# Patient Record
Sex: FEMALE | Race: WHITE | NOT HISPANIC OR LATINO | Employment: FULL TIME | URBAN - NONMETROPOLITAN AREA
[De-identification: names, ages, dates, MRNs, and addresses within clinical notes are randomized per-mention and may not be internally consistent; named-entity substitution may affect disease eponyms.]

---

## 2017-01-25 ENCOUNTER — HISTORY (OUTPATIENT)
Dept: FAMILY MEDICINE | Facility: OTHER | Age: 47
End: 2017-01-25

## 2017-01-25 ENCOUNTER — OFFICE VISIT - GICH (OUTPATIENT)
Dept: FAMILY MEDICINE | Facility: OTHER | Age: 47
End: 2017-01-25

## 2017-01-25 DIAGNOSIS — L28.0 LICHEN SIMPLEX CHRONICUS: ICD-10-CM

## 2017-01-25 DIAGNOSIS — N91.2 AMENORRHEA: ICD-10-CM

## 2017-01-25 DIAGNOSIS — E04.1 NONTOXIC SINGLE THYROID NODULE: ICD-10-CM

## 2017-01-25 DIAGNOSIS — Z13.220 ENCOUNTER FOR SCREENING FOR LIPOID DISORDERS: ICD-10-CM

## 2017-01-25 DIAGNOSIS — F33.0 MAJOR DEPRESSIVE DISORDER, RECURRENT, MILD (H): ICD-10-CM

## 2017-01-25 DIAGNOSIS — Z23 ENCOUNTER FOR IMMUNIZATION: ICD-10-CM

## 2017-01-25 DIAGNOSIS — Z13.1 ENCOUNTER FOR SCREENING FOR DIABETES MELLITUS: ICD-10-CM

## 2017-01-25 DIAGNOSIS — Z00.00 ENCOUNTER FOR GENERAL ADULT MEDICAL EXAMINATION WITHOUT ABNORMAL FINDINGS: ICD-10-CM

## 2017-01-25 LAB
ANION GAP - HISTORICAL: 6 (ref 5–18)
BUN SERPL-MCNC: 14 MG/DL (ref 7–25)
BUN/CREAT RATIO - HISTORICAL: 16
CALCIUM SERPL-MCNC: 9.7 MG/DL (ref 8.6–10.3)
CHLORIDE SERPLBLD-SCNC: 103 MMOL/L (ref 98–107)
CHOL/HDL RATIO - HISTORICAL: 2.95
CHOLESTEROL TOTAL: 215 MG/DL
CO2 SERPL-SCNC: 28 MMOL/L (ref 21–31)
CREAT SERPL-MCNC: 0.89 MG/DL (ref 0.7–1.3)
ESTRADIOL SERPL-MCNC: 26 PG/ML
GFR IF NOT AFRICAN AMERICAN - HISTORICAL: >60 ML/MIN/1.73M2
GLUCOSE SERPL-MCNC: 80 MG/DL (ref 70–105)
HDLC SERPL-MCNC: 73 MG/DL (ref 23–92)
LDLC SERPL CALC-MCNC: 133 MG/DL
NON-HDL CHOLESTEROL - HISTORICAL: 142 MG/DL
PATIENT STATUS - HISTORICAL: ABNORMAL
POTASSIUM SERPL-SCNC: 4 MMOL/L (ref 3.5–5.1)
SODIUM SERPL-SCNC: 137 MMOL/L (ref 133–143)
TRIGL SERPL-MCNC: 44 MG/DL
TSH - HISTORICAL: 1.06 UIU/ML (ref 0.34–5.6)

## 2017-01-25 ASSESSMENT — PATIENT HEALTH QUESTIONNAIRE - PHQ9: SUM OF ALL RESPONSES TO PHQ QUESTIONS 1-9: 5

## 2017-01-25 ASSESSMENT — ANXIETY QUESTIONNAIRES
3. WORRYING TOO MUCH ABOUT DIFFERENT THINGS: SEVERAL DAYS
6. BECOMING EASILY ANNOYED OR IRRITABLE: NOT AT ALL
5. BEING SO RESTLESS THAT IT IS HARD TO SIT STILL: NOT AT ALL
7. FEELING AFRAID AS IF SOMETHING AWFUL MIGHT HAPPEN: NOT AT ALL
GAD7 TOTAL SCORE: 2
1. FEELING NERVOUS, ANXIOUS, OR ON EDGE: NOT AT ALL
4. TROUBLE RELAXING: NOT AT ALL
2. NOT BEING ABLE TO STOP OR CONTROL WORRYING: SEVERAL DAYS

## 2017-01-26 ENCOUNTER — HISTORY (OUTPATIENT)
Dept: FAMILY MEDICINE | Facility: OTHER | Age: 47
End: 2017-01-26

## 2017-02-03 ENCOUNTER — COMMUNICATION - GICH (OUTPATIENT)
Dept: FAMILY MEDICINE | Facility: OTHER | Age: 47
End: 2017-02-03

## 2017-03-22 ENCOUNTER — HISTORY (OUTPATIENT)
Dept: FAMILY MEDICINE | Facility: OTHER | Age: 47
End: 2017-03-22

## 2017-03-22 ENCOUNTER — OFFICE VISIT - GICH (OUTPATIENT)
Dept: FAMILY MEDICINE | Facility: OTHER | Age: 47
End: 2017-03-22

## 2017-03-22 DIAGNOSIS — R68.89 OTHER GENERAL SYMPTOMS AND SIGNS: ICD-10-CM

## 2017-03-22 DIAGNOSIS — R69 ILLNESS: ICD-10-CM

## 2017-12-13 ENCOUNTER — HISTORY (OUTPATIENT)
Dept: RADIOLOGY | Facility: OTHER | Age: 47
End: 2017-12-13

## 2017-12-13 ENCOUNTER — HOSPITAL ENCOUNTER (OUTPATIENT)
Dept: RADIOLOGY | Facility: OTHER | Age: 47
End: 2017-12-13
Attending: FAMILY MEDICINE

## 2017-12-13 DIAGNOSIS — Z12.31 ENCOUNTER FOR SCREENING MAMMOGRAM FOR MALIGNANT NEOPLASM OF BREAST: ICD-10-CM

## 2017-12-21 ENCOUNTER — COMMUNICATION - GICH (OUTPATIENT)
Dept: FAMILY MEDICINE | Facility: OTHER | Age: 47
End: 2017-12-21

## 2018-01-03 NOTE — PROGRESS NOTES
Patient Information     Patient Name MRN Sex     Johanne Fish [id number] Female 1970      Progress Notes by Zuly Dyer DO at 2017  2:45 PM     Author:  Zuly Dyer DO Service:  (none) Author Type:  PHYS- Osteopathic     Filed:  2017  7:35 AM Encounter Date:  2017 Status:  Signed     :  Zuly Dyer DO (PHYS- Osteopathic)            ANNUAL PHYSICAL - FEMALE    HPI: Johanne Fish is a 46 y.o. female who presents for a yearly exam.  Concerns include:   1. Refill on her clobetasol.  Using for lichen sclerosis/atrophy.  Helpful, but rarely uses.  2x/year when she feels increase in itching/dryness.  Had endometrial ablation in , and had some spotting/periods afterward, nothing since 2014.  Hot flashes have resolved.  2. Refill on her prozac.  Mostly using during the winter months.  Will end up going off every summer and feeling well.  3. Thyroid cyst.  Had imaged a couple years ago.  No changes from previous.  But was recommended to see in one more year.    Patient's last menstrual period was 2014.   Contraception: postmenopausal, she assumes.  Risk for STI?: No  Last pap: 2014 - NEG with Neg HPV.  Prior abnormal with LOOP, but at least three normals since procedure.  Due2019.  Any hx of abnormal paps:  As above  FH of early CA?: sister with ovarian CA at age 50.  Cholesterol/DM concerns/screening: due today  Tobacco?: former smoker  Calcium intake: No  DEXA: NA  Last mammo: 2016  Colonoscopy: Due at age 48  Immunizations: Tdap due, had flu vaccine.  Shingles due at age 48.    Patient Active Problem List       Diagnosis  Date Noted     Thyroid cyst  2014     Anxiety, generalized  2012     Major depressive disorder, recurrent episode, mild (HC)       LICHEN SCLEROSIS ET ATROPHICUS       CERVICAL DYSPLASIA       DVT  2010     While on OCP's          PREMATURE VENTRICULAR CONTRACTIONS, FREQUENT       short CO  interval, cardiac workup negative.  Observation recommended by Dr Fletcher.            Past Medical History      Diagnosis   Date     Cervical dysplasia  2001     abnormal Paps, treated by loop excision.       DVT  1/23/2010     Lichen sclerosus et atrophicus  2005     external genital biopsy      Low back pain       Intermittent      PREMATURE VENTRICULAR CONTRACTIONS, FREQUENT         Past Surgical History       Procedure   Laterality Date     Tonsil and adenoidectomy        Breast biopsy  Right 1/1997     benign disease.        Leep procedure   9/2001     Loop       Stereotactic r breast bx   2012     Benign  Stereotactic R breast Bx [Other][       Endometrial ablation   07/20/2010       Social History     Social History        Marital status:  Single     Spouse name: N/A     Number of children:  N/A     Years of education:  N/A     Occupational History      Not on file.     Social History Main Topics          Smoking status:   Former Smoker      Packs/day:  1.00      Years:  10.00      Types:  Cigarettes      Quit date:  1/1/2006      Smokeless tobacco:   Never Used      Alcohol use   1.5 oz/week     3 Cans of beer per week        Comment: occasionally, weekends       Drug use:   No      Sexual activity:   Yes      Partners:  Male      Other Topics  Concern     Not on file      Social History Narrative     Patient worked at [employer] as ; now working in the school district.  .  Lives in her own house with her younger child. Older child grown.        Tahir  Child - 09/14/91    Sean  Child - 05/05/94       Family History       Problem   Relation Age of Onset     Heart Disease  Father      Congestive heart failure       Good Health  Mother      Other  Other      12 half-siblings, no history of chronic illnesses.       Cancer-ovarian  Sister 50     ovarian cancer       Other  Sister       hysterectomy for bleeding.       Cancer-breast  Maternal Grandmother        Current Outpatient  "Prescriptions       Medication  Sig Dispense Refill     clobetasol cream 0.05% (TEMOVATE) 0.05 % cream Apply to affected area twice daily for 2 weeks only 15 g 2     FLUoxetine (PROZAC) 20 mg capsule Take 1 capsule by mouth every morning. 90 capsule 4     No current facility-administered medications for this visit.      Medications have been reviewed by me and are current to the best of my knowledge and ability.       REVIEW OF SYSTEMS:  Refer to HPI; all other systems reviewed and negative.    PHYSICAL EXAM:  Visit Vitals       /88     Pulse 72     Ht 1.651 m (5' 5\")     Wt 70 kg (154 lb 6.4 oz)     LMP 2014     BMI 25.69 kg/m2     CONSTITUTIONAL:  Alert, cooperative, NAD.  EYES: No scleral icterus.  PERRLA.  Conjunctiva clear.  ENT/MOUTH: External ears and nose normal.  TMs normal.  Moist mucous membranes. Oropharynx clear.    ENDO: No thyromegaly or thyroid nodules.  LYMPH:  No cervical or supraclavicular LA.    BREASTS: declined  CARDIOVASCULAR: Regular, S1, S2.  No S3 or S4.  No murmur/gallop/rub.  No peripheral edema.  RESPIRATORY: CTA bilaterally, no wheezes, rhonchi or rales.  GI: Bowel sounds wnl.  Soft, nontender, nondistended.  No masses or HSM.  No rebound or guarding.  : declined today as pap smear was not due.  MSKEL: Grossly normal ROM.  No clubbing.  INTEGUMENTARY:  Warm, dry.  No rash noted on exposed skin.  NEUROLOGIC: Facies symmetric.  Grossly normal movement and tone.  No tremor.  PSYCHIATRIC: Affect normal.  Speech fluent.      PHQ Depression Screen  Over the last 2 weeks, how often have you been bothered by any of the following problems?  1. Little interest or pleasure in doing things: 1 - Several days  2. Feeling down, depressed, or hopeless: 1 - Several days  3. Trouble falling or staying asleep, or sleeping too much: 2 - More than half the days  4. Feeling tired or having little energy: 1 - Several days  5. Poor appetite or overeatin - Not at all  6. Feeling bad about " yourself - or that you are a failure or have let yourself or your family down: 0 - Not at all  7. Trouble concentrating on things, such as reading the newspaper or watching television: 0 - Not at all  8. Moving or speaking so slowly that other people could have noticed. Or the opposite - being so fidgety or restless that you have been moving around a lot more than usual: 0 - Not at all  9. Thoughts that you would be better off dead, or of hurting yourself in some way: 0 - Not at all  PHQ-9 TOTAL SCORE: 5  Depression Severity Level: mild       Results for orders placed or performed in visit on 01/25/17      LIPID PANEL      Result  Value Ref Range    CHOLESTEROL,TOTAL 215 (H) <200 mg/dL    TRIGLYCERIDES 44 <150 mg/dL    HDL CHOLESTEROL 73 23 - 92 mg/dL    NON-HDL CHOLESTEROL 142 <145 mg/dl    CHOL/HDL RATIO            2.95 <4.50                    LDL CHOLESTEROL 133 (H) <100 mg/dL    PATIENT STATUS            FASTING                   TSH      Result  Value Ref Range    TSH 1.06 0.34 - 5.60 uIU/mL   BASIC METABOLIC PANEL      Result  Value Ref Range    SODIUM 137 133 - 143 mmol/L    POTASSIUM 4.0 3.5 - 5.1 mmol/L    CHLORIDE 103 98 - 107 mmol/L    CO2,TOTAL 28 21 - 31 mmol/L    ANION GAP 6 5 - 18                    GLUCOSE 80 70 - 105 mg/dL    CALCIUM 9.7 8.6 - 10.3 mg/dL    BUN 14 7 - 25 mg/dL    CREATININE 0.89 0.70 - 1.30 mg/dL    BUN/CREAT RATIO           16                    GFR if African American >60 >60 ml/min/1.73m2    GFR if not African American >60 >60 ml/min/1.73m2   ESTRADIOL      Result  Value Ref Range    ESTRADIOL GIH 26 pg/mL     ASSESSMENT/PLAN:    ICD-10-CM    1. Annual physical exam Z00.00    2. Major depressive disorder, recurrent episode, mild (HC) F33.0 FLUoxetine (PROZAC) 20 mg capsule   3. LICHEN SCLEROSIS ET ATROPHICUS L28.0 clobetasol cream 0.05% (TEMOVATE) 0.05 % cream   4. Need for Tdap vaccination Z23 TDAP VACCINE IM      CT ADMIN VACC INITIAL   5. Lipid screening Z13.220 LIPID PANEL       LIPID PANEL   6. Thyroid cyst E04.1 TSH      TSH      US THYROID/PARATHYROID   7. Diabetes mellitus screening Z13.1 BASIC METABOLIC PANEL      BASIC METABOLIC PANEL   8. Amenorrhea N91.2 FSH      LUTEINIZING HORMONE      ESTRADIOL      ESTRADIOL     Relevant cancer screening discussed.    Counseled on healthy diet, Calcium and vitamin D intake, and exercise.  Tdap given today.  Screening labs to include: BMP, lipid panel.    MDD, recurrent: may be more SAD.  Refilled Prozac at prior 20mg daily.  Encouraged at least 6+ weeks of use per season to have benefit.  Lichen sclerosis, chronic, mild: refilled clobetasol cream today due to  medication.    Amenorrhea with likely postmenopausal state: will obtain FSH, LH and estradiol levels.    Thyroid cyst, likely benign: follow up US to be scheduled.      JUAN ROBERSON, DO

## 2018-01-04 NOTE — PROGRESS NOTES
Patient Information     Patient Name MRN Sex Johanne Gonzales [id number] Female 1970      Progress Notes by Sara Buitrago NP at 3/22/2017  4:45 PM     Author:  Sara Buitrago NP Service:  (none) Author Type:  PHYS- Nurse Practitioner     Filed:  3/22/2017  5:36 PM Encounter Date:  3/22/2017 Status:  Signed     :  Sara Buitrago NP (PHYS- Nurse Practitioner)            HPI:    Johanne Fish is a 46 y.o. female who presents to clinic today for fever, flu.  Symptoms started less than 48 hours ago.  Fevers around 100-101, chills, headaches, body aches, sore throat, cough, runny nose, fatigue.  Dry cough.  No chest tightness or shortness of breath.  Normal appetite.  Pushing fluids.  Taking Ibuprofen and sinus medication.  Had flu shot.            Past Medical History      Diagnosis   Date     Cervical dysplasia       abnormal Paps, treated by loop excision.       DVT  2010     Lichen sclerosus et atrophicus       external genital biopsy      Low back pain       Intermittent      PREMATURE VENTRICULAR CONTRACTIONS, FREQUENT       Past Surgical History       Procedure   Laterality Date     Tonsil and adenoidectomy        Breast biopsy  Right 1997     benign disease.        Leep procedure   2001     Loop       Stereotactic r breast bx        Benign  Stereotactic R breast Bx [Other][       Endometrial ablation   2010     Social History        Substance Use Topics          Smoking status:   Former Smoker      Packs/day:  1.00      Years:  10.00      Types:  Cigarettes      Quit date:  2006      Smokeless tobacco:   Never Used      Alcohol use   1.5 oz/week     3 Cans of beer per week        Comment: occasionally, weekends       Current Outpatient Prescriptions       Medication  Sig Dispense Refill     clobetasol cream 0.05% (TEMOVATE) 0.05 % cream Apply to affected area twice daily for 2 weeks only 15 g 2     FLUoxetine (PROZAC) 20 mg capsule Take 1  "capsule by mouth every morning. 90 capsule 4     No current facility-administered medications for this visit.      Medications have been reviewed by me and are current to the best of my knowledge and ability.    No Known Allergies    ROS:  Refer to HPI    Visit Vitals       /70     Pulse 88     Temp 100  F (37.8  C) (Tympanic)     Ht 1.638 m (5' 4.5\")     Wt 71.8 kg (158 lb 6 oz)     LMP 11/20/2014     BMI 26.77 kg/m2       EXAM:  General Appearance: Well appearing adult female, appropriate appearance for age. No acute distress  Head: normocephalic, atraumatic  Ears: Left TM with bony landmarks appreciated, no erythema, no effusion, no bulging, no purulence.  Right TM with bony landmarks appreciated, no erythema, no effusion, no bulging, no purulence.   Left auditory canal clear.  Right auditory canal clear.  Normal external ears, non tender.  Eyes: conjunctivae normal, no drainage  Orophayrnx: moist mucous membranes, posterior pharynx without erythema, tonsils without hypertrophy, no erythema, no exudates or petechiae, no post nasal drip seen.    Neck: supple without adenopathy  Respiratory: normal chest wall and respirations.  Normal effort.  Clear to auscultation bilaterally, no wheezes or rhonchi or congestion, no cough appreciated  Cardiac: RRR with no murmurs  Psychological: normal affect, alert and pleasant        ASSESSMENT/PLAN:    ICD-10-CM    1. Influenza-like symptoms R68.89 oseltamivir (TAMIFLU) 75 mg capsule   2. Influenza-like illness R69 oseltamivir (TAMIFLU) 75 mg capsule         Symptoms consistent with influenza  Tamiflu 75 mg BID x 5 days   Encouraged fluids  Symptomatic treatment - salt water gargles, honey, elevation, humidifier, sinus rinse/netti pot, lozenges, etc   Tylenol or ibuprofen PRN  Follow up if symptoms persist or worsen or concerns        Patient Instructions   Tamiflu twice daily x 5 days    Symptoms likely due to virus. No antibiotic is needed at this time.       Most " coughs are caused by a viral infection.   Usually coughs can last 2 to 3 weeks. Sometimes the cough becomes loose (wet) for a few days, and your child coughs up a lot of phlegm (mucus). This is usually a sign that the end of the illness is near.    Most sore throats are caused by viruses and are part of a cold. About 10% of sore throats are caused by strep bacteria.    Encouraged fluids and rest.    May use symptomatic care with tylenol or ibuprofen.     Using a humidifier works well to break up the congestion.     Elevate the mattress to 15 degrees in order to help with the congestion.    Frequent swallows of cool liquid.      Oatmeal or honey coats the throat and some patients find it soothes the pain.     Salt water gargles as needed    Return to clinic with change/worsening of symptoms or concerns.       Index French All languages Related topics   Flu (Influenza)   ________________________________________________________________________  KEY POINTS    Flu is caused by a virus, and can be spread by coughing or sneezing, or by touching something with the virus on it.    The flu vaccine is the best way to help prevent the flu. Washing your hands often is also important. Flu can be treated rest, drinking plenty of liquids, and sometimes with medicine.    If you have another medical problem and get the flu, it s best to see your healthcare provider.  ________________________________________________________________________  What is flu?   Influenza, also called the flu, is an infection caused by a virus. The flu affects your whole body, especially your air passages, and causes symptoms that are similar to cold symptoms. Flu symptoms tend to be worse than cold symptoms, but it can sometimes be hard to tell the difference between the flu and a cold unless you get a test.  Infection with the flu virus sometimes leads to other infections, such as ear, sinus, and chest infections. Pneumonia can also occur as a result of  the flu. It can be caused by the flu virus itself or by bacteria infecting lung tissues that have been damaged by the virus. Older adults; people whose immune systems are weak; and people with chronic medical problems, such as heart or lung disease or diabetes, are at risk for more severe symptoms or problems. This is why it s important to try to prevent flu by getting flu shots every year.  What is the cause?  Flu is caused by a virus. When you have the flu, the virus is in your mucus and saliva and can spread to others when you cough or sneeze. People can also get the flu if they touch something with the flu virus on it (like cups, doorknobs, and hands) and then touch their mouth, nose, or eyes.  Outbreaks of flu occur every year, usually in late fall and winter.  What are the symptoms?  Flu tends to start suddenly. You may feel fine one hour and feel sick the next. Flu symptoms may be different from person to person. Some of the common symptoms include:    Chills, sweating, and fever    Cough    Runny or stuffy nose    Headache    Muscle or body aches    Sore throat    Tiredness  Flu symptoms usually last 3 to 7 days. You may start feeling better after the first 2 days or so.  How is it diagnosed?  Your healthcare provider will ask about your symptoms and may examine you. The diagnosis is usually based on your symptoms. There are lab tests for flu, but in most cases there is no need to do a test, especially when many others in your community are sick with the flu.  How is it treated?  Usually you can treat your symptoms at home.    Get plenty of rest.    Drink a lot of clear liquids. Water, broth, juice, electrolyte solutions, and noncaffeinated drinks are best. When you have a high fever, your body needs more liquid because you lose more water in your breath and from your skin. Having enough fluids also helps the mucus in your sinuses and lungs stay thin and easy to clear from the body. When the mucus is thin,  it is less likely to cause a sinus or chest infection.    Consider taking acetaminophen or ibuprofen to relieve headaches and muscle aches and to lower a fever. Read the label and take as directed. Unless recommended by your healthcare provider, you should not take these medicines for more than 10 days.    Nonsteroidal anti-inflammatory medicines (NSAIDs), such as ibuprofen, naproxen, and aspirin, may cause stomach bleeding and other problems. These risks increase with age.    Acetaminophen may cause liver damage or other problems. Unless recommended by your provider, don't take more than 3000 milligrams (mg) in 24 hours. To make sure you don t take too much, check other medicines you take to see if they also contain acetaminophen. Ask your provider if you need to avoid drinking alcohol while taking this medicine.    If your nose or sinuses get congested, a decongestant medicine may help you feel better. Taking a decongestant may help prevent ear or sinus infections.    Cough medicine or cough drops may temporarily help control a cough.  Antiviral medicine is medicine your healthcare provider can prescribe that may make flu symptoms less severe. It may also help you feel better a little sooner. The medicine can be taken as a tablet or nasal spray. It helps only if you start taking it within the first 2 days of illness. Usually it is taken for only a few days. Even if you are taking antiviral medicine, you can pass the flu virus to other people. It is still important to wash your hands often and cover your mouth and nose when you cough or sneeze.  Your healthcare provider may prescribe antiviral medicine if you aren t sick yet but have been exposed to the flu and have not had the flu vaccine.  Talk to your healthcare provider if you have symptoms of the flu and:    You have heart disease, asthma, chronic bronchitis, kidney disease, diabetes, or another chronic medical problem.    Your immune system does not work  normally (for example, because you are taking steroid medicine for a medical problem).    Your symptoms get more severe, you have a painful cough, you are coughing up mucus, or you are having trouble breathing. These symptoms can be signs of pneumonia.  Ask your healthcare provider:    How and when you will get your test results    How long it will take to recover from this illness    If there are activities you should avoid, and when you can return to your normal activities    How to take care of yourself at home    What symptoms or problems you should watch for and what to do if you have them  Make sure you know when you should come back for a checkup. Keep all appointments for provider visits or tests.  How can I help prevent flu?  The flu vaccine is the best way to help prevent the flu. If you do get the flu, the vaccine may help keep you from getting really sick. The flu vaccine is recommended for adults and children 6 months and older. It s especially important for those with a chronic illness.  The flu vaccine can be given as a nasal spray or as a shot in the arm. The nasal spray is not recommended for the 8917-7686 flu season because it has not prevented the disease for the last 3 years.    The shot contains killed virus and is safe for everyone age 6 months and older.  You should get a new flu shot every year because the vaccine wears off over time and because it is changed each year to protect against the current year s most likely flu strains. It s best to get the new vaccine as soon as it s available each year, before the start of flu season. However, if the vaccine is still available, it can be helpful to get it anytime during the flu season. Flu season usually starts in October and can last through May.  Flu seasons can vary from region to region. If you are at high risk for infection and plan to travel to an area where you might be exposed to the flu, make sure you have an up-to-date flu shot before  you go on your trip.  Other things you can do to help avoid getting the flu are:    Wash your hands often with soap and water. Wash for 20 seconds (long enough to sing the whole  Happy Birthday  song) or use an alcohol-based hand .    Avoid touching your eyes, nose, or mouth when you are out in public.    Stay at least 6 feet away from people who are sick, if you can.    Try to take good care of yourself: Get plenty of sleep, be physically active, manage your stress, drink plenty of fluids, and eat healthy food. Stop smoking.    Keep surfaces clean--especially bedside tables, surfaces in the bathroom, and toys for children. Some viruses and bacteria can live 2 hours or more on surfaces like cafeteria tables, doorknobs, and desks. Wipe them down with a household disinfectant according to directions on the label.  If you are sick, you can help protect others if you:    Don t go to work or school. Avoid contact with other people except to get medical care.    Cover your nose and mouth with a tissue when you cough or sneeze. Throw the tissue in the trash after you use it, and then wash your hands. If you don t have a tissue, cough or sneeze into your upper sleeve instead of your hands.    Clean your hands often with soap and water or an alcohol-based hand , especially after using tissues or coughing or sneezing into your hands.  Developed by [employer].  AdultAdvisor 2016.3 published by [employer].  Last modified: 2016-06-29  Last reviewed: 2014-10-30  This content is reviewed periodically and is subject to change as new health information becomes available. The information is intended to inform and educate and is not a replacement for medical evaluation, advice, diagnosis or treatment by a healthcare professional.  References   Adult Advisor 2016.3 Index    Copyright   2016 [employer], a division of [employer] All rights reserved.

## 2018-01-04 NOTE — PATIENT INSTRUCTIONS
Patient Information     Patient Name MRN Sex Johanne Gonzales [id number] Female 1970      Patient Instructions by Sara Buitrago NP at 3/22/2017  4:56 PM     Author:  Sara Buitrago NP  Service:  (none) Author Type:  PHYS- Nurse Practitioner     Filed:  3/22/2017  4:56 PM  Encounter Date:  3/22/2017 Status:  Addendum     :  Sara Buitrago NP (PHYS- Nurse Practitioner)        Related Notes: Original Note by Sara Buitrago NP (PHYS- Nurse Practitioner) filed at 3/22/2017  4:56 PM            Tamiflu twice daily x 5 days    Symptoms likely due to virus. No antibiotic is needed at this time.       Most coughs are caused by a viral infection.   Usually coughs can last 2 to 3 weeks. Sometimes the cough becomes loose (wet) for a few days, and your child coughs up a lot of phlegm (mucus). This is usually a sign that the end of the illness is near.    Most sore throats are caused by viruses and are part of a cold. About 10% of sore throats are caused by strep bacteria.    Encouraged fluids and rest.    May use symptomatic care with tylenol or ibuprofen.     Using a humidifier works well to break up the congestion.     Elevate the mattress to 15 degrees in order to help with the congestion.    Frequent swallows of cool liquid.      Oatmeal or honey coats the throat and some patients find it soothes the pain.     Salt water gargles as needed    Return to clinic with change/worsening of symptoms or concerns.       Index Guamanian All languages Related topics   Flu (Influenza)   ________________________________________________________________________  KEY POINTS    Flu is caused by a virus, and can be spread by coughing or sneezing, or by touching something with the virus on it.    The flu vaccine is the best way to help prevent the flu. Washing your hands often is also important. Flu can be treated rest, drinking plenty of liquids, and sometimes with medicine.    If you have another medical  problem and get the flu, it s best to see your healthcare provider.  ________________________________________________________________________  What is flu?   Influenza, also called the flu, is an infection caused by a virus. The flu affects your whole body, especially your air passages, and causes symptoms that are similar to cold symptoms. Flu symptoms tend to be worse than cold symptoms, but it can sometimes be hard to tell the difference between the flu and a cold unless you get a test.  Infection with the flu virus sometimes leads to other infections, such as ear, sinus, and chest infections. Pneumonia can also occur as a result of the flu. It can be caused by the flu virus itself or by bacteria infecting lung tissues that have been damaged by the virus. Older adults; people whose immune systems are weak; and people with chronic medical problems, such as heart or lung disease or diabetes, are at risk for more severe symptoms or problems. This is why it s important to try to prevent flu by getting flu shots every year.  What is the cause?  Flu is caused by a virus. When you have the flu, the virus is in your mucus and saliva and can spread to others when you cough or sneeze. People can also get the flu if they touch something with the flu virus on it (like cups, doorknobs, and hands) and then touch their mouth, nose, or eyes.  Outbreaks of flu occur every year, usually in late fall and winter.  What are the symptoms?  Flu tends to start suddenly. You may feel fine one hour and feel sick the next. Flu symptoms may be different from person to person. Some of the common symptoms include:    Chills, sweating, and fever    Cough    Runny or stuffy nose    Headache    Muscle or body aches    Sore throat    Tiredness  Flu symptoms usually last 3 to 7 days. You may start feeling better after the first 2 days or so.  How is it diagnosed?  Your healthcare provider will ask about your symptoms and may examine you. The  diagnosis is usually based on your symptoms. There are lab tests for flu, but in most cases there is no need to do a test, especially when many others in your community are sick with the flu.  How is it treated?  Usually you can treat your symptoms at home.    Get plenty of rest.    Drink a lot of clear liquids. Water, broth, juice, electrolyte solutions, and noncaffeinated drinks are best. When you have a high fever, your body needs more liquid because you lose more water in your breath and from your skin. Having enough fluids also helps the mucus in your sinuses and lungs stay thin and easy to clear from the body. When the mucus is thin, it is less likely to cause a sinus or chest infection.    Consider taking acetaminophen or ibuprofen to relieve headaches and muscle aches and to lower a fever. Read the label and take as directed. Unless recommended by your healthcare provider, you should not take these medicines for more than 10 days.    Nonsteroidal anti-inflammatory medicines (NSAIDs), such as ibuprofen, naproxen, and aspirin, may cause stomach bleeding and other problems. These risks increase with age.    Acetaminophen may cause liver damage or other problems. Unless recommended by your provider, don't take more than 3000 milligrams (mg) in 24 hours. To make sure you don t take too much, check other medicines you take to see if they also contain acetaminophen. Ask your provider if you need to avoid drinking alcohol while taking this medicine.    If your nose or sinuses get congested, a decongestant medicine may help you feel better. Taking a decongestant may help prevent ear or sinus infections.    Cough medicine or cough drops may temporarily help control a cough.  Antiviral medicine is medicine your healthcare provider can prescribe that may make flu symptoms less severe. It may also help you feel better a little sooner. The medicine can be taken as a tablet or nasal spray. It helps only if you start taking  it within the first 2 days of illness. Usually it is taken for only a few days. Even if you are taking antiviral medicine, you can pass the flu virus to other people. It is still important to wash your hands often and cover your mouth and nose when you cough or sneeze.  Your healthcare provider may prescribe antiviral medicine if you aren t sick yet but have been exposed to the flu and have not had the flu vaccine.  Talk to your healthcare provider if you have symptoms of the flu and:    You have heart disease, asthma, chronic bronchitis, kidney disease, diabetes, or another chronic medical problem.    Your immune system does not work normally (for example, because you are taking steroid medicine for a medical problem).    Your symptoms get more severe, you have a painful cough, you are coughing up mucus, or you are having trouble breathing. These symptoms can be signs of pneumonia.  Ask your healthcare provider:    How and when you will get your test results    How long it will take to recover from this illness    If there are activities you should avoid, and when you can return to your normal activities    How to take care of yourself at home    What symptoms or problems you should watch for and what to do if you have them  Make sure you know when you should come back for a checkup. Keep all appointments for provider visits or tests.  How can I help prevent flu?  The flu vaccine is the best way to help prevent the flu. If you do get the flu, the vaccine may help keep you from getting really sick. The flu vaccine is recommended for adults and children 6 months and older. It s especially important for those with a chronic illness.  The flu vaccine can be given as a nasal spray or as a shot in the arm. The nasal spray is not recommended for the 9922-4044 flu season because it has not prevented the disease for the last 3 years.    The shot contains killed virus and is safe for everyone age 6 months and older.  You  should get a new flu shot every year because the vaccine wears off over time and because it is changed each year to protect against the current year s most likely flu strains. It s best to get the new vaccine as soon as it s available each year, before the start of flu season. However, if the vaccine is still available, it can be helpful to get it anytime during the flu season. Flu season usually starts in October and can last through May.  Flu seasons can vary from region to region. If you are at high risk for infection and plan to travel to an area where you might be exposed to the flu, make sure you have an up-to-date flu shot before you go on your trip.  Other things you can do to help avoid getting the flu are:    Wash your hands often with soap and water. Wash for 20 seconds (long enough to sing the whole  Happy Birthday  song) or use an alcohol-based hand .    Avoid touching your eyes, nose, or mouth when you are out in public.    Stay at least 6 feet away from people who are sick, if you can.    Try to take good care of yourself: Get plenty of sleep, be physically active, manage your stress, drink plenty of fluids, and eat healthy food. Stop smoking.    Keep surfaces clean--especially bedside tables, surfaces in the bathroom, and toys for children. Some viruses and bacteria can live 2 hours or more on surfaces like cafeteria tables, doorknobs, and desks. Wipe them down with a household disinfectant according to directions on the label.  If you are sick, you can help protect others if you:    Don t go to work or school. Avoid contact with other people except to get medical care.    Cover your nose and mouth with a tissue when you cough or sneeze. Throw the tissue in the trash after you use it, and then wash your hands. If you don t have a tissue, cough or sneeze into your upper sleeve instead of your hands.    Clean your hands often with soap and water or an alcohol-based hand , especially after  using tissues or coughing or sneezing into your hands.  Developed by [employer].  Adult Advisor 2016.3 published by [employer].  Last modified: 2016-06-29  Last reviewed: 2014-10-30  This content is reviewed periodically and is subject to change as new health information becomes available. The information is intended to inform and educate and is not a replacement for medical evaluation, advice, diagnosis or treatment by a healthcare professional.  References   Adult Advisor 2016.3 Index    Copyright   2016 [employer], a division of [employer] All rights reserved.

## 2018-01-04 NOTE — NURSING NOTE
Patient Information     Patient Name MRN Johanne Stanton [id number] Female 1970      Nursing Note by Gosselin, Norma J at 3/22/2017  4:45 PM     Author:  Gosselin, Norma J Service:  (none) Author Type:  (none)     Filed:  3/22/2017  4:52 PM Encounter Date:  3/22/2017 Status:  Signed     :  Gosselin, Norma J            Patient presents to clinic with fever, headache, bodyaches.  Norma J Gosselin LPN....................  3/22/2017   4:35 PM

## 2018-01-08 ENCOUNTER — OFFICE VISIT - GICH (OUTPATIENT)
Dept: FAMILY MEDICINE | Facility: OTHER | Age: 48
End: 2018-01-08

## 2018-01-08 ENCOUNTER — HISTORY (OUTPATIENT)
Dept: FAMILY MEDICINE | Facility: OTHER | Age: 48
End: 2018-01-08

## 2018-01-08 DIAGNOSIS — R42 DIZZINESS AND GIDDINESS: ICD-10-CM

## 2018-01-08 DIAGNOSIS — R11.0 NAUSEA: ICD-10-CM

## 2018-01-08 DIAGNOSIS — E04.1 NONTOXIC SINGLE THYROID NODULE: ICD-10-CM

## 2018-01-08 LAB
ABSOLUTE BASOPHILS - HISTORICAL: 0.1 THOU/CU MM
ABSOLUTE EOSINOPHILS - HISTORICAL: 0.2 THOU/CU MM
ABSOLUTE IMMATURE GRANULOCYTES(METAS,MYELOS,PROS) - HISTORICAL: 0 THOU/CU MM
ABSOLUTE LYMPHOCYTES - HISTORICAL: 2.8 THOU/CU MM (ref 0.9–2.9)
ABSOLUTE MONOCYTES - HISTORICAL: 0.6 THOU/CU MM
ABSOLUTE NEUTROPHILS - HISTORICAL: 4.5 THOU/CU MM (ref 1.7–7)
ANION GAP - HISTORICAL: 9 (ref 5–18)
BASOPHILS # BLD AUTO: 1.3 %
BILIRUB UR QL: NEGATIVE
BUN SERPL-MCNC: 18 MG/DL (ref 7–25)
BUN/CREAT RATIO - HISTORICAL: 20
C-REACTIVE PROTEIN - HISTORICAL: <1 MG/DL
CALCIUM SERPL-MCNC: 8.5 MG/DL (ref 8.6–10.3)
CHLORIDE SERPLBLD-SCNC: 102 MMOL/L (ref 98–107)
CLARITY, URINE: CLEAR CLARITY
CO2 SERPL-SCNC: 27 MMOL/L (ref 21–31)
COLOR UR: YELLOW COLOR
CREAT SERPL-MCNC: 0.88 MG/DL (ref 0.7–1.3)
EOSINOPHIL NFR BLD AUTO: 2.5 %
ERYTHROCYTE [DISTWIDTH] IN BLOOD BY AUTOMATED COUNT: 12.6 % (ref 11.5–15.5)
ERYTHROCYTE [SEDIMENTATION RATE] IN BLOOD: 3 MM/HR
GFR IF NOT AFRICAN AMERICAN - HISTORICAL: >60 ML/MIN/1.73M2
GLUCOSE SERPL-MCNC: 96 MG/DL (ref 70–105)
GLUCOSE URINE: NEGATIVE MG/DL
HCT VFR BLD AUTO: 42.2 % (ref 33–51)
HEMOGLOBIN: 14.4 G/DL (ref 12–16)
IMMATURE GRANULOCYTES(METAS,MYELOS,PROS) - HISTORICAL: 0.2 %
KETONES UR QL: NEGATIVE MG/DL
LEUKOCYTE ESTERASE URINE: NEGATIVE
LYMPHOCYTES NFR BLD AUTO: 34.3 % (ref 20–44)
MCH RBC QN AUTO: 30.8 PG (ref 26–34)
MCHC RBC AUTO-ENTMCNC: 34.1 G/DL (ref 32–36)
MCV RBC AUTO: 90 FL (ref 80–100)
MONOCYTES NFR BLD AUTO: 7.4 %
NEUTROPHILS NFR BLD AUTO: 54.3 % (ref 42–72)
NITRITE UR QL STRIP: NEGATIVE
OCCULT BLOOD,URINE - HISTORICAL: NEGATIVE
PH UR: 6 [PH]
PLATELET # BLD AUTO: 272 THOU/CU MM (ref 140–440)
PMV BLD: 10.1 FL (ref 6.5–11)
POTASSIUM SERPL-SCNC: 4.2 MMOL/L (ref 3.5–5.1)
PROTEIN QUALITATIVE,URINE - HISTORICAL: NEGATIVE MG/DL
RED BLOOD COUNT - HISTORICAL: 4.68 MIL/CU MM (ref 4–5.2)
SODIUM SERPL-SCNC: 138 MMOL/L (ref 133–143)
SP GR UR STRIP: <=1.005
TSH - HISTORICAL: 1.7 UIU/ML (ref 0.34–5.6)
UROBILINOGEN,QUALITATIVE - HISTORICAL: NORMAL EU/DL
WHITE BLOOD COUNT - HISTORICAL: 8.3 THOU/CU MM (ref 4.5–11)

## 2018-01-08 ASSESSMENT — PATIENT HEALTH QUESTIONNAIRE - PHQ9: SUM OF ALL RESPONSES TO PHQ QUESTIONS 1-9: 4

## 2018-01-18 PROBLEM — F33.0 MAJOR DEPRESSIVE DISORDER, RECURRENT EPISODE, MILD (H): Status: ACTIVE | Noted: 2018-01-18

## 2018-01-18 PROBLEM — L28.0 OTHER LICHEN, NOT ELSEWHERE CLASSIFIED: Status: ACTIVE | Noted: 2018-01-18

## 2018-01-18 PROBLEM — I49.49 OTHER PREMATURE BEATS: Status: ACTIVE | Noted: 2018-01-18

## 2018-01-18 PROBLEM — Z87.410 HISTORY OF CERVICAL DYSPLASIA: Status: ACTIVE | Noted: 2018-01-18

## 2018-01-18 RX ORDER — CLOBETASOL PROPIONATE 0.5 MG/G
CREAM TOPICAL
COMMUNITY
Start: 2017-01-25 | End: 2021-02-22

## 2018-01-27 VITALS
HEIGHT: 65 IN | HEART RATE: 88 BPM | DIASTOLIC BLOOD PRESSURE: 88 MMHG | HEIGHT: 65 IN | WEIGHT: 154.4 LBS | BODY MASS INDEX: 26.39 KG/M2 | SYSTOLIC BLOOD PRESSURE: 104 MMHG | HEART RATE: 72 BPM | DIASTOLIC BLOOD PRESSURE: 70 MMHG | SYSTOLIC BLOOD PRESSURE: 108 MMHG | TEMPERATURE: 100 F | WEIGHT: 158.38 LBS | BODY MASS INDEX: 25.72 KG/M2

## 2018-02-01 ENCOUNTER — OFFICE VISIT - GICH (OUTPATIENT)
Dept: FAMILY MEDICINE | Facility: OTHER | Age: 48
End: 2018-02-01

## 2018-02-01 ENCOUNTER — HISTORY (OUTPATIENT)
Dept: FAMILY MEDICINE | Facility: OTHER | Age: 48
End: 2018-02-01

## 2018-02-01 DIAGNOSIS — J02.9 ACUTE PHARYNGITIS: ICD-10-CM

## 2018-02-01 LAB — STREP A ANTIGEN - HISTORICAL: NEGATIVE

## 2018-02-01 ASSESSMENT — ANXIETY QUESTIONNAIRES
GAD7 TOTAL SCORE: 0
5. BEING SO RESTLESS THAT IT IS HARD TO SIT STILL: NOT AT ALL
3. WORRYING TOO MUCH ABOUT DIFFERENT THINGS: NOT AT ALL
1. FEELING NERVOUS, ANXIOUS, OR ON EDGE: NOT AT ALL
6. BECOMING EASILY ANNOYED OR IRRITABLE: NOT AT ALL
7. FEELING AFRAID AS IF SOMETHING AWFUL MIGHT HAPPEN: NOT AT ALL
2. NOT BEING ABLE TO STOP OR CONTROL WORRYING: NOT AT ALL
4. TROUBLE RELAXING: NOT AT ALL

## 2018-02-04 ASSESSMENT — ANXIETY QUESTIONNAIRES: GAD7 TOTAL SCORE: 2

## 2018-02-04 ASSESSMENT — PATIENT HEALTH QUESTIONNAIRE - PHQ9: SUM OF ALL RESPONSES TO PHQ QUESTIONS 1-9: 5

## 2018-02-09 VITALS
HEART RATE: 76 BPM | WEIGHT: 164 LBS | BODY MASS INDEX: 27.72 KG/M2 | TEMPERATURE: 98.5 F | DIASTOLIC BLOOD PRESSURE: 68 MMHG | SYSTOLIC BLOOD PRESSURE: 122 MMHG

## 2018-02-09 VITALS
DIASTOLIC BLOOD PRESSURE: 80 MMHG | HEART RATE: 64 BPM | WEIGHT: 162 LBS | BODY MASS INDEX: 26.99 KG/M2 | SYSTOLIC BLOOD PRESSURE: 112 MMHG | HEIGHT: 65 IN

## 2018-02-10 ASSESSMENT — ANXIETY QUESTIONNAIRES: GAD7 TOTAL SCORE: 0

## 2018-02-10 ASSESSMENT — PATIENT HEALTH QUESTIONNAIRE - PHQ9: SUM OF ALL RESPONSES TO PHQ QUESTIONS 1-9: 4

## 2018-02-12 NOTE — TELEPHONE ENCOUNTER
Patient Information     Patient Name MRN Johanne Stanton [id number] Female 1970      Telephone Encounter by Angie Bliss at 2017  9:26 AM     Author:  Angie Bliss Service:  (none) Author Type:  (none)     Filed:  2017  9:28 AM Encounter Date:  2017 Status:  Signed     :  Angie Bliss            Patient notified of normal Mammogram.   Angie Bliss LPN ..........2017 9:28 AM

## 2018-02-12 NOTE — PROGRESS NOTES
"Patient Information     Patient Name MRN Sex Johanne Gonzales [id number] Female 1970      Progress Notes by Johanne Welch PA-C at 2018  8:00 AM     Author:  Johanne Welch PA-C Service:  (none) Author Type:  PHYS- Physician Assistant     Filed:  1/10/2018  6:09 PM Encounter Date:  2018 Status:  Signed     :  Johanne Welch PA-C (PHYS- Physician Assistant)            Nursing Notes:   Mary Golden  2018  8:20 AM  Signed  Pt presents with headaches, and dizziness  x 2 weeks. Last episode was Saturday night.  Mary Golden      SUBJECTIVE:   Johanne Fish is a 47 y.o. female  who presents for evaluation of headaches, and dizziness  x 2 weeks. Last episode was Saturday night. Was standing at work for the first episode. Got dizzy. Went back to her desk. Then got nausea, cold sweats. Ate an orange. Ended up going home. Then vomited at home.  Got a headache. HA up and down. A few days ago thought she was going to pass out.  No LOC. Did not hit head. Did not go fully away in the last 2 weeks.  Taking it easier. HA did not go fully away.  Now just there.  Right temple.  No head trauma. No new meds, foods.  No fevers, . No stomach aches, problems breathing, SOB. No room spinning.  No diarrhea.  Constipation - been a little worse lately. No dysuria, freq. Urgency normal for her.  No neck pain, back pain, chest pain.  No ear pain. NO hx migraines. Shaky. Tx ibuprofen - helps.     Headache: yes - slightly  Nausea: yes, vomited once  Balance problems or dizziness: not currently  Double or fuzzy vision: no  Sensitivity to light or noise: no   Feeling sluggish: yes   Feeling \"foggy\" : no  Change in sleep pattern: no   Concentration or memory problems: no     Past Medical History:     Diagnosis  Date     Cervical dysplasia     abnormal Paps, treated by loop excision.       DVT 2010     Lichen sclerosus et atrophicus 2005    external genital biopsy      Low back pain     " "Intermittent      PREMATURE VENTRICULAR CONTRACTIONS, FREQUENT        Past Surgical History:      Procedure  Laterality Date     BREAST BIOPSY Right 1/1997    benign disease.        ENDOMETRIAL ABLATION  07/20/2010     LEEP PROCEDURE  9/2001    Loop       Stereotactic R breast Bx  2012    Benign  Stereotactic R breast Bx [Other][       TONSIL AND ADENOIDECTOMY         Social History        Substance Use Topics          Smoking status:   Former Smoker      Packs/day:  1.00      Years:  10.00      Types:  Cigarettes      Quit date:  1/1/2006      Smokeless tobacco:   Never Used      Alcohol use   1.5 oz/week     3 Cans of beer per week        Comment: occasionally, weekends         Current Outpatient Prescriptions       Medication  Sig Dispense Refill     clobetasol cream 0.05% (TEMOVATE) 0.05 % cream Apply to affected area twice daily for 2 weeks only 15 g 2     FLUoxetine (PROZAC) 20 mg capsule Take 1 capsule by mouth every morning. 90 capsule 4     No current facility-administered medications for this visit.      Medications have been reviewed by me and are current to the best of my knowledge and ability.      No Known Allergies    REVIEW OF SYSTEMS:  Refer to HPI.    EXAM:   Vitals:    /80 (Cuff Site: Right Arm, Position: Sitting, Cuff Size: Adult Regular)  Pulse 64  Ht 1.638 m (5' 4.5\")  Wt 73.5 kg (162 lb)  LMP 11/20/2014  BMI 27.38 kg/m2     Johanne is a very pleasant patient in no acute distress.   SKIN: Normal, no rashes noted.  Head Exam: Normal. Normocephalic, atraumatic.  Eye Exam:  No scleral icterus. No conjuntival erythema.  Normal external eye, conjunctiva, lids, cornea. JEANNE. No foreign body noted in eye or beneath eyelids. No periorbital cellulitis or swelling. The corneas are clear. No drainage or pustule.  EOMI with no nystagmus noted.  Ear Exam: Normal TM's bilaterally, grey, translucent, bony landmarks appreciated.   Left/Right TM: Effusion is not present. TM is not bulging. There is " no pus appreciated.  There is no hemotympanum.   Normal auditory canals and external ears. Non-tender.   Nose Exam: Normal external nose, mucus membranes, and septum.  OroPharynx Exam:  Non-erythematous posterior pharynx with no exudates.    Neck: No throat masses or thyroid enlargement.   NECK:  There is no spinous process tenderness.  There is no paraspinous tenderness.    LUNGS: Clear  CV: There is a regular rate and rhythm with normal S1 and S2, without murmur.   Abdomen: soft, bowel sounds regular, no masses or organomegaly.  MUSCULOSKELETAL: Full ROM of UEs and LEs.  Motor function is also normal at those levels. Strong peripheral pulses and normal capillary refill of the nailbeds noted. Skin is normal in appearance as well.   NEURO: The cranial nerves II-XII are intact and symmetric. DTR's are normal and symmetric in the upper and lower extremities.  Rhomberg is negative.  There are no abnormal cerebellar signs. Negative nose to finger exam.       PHQ Depression Screen  Date of PHQ exam: 18  Over the last 2 weeks, how often have you been bothered by any of the following problems?  1. Little interest or pleasure in doing things: 0 - Not at all  2. Feeling down, depressed, or hopeless: 1 - Several days  3. Trouble falling or staying asleep, or sleeping too much: 0 - Not at all  4. Feeling tired or having little energy: 3 - Nearly every day  5. Poor appetite or overeatin - Not at all  6. Feeling bad about yourself - or that you are a failure or have let yourself or your family down: 0 - Not at all  7. Trouble concentrating on things, such as reading the newspaper or watching television: 0 - Not at all  8. Moving or speaking so slowly that other people could have noticed. Or the opposite - being so fidgety or restless that you have been moving around a lot more than usual: 0 - Not at all  9. Thoughts that you would be better off dead, or of hurting yourself in some way: 0 - Not at all    PHQ-9 TOTAL  SCORE: 4  Depression Severity Level: none  If any answers were positive, how difficult have these problems made it for you to do your work, take care of things at home, or get along with other people: somewhat difficult    Results for orders placed or performed in visit on 01/08/18      BASIC METABOLIC PANEL      Result  Value Ref Range    SODIUM 138 133 - 143 mmol/L    POTASSIUM 4.2 3.5 - 5.1 mmol/L    CHLORIDE 102 98 - 107 mmol/L    CO2,TOTAL 27 21 - 31 mmol/L    ANION GAP 9 5 - 18                    GLUCOSE 96 70 - 105 mg/dL    CALCIUM 8.5 (L) 8.6 - 10.3 mg/dL    BUN 18 7 - 25 mg/dL    CREATININE 0.88 0.70 - 1.30 mg/dL    BUN/CREAT RATIO           20                    GFR if African American >60 >60 ml/min/1.73m2    GFR if not African American >60 >60 ml/min/1.73m2   TSH      Result  Value Ref Range    TSH 1.70 0.34 - 5.60 uIU/mL   SEDIMENTATION RATE      Result  Value Ref Range    SEDIMENTATION RATE        3 <21 mm/hr   CRP, inflammatory      Result  Value Ref Range    C-REACTIVE PROTEIN <1.000 <1.000 mg/dL   URINALYSIS W REFLEX MICROSCOPIC IF POSITIVE      Result  Value Ref Range    COLOR                     Yellow Yellow Color    CLARITY                   Clear Clear Clarity    SPECIFIC GRAVITY,URINE    <=1.005 (A) 1.010, 1.015, 1.020, 1.025                    PH,URINE                  6.0 6.0, 7.0, 8.0, 5.5, 6.5, 7.5, 8.5                    UROBILINOGEN,QUALITATIVE  Normal Normal EU/dl    PROTEIN, URINE Negative Negative mg/dL    GLUCOSE, URINE Negative Negative mg/dL    KETONES,URINE             Negative Negative mg/dL    BILIRUBIN,URINE           Negative Negative                    OCCULT BLOOD,URINE        Negative Negative                    NITRITE                   Negative Negative                    LEUKOCYTE ESTERASE        Negative Negative                   CBC WITH AUTO DIFFERENTIAL      Result  Value Ref Range    WHITE BLOOD COUNT         8.3 4.5 - 11.0 thou/cu mm    RED BLOOD COUNT            4.68 4.00 - 5.20 mil/cu mm    HEMOGLOBIN                14.4 12.0 - 16.0 g/dL    HEMATOCRIT                42.2 33.0 - 51.0 %    MCV                       90 80 - 100 fL    MCH                       30.8 26.0 - 34.0 pg    MCHC                      34.1 32.0 - 36.0 g/dL    RDW                       12.6 11.5 - 15.5 %    PLATELET COUNT            272 140 - 440 thou/cu mm    MPV                       10.1 6.5 - 11.0 fL    NEUTROPHILS               54.3 42.0 - 72.0 %    LYMPHOCYTES               34.3 20.0 - 44.0 %    MONOCYTES                 7.4 <12.0 %    EOSINOPHILS               2.5 <8.0 %    BASOPHILS                 1.3 <3.0 %    IMMATURE GRANULOCYTES(METAS,MYELOS,PROS) 0.2 %    ABSOLUTE NEUTROPHILS      4.5 1.7 - 7.0 thou/cu mm    ABSOLUTE LYMPHOCYTES      2.8 0.9 - 2.9 thou/cu mm    ABSOLUTE MONOCYTES        0.6 <0.9 thou/cu mm    ABSOLUTE EOSINOPHILS      0.2 <0.5 thou/cu mm    ABSOLUTE BASOPHILS        0.1 <0.3 thou/cu mm    ABSOLUTE IMMATURE GRANULOCYTES(METAS,MYELOS,PROS) 0.0 <=0.3 thou/cu mm       ASSESSMENT AND PLAN:      ICD-10-CM    1. Dizziness R42 CBC WITH DIFFERENTIAL      BASIC METABOLIC PANEL      TSH      SEDIMENTATION RATE      CRP, inflammatory      URINALYSIS W REFLEX MICROSCOPIC IF POSITIVE      CBC WITH DIFFERENTIAL      BASIC METABOLIC PANEL      TSH      SEDIMENTATION RATE      CRP, inflammatory      URINALYSIS W REFLEX MICROSCOPIC IF POSITIVE      CBC WITH AUTO DIFFERENTIAL   2. Thyroid cyst E04.1 US THYROID/PARATHYROID   3. Nausea R11.0 CBC WITH DIFFERENTIAL      BASIC METABOLIC PANEL      TSH      SEDIMENTATION RATE      CRP, inflammatory      URINALYSIS W REFLEX MICROSCOPIC IF POSITIVE      CBC WITH DIFFERENTIAL      BASIC METABOLIC PANEL      TSH      SEDIMENTATION RATE      CRP, inflammatory      URINALYSIS W REFLEX MICROSCOPIC IF POSITIVE      CBC WITH AUTO DIFFERENTIAL       Unremarkable neurologic exam today.  A head CT is not warranted at this time.  Patient had an unremarkable  CBC, CRP, ESR, TSH, BMP, urinalysis.  Symptoms are likely viral at this time. Encouraged increase of fluids and rest. Gave warning signs and symptoms.  Encouraged to monitor for chest pain, palpitations, problems breathing, fevers, increased dizziness, headache, not keeping food or fluids down, seizures.  Return for recheck in the next 1-2 weeks if symptoms are not resolving or worsening as needed.    Patient has history of a thyroid cyst in the past. Ordered repeat ultrasound for monitoring.    Greater than 25 minutes were spent in counseling and coordination of care.     Johanne Welch PA-C..................1/8/2018 8:20 AM

## 2018-02-12 NOTE — PROGRESS NOTES
Patient Information     Patient Name MRN Sex Johanne Gonzales [id number] Female 1970      Progress Notes [facility] at 2017  2:14 PM     Author:  [facility] Service:  (none) Author Type:  [employer] - Registered Radiologic Technologist     Filed:  2017  2:14 PM Date of Service:  2017  2:14 PM Status:  Signed     :  Ame Styles R.T. ([facility]) ([employer] - Registered Radiologic Technologist)            Falls Risk Criteria:    Age 65 and older or under age 4        Sensory deficits    Poor vision    Use of ambulatory aides    Impaired judgment    Unable to walk independently    Meets High Risk criteria for falls:  no

## 2018-02-12 NOTE — NURSING NOTE
Patient Information     Patient Name MRN Johanne Stanton [id number] Female 1970      Nursing Note by Mary Golden at 2018  8:00 AM     Author:  Mary Golden Service:  (none) Author Type:  (none)     Filed:  2018  8:20 AM Encounter Date:  2018 Status:  Signed     :  Mary Golden            Pt presents with headaches, and dizziness  x 2 weeks. Last episode was Saturday night.  Mary Golden

## 2018-02-13 NOTE — PROGRESS NOTES
Patient Information     Patient Name MRN Sex Johanne Gonzales [id number] Female 1970      Progress Notes by Johanne Welch PA-C at 2018 10:45 AM     Author:  Johanne Welch PA-C Service:  (none) Author Type:  PHYS- Physician Assistant     Filed:  2018 12:12 PM Encounter Date:  2018 Status:  Signed     :  Johanne Welch PA-C (PHYS- Physician Assistant)            Answers for HPI/ROS submitted by the patient on 2018   Sore throat  Chronicity: new  Onset: in the past 7 days  Progression since onset: gradually worsening  Pain worse on: neither  Fever: no fever  Pain - numeric: 5/10  abdominal pain: No  congestion: No  cough: Yes  diarrhea: No  drooling: No  ear discharge: No  ear pain: No  headaches: Yes  hoarse voice: Yes  neck pain: No  plugged ear sensation: No  shortness of breath: No  stridor: No  swollen glands: Yes  trouble swallowing: No  vomiting: No  strep: No  mono: No    Nursing Notes:   Bianca Burnham  2018 11:05 AM  Signed  Patient presents to the clinic for sore throat that she has had since Saturday.  Bianca Burnham LPN........................2018  10:47 AM      HPI:    Johanne Fish is a 47 y.o. female who presents for ST since Saturday. No fevers.  No ear pain. Ears are itchy.  No ear drainage. No sinus pain/pressure.  No GI symptoms.  Little cough. No wheezing, rattling. Sneezing. Little runny nose.     Past Medical History:     Diagnosis  Date     Cervical dysplasia     abnormal Paps, treated by loop excision.       DVT 2010     Lichen sclerosus et atrophicus 2005    external genital biopsy      Low back pain     Intermittent      PREMATURE VENTRICULAR CONTRACTIONS, FREQUENT        Past Surgical History:      Procedure  Laterality Date     BREAST BIOPSY Right 1997    benign disease.        ENDOMETRIAL ABLATION  2010     LEEP PROCEDURE  2001    Loop       Stereotactic R breast Bx      Benign  Stereotactic R breast Bx  [Other][       TONSIL AND ADENOIDECTOMY         Social History        Substance Use Topics          Smoking status:   Former Smoker      Packs/day:  1.00      Years:  10.00      Types:  Cigarettes      Quit date:  1/1/2006      Smokeless tobacco:   Never Used      Alcohol use   1.5 oz/week     3 Cans of beer per week        Comment: occasionally, weekends         Current Outpatient Prescriptions       Medication  Sig Dispense Refill     clobetasol cream 0.05% (TEMOVATE) 0.05 % cream Apply to affected area twice daily for 2 weeks only 15 g 2     FLUoxetine (PROZAC) 20 mg capsule Take 1 capsule by mouth every morning. 90 capsule 4     No current facility-administered medications for this visit.      Medications have been reviewed by me and are current to the best of my knowledge and ability.      No Known Allergies    REVIEW OF SYSTEMS:  Refer to HPI.    EXAM:   Vitals:    /68 (Cuff Site: Right Arm, Position: Sitting, Cuff Size: Adult Regular)  Pulse 76  Temp 98.5  F (36.9  C) (Tympanic)  Wt 74.4 kg (164 lb)  LMP 11/20/2014  Breastfeeding? No  BMI 27.72 kg/m2    General Appearance: Pleasant, alert, appropriate appearance for age. No acute distress  Ear Exam: Normal TM's bilaterally, grey, translucent, bony landmarks appreciated.   Left/Right TM: Effusion is not present. TM is not bulging. There is no pus appreciated.    Normal auditory canals and external ears. Non-tender.   OroPharynx Exam:  Erythematous posterior pharynx with no exudates. No sinus pain upon palpation of frontal, ethmoid, and maxillary sinuses.  Chest/Respiratory Exam: Normal chest wall and respirations. Clear to auscultation. No retractions appreciated.  Cardiovascular Exam: Regular rate and rhythm. S1, S2, no murmur, click, gallop, or rubs.  Lymphatic Exam: ACLN.  Skin: no rash or abnormalities  Psychiatric Exam: Alert and oriented - appropriate affect.    PHQ Depression Screen  Date of PHQ exam: 02/01/18  Over the last 2 weeks, how  often have you been bothered by any of the following problems?  1. Little interest or pleasure in doing things: 0 - Not at all  2. Feeling down, depressed, or hopeless: 0 - Not at all           LABS:    Results for orders placed or performed in visit on 02/01/18       RAPID STREP WITH REFLEX CULTURE       Result  Value Ref Range Status    STREP A ANTIGEN           Negative Negative Final       ASSESSMENT AND PLAN:      ICD-10-CM    1. Viral pharyngitis J02.9    2. Sore throat J02.9 RAPID STREP WITH REFLEX CULTURE      RAPID STREP WITH REFLEX CULTURE       Negative strep. No antibiotics warranted at this time.     Symptoms due to virus.      Patient Instructions   Negative strep. No antibiotics warranted at this time.     Symptoms due to virus. No antibiotic is needed at this time. Symptoms typically worse on days 3-4 and then begin improving each day. If symptoms begin worsening or fail to improve after 10 days, return to clinic for reevaluation.     May use symptomatic care with tylenol or ibuprofen. Sudafed or mucinex work well for congestion. May use cough syrup or cough drops.  Using a humidifier works well to break up the congestion. You can also sleep propped up on a couple pillows to decrease symptoms at night.    Please take tylenol as needed up to 4 times daily.  Treat symptomatically with warm salt water gargles.  Lozenges, Tylenol, Advil or Aleve as needed. Frequent swallows of cool liquid.  Oatmeal coats the throat and some patients find it soothes the pain. Encouraged warm teas or fluids with honey.     If you have sinus congestion -  Use a Neti Pot/sinus flush (Jet Med Sinus Rinse) 3 times daily to irrigate sinuses/mucosal tissue.     Monitor for any fevers or chills. Return in 7-10 days if not feeling better. Please call clinic with any questions or concerns. Return to clinic with change/worsening of symptoms.   Encouraged fluids and rest.    Call 9-1-1 or go to the emergency room if you:  Have  trouble breathing   Are drooling because you cannot swallow your saliva   Have swelling of the neck or tongue   Cannot move your neck or have trouble opening your mouth          Johanne Welch PA-C..................2/1/2018 11:05 AM

## 2018-02-13 NOTE — PATIENT INSTRUCTIONS
Patient Information     Patient Name MRN Johanne Stanton [id number] Female 1970      Patient Instructions by Johanne Welch PA-C at 2018 10:45 AM     Author:  Johanne Welch PA-C Service:  (none) Author Type:  PHYS- Physician Assistant     Filed:  2018 11:09 AM Encounter Date:  2018 Status:  Signed     :  Johanne Welch PA-C (PHYS- Physician Assistant)            Negative strep. No antibiotics warranted at this time.     Symptoms due to virus. No antibiotic is needed at this time. Symptoms typically worse on days 3-4 and then begin improving each day. If symptoms begin worsening or fail to improve after 10 days, return to clinic for reevaluation.     May use symptomatic care with tylenol or ibuprofen. Sudafed or mucinex work well for congestion. May use cough syrup or cough drops.  Using a humidifier works well to break up the congestion. You can also sleep propped up on a couple pillows to decrease symptoms at night.    Please take tylenol as needed up to 4 times daily.  Treat symptomatically with warm salt water gargles.  Lozenges, Tylenol, Advil or Aleve as needed. Frequent swallows of cool liquid.  Oatmeal coats the throat and some patients find it soothes the pain. Encouraged warm teas or fluids with honey.     If you have sinus congestion -  Use a Neti Pot/sinus flush (Jet Med Sinus Rinse) 3 times daily to irrigate sinuses/mucosal tissue.     Monitor for any fevers or chills. Return in 7-10 days if not feeling better. Please call clinic with any questions or concerns. Return to clinic with change/worsening of symptoms.   Encouraged fluids and rest.    Call  or go to the emergency room if you:  Have trouble breathing   Are drooling because you cannot swallow your saliva   Have swelling of the neck or tongue   Cannot move your neck or have trouble opening your mouth

## 2018-02-13 NOTE — NURSING NOTE
Patient Information     Patient Name MRJohanne Collazo [id number] Female 1970      Nursing Note by Bianca Burnham at 2018 10:45 AM     Author:  Bianca Burnham Service:  (none) Author Type:  (none)     Filed:  2018 11:05 AM Encounter Date:  2018 Status:  Signed     :  Bianca Burnham            Patient presents to the clinic for sore throat that she has had since Saturday.  Bianca Burnham LPN........................2018  10:47 AM

## 2018-02-25 ENCOUNTER — HEALTH MAINTENANCE LETTER (OUTPATIENT)
Age: 48
End: 2018-02-25

## 2018-03-05 ENCOUNTER — OFFICE VISIT (OUTPATIENT)
Dept: FAMILY MEDICINE | Facility: OTHER | Age: 48
End: 2018-03-05
Attending: PHYSICIAN ASSISTANT
Payer: COMMERCIAL

## 2018-03-05 VITALS
HEART RATE: 72 BPM | SYSTOLIC BLOOD PRESSURE: 104 MMHG | BODY MASS INDEX: 26.23 KG/M2 | WEIGHT: 163.2 LBS | HEIGHT: 66 IN | TEMPERATURE: 97.7 F | DIASTOLIC BLOOD PRESSURE: 60 MMHG

## 2018-03-05 DIAGNOSIS — R93.89 ABNORMAL THYROID ULTRASOUND: ICD-10-CM

## 2018-03-05 DIAGNOSIS — Z00.00 ROUTINE HISTORY AND PHYSICAL EXAMINATION OF ADULT: Primary | ICD-10-CM

## 2018-03-05 DIAGNOSIS — F41.1 ANXIETY, GENERALIZED: ICD-10-CM

## 2018-03-05 DIAGNOSIS — N63.15 BREAST LUMP ON RIGHT SIDE AT 3 O'CLOCK POSITION: ICD-10-CM

## 2018-03-05 DIAGNOSIS — Z01.419 PAP TEST, AS PART OF ROUTINE GYNECOLOGICAL EXAMINATION: ICD-10-CM

## 2018-03-05 DIAGNOSIS — F33.0 MAJOR DEPRESSIVE DISORDER, RECURRENT EPISODE, MILD (H): ICD-10-CM

## 2018-03-05 PROCEDURE — 87624 HPV HI-RISK TYP POOLED RSLT: CPT | Performed by: PHYSICIAN ASSISTANT

## 2018-03-05 PROCEDURE — 99396 PREV VISIT EST AGE 40-64: CPT | Performed by: PHYSICIAN ASSISTANT

## 2018-03-05 PROCEDURE — G0123 SCREEN CERV/VAG THIN LAYER: HCPCS | Performed by: PHYSICIAN ASSISTANT

## 2018-03-05 PROCEDURE — 88142 CYTOPATH C/V THIN LAYER: CPT | Mod: ZL | Performed by: PHYSICIAN ASSISTANT

## 2018-03-05 ASSESSMENT — PATIENT HEALTH QUESTIONNAIRE - PHQ9: 5. POOR APPETITE OR OVEREATING: NOT AT ALL

## 2018-03-05 ASSESSMENT — ANXIETY QUESTIONNAIRES
IF YOU CHECKED OFF ANY PROBLEMS ON THIS QUESTIONNAIRE, HOW DIFFICULT HAVE THESE PROBLEMS MADE IT FOR YOU TO DO YOUR WORK, TAKE CARE OF THINGS AT HOME, OR GET ALONG WITH OTHER PEOPLE: NOT DIFFICULT AT ALL
5. BEING SO RESTLESS THAT IT IS HARD TO SIT STILL: NOT AT ALL
GAD7 TOTAL SCORE: 0
2. NOT BEING ABLE TO STOP OR CONTROL WORRYING: NOT AT ALL
3. WORRYING TOO MUCH ABOUT DIFFERENT THINGS: NOT AT ALL
1. FEELING NERVOUS, ANXIOUS, OR ON EDGE: NOT AT ALL
6. BECOMING EASILY ANNOYED OR IRRITABLE: NOT AT ALL
7. FEELING AFRAID AS IF SOMETHING AWFUL MIGHT HAPPEN: NOT AT ALL

## 2018-03-05 NOTE — LETTER
Johanne Fish  [address] [place] [id number]    3/16/2018      Dear MsNaresh Abe,      We've received the results back from the laboratory for the samples you gave in clinic.  Your pap and HPV tests are normal. I would recommend repeating this important screening test in 5 years.      Please contact us at [phone] with any questions or concerns that you have.      Take Care,         Johanne Welch PA-C

## 2018-03-05 NOTE — PATIENT INSTRUCTIONS
"Ordered thyroid ultrasound.     Healthy Strategies  1. Eat at least 3 meals a day and never skip breakfast.  2. Eat more slowly.  3. Decrease portion size.  4. Provide structure by using meal replacement bars or shakes, and/or low calorie frozenmeals.  5. For good nutrition incorporate fruit, vegetables, whole grains, lean protein, and low-fat dairy.  6. Remove trigger foods from yourenvironment to avoid impulse eating.  7. Increase physical activity: get a pedometer and aim for 10,000 steps a day or 30-35 minutes of activity 5 days per week.  8. Weigh yourself daily or at least weekly.  9. Keep a record of what you eat and your activity.  10. Establish a support system such as afriend, group or program.    11. Read Froy Castro's \"Eat to Live\". Remember it is important to have a minimum of 1200 calories a day, okay to use olive oil, 40 grams of fiber daily. No more than two servings (the size of your palm) of red meat a week.     Please consider the following general health recommendations:    Schedule a mammogram annually starting at the age of 40 years old unless recommended earlier by your primary care provider. Come for a general exam once yearly.    Eat a quality diet (generally, low in simple sugars, starches, cholesterol and saturated fat.)    Please get 1500 mg of calcium in divided doses with 1500 units vitamin D in your diet daily. Take supplements as needed to obtain full recommended amounts.     Stay physically active. Regular walking or other exercise is one of the best ways to minimize pain of arthritis; maintain independence and mobility; maintain bone strength; maintain conditioning of your heart. Find something you enjoy and a friend to do it with you.    Maintain ideal weight. Your Body mass index is Body mass index is 26.74 kg/(m^2).. Generally a BMI of 20-25 is considered ideal. Overweight is defined as 25-30, obese is 30-35 and markedly obese is greater than 35.    Apply sun block (SPF 25 or " greater) on exposed skin anytime you are out in the sun to prevent skin cancer.     Wear a seatbelt whenever you are in a car.    Consider a bone density study every 2-5 years to see if you are at increased risk for fracture. If you have osteoporosis, medicine to strengthen your bones can significantly reduce your risk of fracture, back pain, and loss of height.    Colonoscopy (an exam of the colon) is recommended every 10 years after the age of 50 to screen for colon cancer. (More often if you are at increased risk.)    Obtain a flu shot every fall.    Receive a pneumonia shot series after the age of 65 (repeat in 5 years if you have other risk factors). This does not prevent all types of pneumonia, but reduces the risk of the worst bacterial causes of pneumonia.    You should have a tetanus booster at least once every 10 years.    A vaccine to reduce your chances of getting shingles is available if you are over 50. Information about the vaccine is available through the clinic or at:  (http://www.nlm.nih.gov/medlineplus/druginfo/medmaster/n560854.html)    Check blood sugar annually. Cholesterol annually unless you have had a normal level when last checked within 5 years.     I recommend that you have a general physical exam every year. You should have a pap test every 3 years between the ages of 21 and 30 and every 3-5 years between the ages of 30 and 65 depending on your test unless you have had previous abnormal pap smears, (in these cases the exams and PAP's should be done on a schedule as recommended by your primary care provider). If you have had hysterectomy in the past, your future Pap plan may be different.

## 2018-03-05 NOTE — NURSING NOTE
Patient presents to the clinic for physical.  MARY Sellers........................3/5/2018  8:13 AM

## 2018-03-05 NOTE — PROGRESS NOTES
"Nursing Notes:   Bianca Burnham LPN  3/5/2018  8:27 AM  Signed  Patient presents to the clinic for physical.  MARY Sellers........................3/5/2018  8:13 AM      ANNUAL PHYSICAL - FEMALE    HPI: [id number] who presents for a yearly exam.  Concerns include: need thyroid US recheck. More \"lazy\" recently. Tired with working a lot. Just decreased down to 1 job.   History of having an abnormal thyroid ultrasound in the past.  Needs recheck.    Concerned about a breast lump on the right breast medial side.  Unsure if it is changing.  Lumpy breasts in general.  No breast discharge.  Normal mammogram in December.    Needs refill of her fluoxetine 20 mg capsules.  No acute concerns at this time.  Anxiety and depression have been well controlled.  No done depressed thoughts.  No panic attacks or anxiety attacks.  No suicidal or homicidal ideation.  No weight changes recently.  Normal interest level.  Tolerating the medication well.    No LMP recorded. Patient is postmenopausal.   Contraception: none, last period was 1.5 years ago  Risk for STI?: no  Last pap: 9/11/2014, needs repeated  Any hx of abnormal paps:  Yes - over 10 years ago  FH of early CA?: no  Cholesterol/DM concerns/screening: nl in the past  Tobacco?: no  Calcium intake: minimal  DEXA: na  Last mammo: 12/13/2017  Colonoscopy: no  Immunizations: UTD    Patient Active Problem List    Diagnosis Date Noted     Abnormal thyroid ultrasound 03/05/2018     Priority: Medium     History of cervical dysplasia 01/18/2018     Priority: Medium     Other lichen, not elsewhere classified 01/18/2018     Priority: Medium     Major depressive disorder, recurrent episode, mild (H) 01/18/2018     Priority: Medium     Other premature beats 01/18/2018     Priority: Medium     Overview:   short AL interval, cardiac workup negative.  Observation recommended by Dr Fletcher.       Thyroid cyst 09/17/2014     Priority: Medium     Anxiety, generalized 11/01/2012     Priority: " Medium     Phlebitis and thrombophlebitis of other deep vessels of lower extremities 01/23/2010     Priority: Medium     Overview:   While on OCP's         Past Medical History:   Diagnosis Date     Dysplasia of cervix uteri     2001,abnormal Paps, treated by loop excision.     Lichen sclerosus et atrophicus     2005,external genital biopsy     Low back pain     Intermittent     Other premature depolarization     No Comments Provided     Phlebitis and thrombophlebitis of other deep vessels of unspecified lower extremity (H)     1/23/2010       Past Surgical History:   Procedure Laterality Date     BIOPSY BREAST  01/1997    benign disease.     BIOPSY BREAST  2012    Benign  Stereotactic R breast Bx [Other][     CONIZATION LEEP      9/2001,Loop     LAPAROSCOPIC ABLATION ENDOMETRIOSIS      07/20/2010     TONSILLECTOMY, ADENOIDECTOMY, COMBINED      No Comments Provided       Family History   Problem Relation Age of Onset     HEART DISEASE Father      Heart Disease,Congestive heart failure     Family History Negative Mother      Good Health     Other - See Comments Other      12 half-siblings, no history of chronic illnesses.     Breast Cancer Maternal Grandmother      Cancer-breast     Ovarian Cancer Sister 50     Cancer-ovarian,ovarian cancer     Other - See Comments Sister       hysterectomy for bleeding.       Social History     Social History     Marital status: Single     Spouse name: N/A     Number of children: N/A     Years of education: N/A     Occupational History     Not on file.     Social History Main Topics     Smoking status: Former Smoker     Packs/day: 1.00     Years: 10.00     Types: Cigarettes     Quit date: 1/1/2006     Smokeless tobacco: Never Used     Alcohol use 1.5 oz/week      Comment: Alcoholic Drinks/day: occasionally, weekends     Drug use: No      Comment: Drug use: No     Sexual activity: Yes     Partners: Male     Other Topics Concern     Not on file     Social History Narrative    Patient  "worked at [employer] as ; now working in the school district.  .  Lives in her own house with her younger child. Older child grown.    Tahir  Child - 09/14/91  Sean  Child - 05/05/94       Current Outpatient Prescriptions   Medication Sig Dispense Refill     FLUoxetine (PROZAC) 20 MG capsule Take 1 capsule (20 mg) by mouth every morning 90 capsule 3     clobetasol (TEMOVATE) 0.05 % cream Apply to affected area twice daily for 2 weeks only         No Known Allergies    REVIEW OF SYSTEMS:  Refer to HPI.    PHYSICAL EXAM:  /60 (BP Location: Right arm, Patient Position: Sitting, Cuff Size: Adult Regular)  Pulse 72  Temp 97.7  F (36.5  C) (Tympanic)  Ht 5' 5.5\" (1.664 m)  Wt 163 lb 3.2 oz (74 kg)  Breastfeeding? No  BMI 26.74 kg/m2  CONSTITUTIONAL:  Alert,cooperative, NAD.  EYES: No scleral icterus.  PERRLA.  Conjunctiva clear.  ENT/MOUTH: External ears and nose normal.  TMs normal.  Moist mucous membranes. Oropharynx clear.    ENDO: No thyromegaly or thyroidnodules.  LYMPH:  No cervical or supraclavicular LA.    BREASTS: No skin abnormalities, no erythema.  No nipple discharge, no axillary, supra- or infraclavicular LA.   2 lumps approximately 1 x 3 cm in diameter appreciated at 3:00 on the right breast.  CARDIOVASCULAR: Regular,S1, S2.  No S3 or S4.  No murmur/gallop/rub.  No peripheral edema.  RESPIRATORY: CTA bilaterally, no wheezes, rhonchi or rales.  GI: Bowel sounds wnl.  Soft, nontender, nondistended.  No masses or HSM.  No rebound orguarding.  : Vulva: normal, no lesions or discharge  Urethral meatus: normal size and location, no lesions or discharge  Urethra: no tenderness or masses  Bladder: no fullness or tenderness  Vagina: normal appearance, no abnormal discharge, no lesions.  No evidence of cystocele or rectocele.  Cervix: normal appearance, no lesions, no abnormal discharge, no cervical motion tenderness  Uterus: normal size and position, mobile, " non-tender  Adnexa: no palpable masses bilaterally. No cervical motion tenderness.  Pap smear obtained: yes  MSKEL: Grossly normal ROM.  Noclubbing.  INTEGUMENTARY:  Warm, dry.  No rash noted on exposed skin.  NEUROLOGIC: Facies symmetric.  Grossly normal movement and tone.  No tremor.  PSYCHIATRIC: Affect normal.  Speech fluent.      PHQ Depression Screen  PHQ-9 SCORE 1/25/2017 1/8/2018 3/5/2018   Total Score 5 4 1       Over the last two weeks, how often have you been bothered by any the following symptoms?  Please use the following scale;  0 = Not at all  1 = Several days but less than half  2 = More than half of the days  3 = Nearly every day    1) Little interest or pleasure in doing things [    ]  2) Feeling down, depressed, or hopeless.[    ]  3) Trouble falling or staying asleep, or sleeping too much [    ]  4) Feeling tired or having little energy.[    ]  5) Poor appetite or overeating [    ]  6) Feeling bad about yourself - or that you are a failure or have let yourself or your family down [    ]  7) Trouble concentrating on things, such as reading the newspaper or watching television [    ]  8) Moving or speaking so slowly that other people could have noticed. Or the opposite-being fidgety or restless that you have been moving around a lot more than usual [    ]  9) Thoughts that you would be better off dead, or of hurting yourself in some way [    ]    Finally, how difficult have these problems made it for you to do your work, take care of things at home, or get along with other people?  Not difficult at all, somewhat difficult, very difficult or extremely difficult?    ANDREA-7 SCORE 1/25/2017 2/1/2018 3/5/2018   Total Score 2 0 0               ASSESSMENT AND PLAN:    1. Routine history and physical examination of adult    2. Pap test, as part of routine gynecological examination    3. Abnormal thyroid ultrasound    4. Breast lump on right side at 3 o'clock position    5. Anxiety, generalized    6. Major  "depressive disorder, recurrent episode, mild (H)        Complete a Pap and HPV testing.  Results pending.    Ordered repeat thyroid ultrasound for monitoring.    Ordered mammogram and ultrasound due to right breast lump.    Recheck in 1 year for repeat physical.    Refilled fluoxetine 20 mg for anxiety depression.  No acute concerns at this time.    Patient Instructions   Ordered thyroid ultrasound.     Healthy Strategies  1. Eat at least 3 meals a day and never skip breakfast.  2. Eat more slowly.  3. Decrease portion size.  4. Provide structure by using meal replacement bars or shakes, and/or low calorie frozenmeals.  5. For good nutrition incorporate fruit, vegetables, whole grains, lean protein, and low-fat dairy.  6. Remove trigger foods from yourenvironment to avoid impulse eating.  7. Increase physical activity: get a pedometer and aim for 10,000 steps a day or 30-35 minutes of activity 5 days per week.  8. Weigh yourself daily or at least weekly.  9. Keep a record of what you eat and your activity.  10. Establish a support system such as afriend, group or program.    11. Read Froy Castro's \"Eat to Live\". Remember it is important to have a minimum of 1200 calories a day, okay to use olive oil, 40 grams of fiber daily. No more than two servings (the size of your palm) of red meat a week.     Please consider the following general health recommendations:    Schedule a mammogram annually starting at the age of 40 years old unless recommended earlier by your primary care provider. Come for a general exam once yearly.    Eat a quality diet (generally, low in simple sugars, starches, cholesterol and saturated fat.)    Please get 1500 mg of calcium in divided doses with 1500 units vitamin D in your diet daily. Take supplements as needed to obtain full recommended amounts.     Stay physically active. Regular walking or other exercise is one of the best ways to minimize pain of arthritis; maintain independence and " mobility; maintain bone strength; maintain conditioning of your heart. Find something you enjoy and a friend to do it with you.    Maintain ideal weight. Your Body mass index is Body mass index is 26.74 kg/(m^2).. Generally a BMI of 20-25 is considered ideal. Overweight is defined as 25-30, obese is 30-35 and markedly obese is greater than 35.    Apply sun block (SPF 25 or greater) on exposed skin anytime you are out in the sun to prevent skin cancer.     Wear a seatbelt whenever you are in a car.    Consider a bone density study every 2-5 years to see if you are at increased risk for fracture. If you have osteoporosis, medicine to strengthen your bones can significantly reduce your risk of fracture, back pain, and loss of height.    Colonoscopy (an exam of the colon) is recommended every 10 years after the age of 50 to screen for colon cancer. (More often if you are at increased risk.)    Obtain a flu shot every fall.    Receive a pneumonia shot series after the age of 65 (repeat in 5 years if you have other risk factors). This does not prevent all types of pneumonia, but reduces the risk of the worst bacterial causes of pneumonia.    You should have a tetanus booster at least once every 10 years.    A vaccine to reduce your chances of getting shingles is available if you are over 50. Information about the vaccine is available through the clinic or at:  (http://www.nlm.nih.gov/medlineplus/druginfo/medmaster/m332120.html)    Check blood sugar annually. Cholesterol annually unless you have had a normal level when last checked within 5 years.     I recommend that you have a general physical exam every year. You should have a pap test every 3 years between the ages of 21 and 30 and every 3-5 years between the ages of 30 and 65 depending on your test unless you have had previous abnormal pap smears, (in these cases the exams and PAP's should be done on a schedule as recommended by your primary care provider). If you  have had hysterectomy in the past, your future Pap plan may be different.               Relevant cancer screening discussed.    Counseled on healthy diet, Calcium and vitamin D intake, and exercise.    Johanne Welch ....................  3/5/2018   8:22 AM

## 2018-03-05 NOTE — MR AVS SNAPSHOT
"              After Visit Summary   3/5/2018    Johanne Fish    MRN: [id number]           Patient Information     Date Of Birth          1970        Visit Information        Provider Department      3/5/2018 8:00 AM Johanne Welch PA-C [facility]        Today's Diagnoses     Routine history and physical examination of adult    -  1    Pap test, as part of routine gynecological examination        Abnormal thyroid ultrasound          Care Instructions    Ordered thyroid ultrasound.     Healthy Strategies  1. Eat at least 3 meals a day and never skip breakfast.  2. Eat more slowly.  3. Decrease portion size.  4. Provide structure by using meal replacement bars or shakes, and/or low calorie frozenmeals.  5. For good nutrition incorporate fruit, vegetables, whole grains, lean protein, and low-fat dairy.  6. Remove trigger foods from yourenvironment to avoid impulse eating.  7. Increase physical activity: get a pedometer and aim for 10,000 steps a day or 30-35 minutes of activity 5 days per week.  8. Weigh yourself daily or at least weekly.  9. Keep a record of what you eat and your activity.  10. Establish a support system such as afriend, group or program.    11. Read Froy Castro's \"Eat to Live\". Remember it is important to have a minimum of 1200 calories a day, okay to use olive oil, 40 grams of fiber daily. No more than two servings (the size of your palm) of red meat a week.     Please consider the following general health recommendations:    Schedule a mammogram annually starting at the age of 40 years old unless recommended earlier by your primary care provider. Come for a general exam once yearly.    Eat a quality diet (generally, low in simple sugars, starches, cholesterol and saturated fat.)    Please get 1500 mg of calcium in divided doses with 1500 units vitamin D in your diet daily. Take supplements as needed to obtain full recommended amounts.     Stay physically " active. Regular walking or other exercise is one of the best ways to minimize pain of arthritis; maintain independence and mobility; maintain bone strength; maintain conditioning of your heart. Find something you enjoy and a friend to do it with you.    Maintain ideal weight. Your Body mass index is Body mass index is 26.74 kg/(m^2).. Generally a BMI of 20-25 is considered ideal. Overweight is defined as 25-30, obese is 30-35 and markedly obese is greater than 35.    Apply sun block (SPF 25 or greater) on exposed skin anytime you are out in the sun to prevent skin cancer.     Wear a seatbelt whenever you are in a car.    Consider a bone density study every 2-5 years to see if you are at increased risk for fracture. If you have osteoporosis, medicine to strengthen your bones can significantly reduce your risk of fracture, back pain, and loss of height.    Colonoscopy (an exam of the colon) is recommended every 10 years after the age of 50 to screen for colon cancer. (More often if you are at increased risk.)    Obtain a flu shot every fall.    Receive a pneumonia shot series after the age of 65 (repeat in 5 years if you have other risk factors). This does not prevent all types of pneumonia, but reduces the risk of the worst bacterial causes of pneumonia.    You should have a tetanus booster at least once every 10 years.    A vaccine to reduce your chances of getting shingles is available if you are over 50. Information about the vaccine is available through the clinic or at:  (http://www.nlm.nih.gov/medlineplus/druginfo/medmaster/m921100.html)    Check blood sugar annually. Cholesterol annually unless you have had a normal level when last checked within 5 years.     I recommend that you have a general physical exam every year. You should have a pap test every 3 years between the ages of 21 and 30 and every 3-5 years between the ages of 30 and 65 depending on your test unless you have had previous abnormal pap smears,  "(in these cases the exams and PAP's should be done on a schedule as recommended by your primary care provider). If you have had hysterectomy in the past, your future Pap plan may be different.                   Follow-ups after your visit        Follow-up notes from your care team     Return in about 1 year (around 3/5/2019) for Physical Exam.      Future tests that were ordered for you today     Open Future Orders        Priority Expected Expires Ordered    US Thyroid Routine  3/5/2019 3/5/2018            Who to contact     If you have questions or need follow up information about today's clinic visit or your schedule please contact [facility] directly at [phone].  Normal or non-critical lab and imaging results will be communicated to you by FantasySalesTeamhart, letter or phone within 4 business days after the clinic has received the results. If you do not hear from us within 7 days, please contact the clinic through FantasySalesTeamhart or phone. If you have a critical or abnormal lab result, we will notify you by phone as soon as possible.  Submit refill requests through Needbox [employer] or call your pharmacy and they will forward the refill request to us. Please allow 3 business days for your refill to be completed.          Additional Information About Your Visit        FantasySalesTeamharFindline Information     [employer] lets you send messages to your doctor, view your test results, renew your prescriptions, schedule appointments and more. To sign up, go to [email or web address] . Click on \"Log in\" on the left side of the screen, which will take you to the Welcome page. Then click on \"Sign up Now\" on the right side of the page.     You will be asked to enter the access code listed below, as well as some personal information. Please follow the directions to create your username and password.     Your access code is: [email or web address]  Expires: 6/3/2018  8:43 AM     Your access code will  in 90 days. If you need help or a new code, " "please call your [facility] clinic or [phone].        Care EveryWhere ID     This is your Care EveryWhere ID. This could be used by other organizations to access your [facility] medical records  [id number]        Your Vitals Were     Pulse Temperature Height Breastfeeding? BMI (Body Mass Index)       72 97.7  F (36.5  C) (Tympanic) 5' 5.5\" (1.664 m) No 26.74 kg/m2        Blood Pressure from Last 3 Encounters:   03/05/18 104/60   02/01/18 122/68   01/08/18 112/80    Weight from Last 3 Encounters:   03/05/18 163 lb 3.2 oz (74 kg)   02/01/18 164 lb (74.4 kg)   01/08/18 162 lb (73.5 kg)              We Performed the Following     HPV High Risk Types DNA Cervical     Pap Screen Thin Prep with HPV - recommended age 30 - 65 years (select HPV order below)        Primary Care Provider Office Phone # Fax #    Zuly Dyer [phone] [address] [facility] [address]        Equal Access to Services     [facility]: Hadii silvia ku hadasho Somichael, waaxda luqadaha, qaybta kaalmaprateek agustin, anna flores . So [facility] [phone].    ATENCIÓN: Si habla español, tiene a jackson disposición servicios gratuitos de asistencia lingüística. Erikca al [phone].    We comply with applicable federal civil rights laws and [place] laws. We do not discriminate on the basis of race, color, national origin, age, disability, sex, sexual orientation, or gender identity.            Thank you!     Thank you for choosing [facility] AND [facility]  for your care. Our goal is always to provide you with excellent care. Hearing back from our patients is one way we can continue to improve our services. Please take a few minutes to complete the written survey that you may receive in the mail after your visit with us. Thank you!             Your Updated Medication List - Protect others around you: Learn how to safely use, store and throw away your medicines at " www.disposemymeds.org.          This list is accurate as of 3/5/18  8:43 AM.  Always use your most recent med list.                   Brand Name Dispense Instructions for use Diagnosis    clobetasol 0.05 % cream    TEMOVATE     Apply to affected area twice daily for 2 weeks only        FLUoxetine 20 MG capsule    PROzac     Take 20 mg by mouth every morning

## 2018-03-06 ASSESSMENT — ANXIETY QUESTIONNAIRES: GAD7 TOTAL SCORE: 0

## 2018-03-06 ASSESSMENT — PATIENT HEALTH QUESTIONNAIRE - PHQ9: SUM OF ALL RESPONSES TO PHQ QUESTIONS 1-9: 1

## 2018-03-14 ENCOUNTER — HOSPITAL ENCOUNTER (OUTPATIENT)
Dept: ULTRASOUND IMAGING | Facility: OTHER | Age: 48
Discharge: HOME | End: 2018-03-14
Attending: PHYSICIAN ASSISTANT | Admitting: PHYSICIAN ASSISTANT
Payer: COMMERCIAL

## 2018-03-14 ENCOUNTER — HOSPITAL ENCOUNTER (OUTPATIENT)
Dept: MAMMOGRAPHY | Facility: OTHER | Age: 48
End: 2018-03-14
Attending: PHYSICIAN ASSISTANT
Payer: COMMERCIAL

## 2018-03-14 ENCOUNTER — HOSPITAL ENCOUNTER (OUTPATIENT)
Dept: ULTRASOUND IMAGING | Facility: OTHER | Age: 48
End: 2018-03-14
Attending: PHYSICIAN ASSISTANT
Payer: COMMERCIAL

## 2018-03-14 DIAGNOSIS — R93.89 ABNORMAL THYROID ULTRASOUND: ICD-10-CM

## 2018-03-14 DIAGNOSIS — N63.15 BREAST LUMP ON RIGHT SIDE AT 3 O'CLOCK POSITION: ICD-10-CM

## 2018-03-14 PROCEDURE — 77065 DX MAMMO INCL CAD UNI: CPT | Mod: RT

## 2018-03-14 PROCEDURE — 76536 US EXAM OF HEAD AND NECK: CPT

## 2018-03-14 PROCEDURE — 76641 ULTRASOUND BREAST COMPLETE: CPT | Mod: RT

## 2018-03-15 LAB
FINAL DIAGNOSIS: NORMAL
HPV HR 12 DNA CVX QL NAA+PROBE: NEGATIVE
HPV16 DNA SPEC QL NAA+PROBE: NEGATIVE
HPV18 DNA SPEC QL NAA+PROBE: NEGATIVE
SPECIMEN DESCRIPTION: NORMAL
SPECIMEN SOURCE CVX/VAG CYTO: NORMAL

## 2018-07-24 NOTE — PROGRESS NOTES
Patient Information     Patient Name  Johanne Fish MRN  [id number] Sex  Female   1970      Letter by Zuly Roberson DO at      Author:  Zuly Roberson DO Service:  (none) Author Type:  (none)    Filed:   Encounter Date:  2017 Status:  (Other)           Johanne Fish  [address] [facility] [address]          February 15, 2017    Dear Ms. Fish:    Following are the tests completed during your last clinic visit.  The results of these tests are normal and require no further attention unless otherwise noted.    Results for orders placed or performed in visit on 17      LIPID PANEL      Result  Value Ref Range    CHOLESTEROL,TOTAL 215 (H) <200 mg/dL    TRIGLYCERIDES 44 <150 mg/dL    HDL CHOLESTEROL 73 23 - 92 mg/dL    NON-HDL CHOLESTEROL 142 <145 mg/dl    CHOL/HDL RATIO            2.95 <4.50                    LDL CHOLESTEROL 133 (H) <100 mg/dL    PATIENT STATUS            FASTING                   TSH      Result  Value Ref Range    TSH 1.06 0.34 - 5.60 uIU/mL   BASIC METABOLIC PANEL      Result  Value Ref Range    SODIUM 137 133 - 143 mmol/L    POTASSIUM 4.0 3.5 - 5.1 mmol/L    CHLORIDE 103 98 - 107 mmol/L    CO2,TOTAL 28 21 - 31 mmol/L    ANION GAP 6 5 - 18                    GLUCOSE 80 70 - 105 mg/dL    CALCIUM 9.7 8.6 - 10.3 mg/dL    BUN 14 7 - 25 mg/dL    CREATININE 0.89 0.70 - 1.30 mg/dL    BUN/CREAT RATIO           16                    GFR if African American >60 >60 ml/min/1.73m2    GFR if not African American >60 >60 ml/min/1.73m2   ESTRADIOL      Result  Value Ref Range    ESTRADIOL GIH 26 pg/mL       If you have any further questions or problems contact my office at  [phone].    Thank you,    ZULY ROBERSON DO

## 2018-07-24 NOTE — PROGRESS NOTES
Patient Information     Patient Name  Johanne Fish MRN  [id number] Sex  Female   1970      Letter by Zuly Dyer DO at      Author:  Zuly Dyer DO Service:  (none) Author Type:  (none)    Filed:   Encounter Date:  2/3/2017 Status:  (Other)       Remember to activate your account quickly -   Your activation code expires in 45 days!    February 3, 2017     Johanne Fish  [address]  [place] [address]    Dear Ms. Fish,    Welcome to [facility]!        With [facility], you can quickly and easily view your health information and appointments at your clinic at any time and anywhere you have Internet access.  To learn more about [facility], please go to [email or web address]    To get started, you will need:     the [facility] web site ([email or web address])    your [facility] activation code:  [email or web address]    Expires: 3/20/2017 10:38 AM    the last 4 digits of your Social Security number (SSN)    your date of birth.      Step-by-step instructions on how to set up your [facility] account are shown on the following page.  If you requested access to your child/dependent s [facility] account or you have been granted access to another adult s [facility] account, instructions for viewing their information are also on the following page.     In [employer]                                    Instructions For Activating Your [facility] Account    Activation Code: [email or web address]  Expires: 3/20/2017 10:38 AM  Following are step-by-step instructions for setting up your [facility] log-in ID and personal password.  In the event you have technical difficulties, please call [facility] at [phone].    Step 1:   Go to the [facility] web page by typing [email or web address] in your Internet browser.      Step 2:   Click on the Enter your Activation Code link on the right side of the page under the  New User?  heading.     Step 3:   Enter your activation code (provided  above), the last four digits of your Social Security Number (SSN) and date of birth.  This information is only required once.  The next time you log in to [employer], you will only need your Lifestander[employer] ID and the password that you will create as part of this activation process.    Step 4:   The [employer] ID you used previously will be listed as the MyCTopicmarkst ID.   Please make a note of your MyCTopicmarkst ID. If you forget it, you will need to call [facility] to go through the reactivation process again or click on the Forgot MyCTopicmarkst ID link on the [employer] web page and follow the instructions.       Step 5:   Next, choose a password that is easy for you to remember, but hard for others to guess.  Your [facility] [employer] [facility] password must be a minimum of 7 characters and must contain at least one number and one letter.  No symbols are permitted.  Please keep in mind that only you will know your password and ID; [facility] Services will not have access to either of them.      Step 6:    Choose a security question. This will allow you to reset your password if you forget it or want to change it for some reason.    Step 7:    Enter the answer to your security question.  Please keep in mind that youranswer cannot be the same as your [facility] password.    Step 8:  Enter your e-mail address.  Your e-mail address is a required field.  You will receive an e-mail notification when new information is available in your [facility] account.  You are now ready to start using [facility] [employer]!    Instructions for Child/Dependent Access  To view your child s record, click on your child's name under the My Family s Records  heading.   Instructions for Adult Proxy Access  To view your adult proxy record, click on the adult's name under the My Family s   Records heading.     In the event you have technical difficulties, please call   [facility] at [phone].

## 2018-11-14 ENCOUNTER — ALLIED HEALTH/NURSE VISIT (OUTPATIENT)
Dept: FAMILY MEDICINE | Facility: OTHER | Age: 48
End: 2018-11-14
Attending: FAMILY MEDICINE
Payer: COMMERCIAL

## 2018-11-14 DIAGNOSIS — Z23 NEED FOR PROPHYLACTIC VACCINATION AND INOCULATION AGAINST INFLUENZA: Primary | ICD-10-CM

## 2018-11-14 PROCEDURE — 90686 IIV4 VACC NO PRSV 0.5 ML IM: CPT

## 2018-11-14 PROCEDURE — 90471 IMMUNIZATION ADMIN: CPT

## 2018-11-14 NOTE — PROGRESS NOTES

## 2018-11-14 NOTE — MR AVS SNAPSHOT
After Visit Summary   11/14/2018    Johanne Fish    MRN: [id number]           Patient Information     Date Of Birth          1970        Visit Information        Provider Department      11/14/2018 1:10 PM [facility]        Today's Diagnoses     Need for prophylactic vaccination and inoculation against influenza    -  1       Follow-ups after your visit        Who to contact     If you have questions or need follow up information about today's clinic visit or your schedule please contact [facility] directly at [phone].  Normal or non-critical lab and imaging results will be communicated to you by Bridge[employer], letter or phone within 4 business days after the clinic has received the results. If you do not hear from us within 7 days, please contact the clinic through [employer] or phone. If you have a critical or abnormal lab result, we will notify you by phone as soon as possible.  Submit refill requests through [employer] or call your pharmacy and they will forward the refill request to us. Please allow 3 business days for your refill to be completed.          Additional Information About Your Visit        MyChart Information     [employer] gives you secure access to your electronic health record. If you see a primary care provider, you can also send messages to your care team and make appointments. If you have questions, please call your primary care clinic.  If you do not have a primary care provider, please call [phone] and they will assist you.        Care EveryWhere ID     This is your Care EveryWhere ID. This could be used by other organizations to access your [facility] medical records  [id number]         Blood Pressure from Last 3 Encounters:   03/05/18 104/60   02/01/18 122/68   01/08/18 112/80    Weight from Last 3 Encounters:   03/05/18 163 lb 3.2 oz (74 kg)   02/01/18 164 lb (74.4 kg)   01/08/18 162 lb (73.5 kg)              We  Performed the Following     HC FLU VAC PRESRV FREE QUAD SPLIT VIR 3+YRS IM     Vaccine Administration, Initial [[id number]]        Primary Care Provider Office Phone # Fax #    Zuly Dyer [phone] [address]   [place] [address]        Equal Access to Services     [facility] Center[facility] : Hadii aad ku hadasho Soomaali, waaxda luqadaha, qaybta kaalmada adeegyada, waxay vickie haytanisha flores . So [facility] [phone].    ATENCIÓN: Si habla español, tiene a jackson disposición servicios gratuitos de asistencia lingüística. Llame al [phone].    We comply with applicable federal civil rights laws and [place] laws. We do not discriminate on the basis of race, color, national origin, age, disability, sex, sexual orientation, or gender identity.            Thank you!     Thank you for choosing [facility] AND [facility]  for your care. Our goal is always to provide you with excellent care. Hearing back from our patients is one way we can continue to improve our services. Please take a few minutes to complete the written survey that you may receive in the mail after your visit with us. Thank you!             Your Updated Medication List - Protect others around you: Learn how to safely use, store and throw away your medicines at www.disposemymeds.org.          This list is accurate as of 11/14/18  1:17 PM.  Always use your most recent med list.                   Brand Name Dispense Instructions for use Diagnosis    clobetasol 0.05 % cream    TEMOVATE     Apply to affected area twice daily for 2 weeks only        FLUoxetine 20 MG capsule    PROzac    90 capsule    Take 1 capsule (20 mg) by mouth every morning    Anxiety, generalized, Major depressive disorder, recurrent episode, mild (H)

## 2018-12-05 ENCOUNTER — HOSPITAL ENCOUNTER (OUTPATIENT)
Dept: GENERAL RADIOLOGY | Facility: OTHER | Age: 48
Discharge: HOME | End: 2018-12-05
Attending: PHYSICIAN ASSISTANT | Admitting: PHYSICIAN ASSISTANT
Payer: COMMERCIAL

## 2018-12-05 ENCOUNTER — OFFICE VISIT (OUTPATIENT)
Dept: FAMILY MEDICINE | Facility: OTHER | Age: 48
End: 2018-12-05
Attending: PHYSICIAN ASSISTANT
Payer: COMMERCIAL

## 2018-12-05 ENCOUNTER — HOSPITAL ENCOUNTER (OUTPATIENT)
Dept: GENERAL RADIOLOGY | Facility: OTHER | Age: 48
End: 2018-12-05
Attending: PHYSICIAN ASSISTANT
Payer: COMMERCIAL

## 2018-12-05 VITALS
SYSTOLIC BLOOD PRESSURE: 92 MMHG | DIASTOLIC BLOOD PRESSURE: 60 MMHG | RESPIRATION RATE: 18 BRPM | TEMPERATURE: 96.8 F | WEIGHT: 169 LBS | BODY MASS INDEX: 27.7 KG/M2 | HEART RATE: 84 BPM

## 2018-12-05 DIAGNOSIS — F41.1 ANXIETY, GENERALIZED: Primary | ICD-10-CM

## 2018-12-05 DIAGNOSIS — R07.81 RIB PAIN ON LEFT SIDE: ICD-10-CM

## 2018-12-05 DIAGNOSIS — F33.0 MAJOR DEPRESSIVE DISORDER, RECURRENT EPISODE, MILD (H): ICD-10-CM

## 2018-12-05 DIAGNOSIS — N64.4 BREAST PAIN, LEFT: ICD-10-CM

## 2018-12-05 PROCEDURE — 71101 X-RAY EXAM UNILAT RIBS/CHEST: CPT | Mod: LT

## 2018-12-05 PROCEDURE — 99214 OFFICE O/P EST MOD 30 MIN: CPT | Performed by: PHYSICIAN ASSISTANT

## 2018-12-05 RX ORDER — FLUOXETINE 10 MG/1
10 CAPSULE ORAL DAILY
Qty: 90 CAPSULE | Refills: 3 | Status: SHIPPED | OUTPATIENT
Start: 2018-12-05 | End: 2019-10-15

## 2018-12-05 ASSESSMENT — PATIENT HEALTH QUESTIONNAIRE - PHQ9
SUM OF ALL RESPONSES TO PHQ QUESTIONS 1-9: 4
5. POOR APPETITE OR OVEREATING: NOT AT ALL

## 2018-12-05 ASSESSMENT — ANXIETY QUESTIONNAIRES
6. BECOMING EASILY ANNOYED OR IRRITABLE: NOT AT ALL
1. FEELING NERVOUS, ANXIOUS, OR ON EDGE: NOT AT ALL
GAD7 TOTAL SCORE: 1
7. FEELING AFRAID AS IF SOMETHING AWFUL MIGHT HAPPEN: NOT AT ALL
5. BEING SO RESTLESS THAT IT IS HARD TO SIT STILL: NOT AT ALL
3. WORRYING TOO MUCH ABOUT DIFFERENT THINGS: NOT AT ALL
2. NOT BEING ABLE TO STOP OR CONTROL WORRYING: SEVERAL DAYS

## 2018-12-05 NOTE — PROGRESS NOTES
Nursing Notes:   Ann Marie Martin LPN  12/5/2018  3:54 PM  Signed  Patient presents to clinic with c/o tenderness under left arm, left shoulder.  Dimple Veronica HERNANDEZ ...... 12/5/2018 3:48 PM      Chief Complaint   Patient presents with     Pain         Medication Reconciliation: complete    Ann Marie Martin LPN      HPI:    Johanne Fish is a 48 year old female who presents for tenderness under left arm, left shoulder. Present for months. Shoulder blade pain feeling better. Pain with reaching left arm forward.  Right handed.  No known trauma.  No bruising or swelling.  No chest pressure, palpitations, racing heart, problems breathing, wheezing, rattling, lightheadedness, dizziness.  No history of heart concerns in the past.  No breast lumps or nipple discharge appreciated.    Depression: No interest. No energy.  More down/depressed.  She has been feeling more depressed.  Currently taking fluoxetine 20 mg.  Wondering about increasing her dose to better control her symptoms.  No side effects noted with the fluoxetine.  No suicidal or homicidal ideation.  No panic attacks or anxiety attacks.    Past Medical History:   Diagnosis Date     Dysplasia of cervix uteri     2001,abnormal Paps, treated by loop excision.     Lichen sclerosus et atrophicus     2005,external genital biopsy     Low back pain     Intermittent     Other premature depolarization     No Comments Provided     Phlebitis and thrombophlebitis of other deep vessels of unspecified lower extremity (H)     1/23/2010       Past Surgical History:   Procedure Laterality Date     BIOPSY BREAST  01/1997    benign disease.     BIOPSY BREAST  2012    Benign  Stereotactic R breast Bx [Other][     CONIZATION LEEP      9/2001,Loop     LAPAROSCOPIC ABLATION ENDOMETRIOSIS      07/20/2010     TONSILLECTOMY, ADENOIDECTOMY, COMBINED      No Comments Provided       Family History   Problem Relation Age of Onset     Heart Disease Father      Heart  Disease,Congestive heart failure     Family History Negative Mother      Good Health     Other - See Comments Other      12 half-siblings, no history of chronic illnesses.     Breast Cancer Maternal Grandmother      Cancer-breast     Ovarian Cancer Sister 50     Cancer-ovarian,ovarian cancer     Other - See Comments Sister       hysterectomy for bleeding.       Social History     Social History     Marital status: Single     Spouse name: N/A     Number of children: N/A     Years of education: N/A     Occupational History     Not on file.     Social History Main Topics     Smoking status: Former Smoker     Packs/day: 1.00     Years: 10.00     Types: Cigarettes     Quit date: 1/1/2006     Smokeless tobacco: Never Used     Alcohol use 1.5 oz/week      Comment: Alcoholic Drinks/day: occasionally, weekends     Drug use: No      Comment: Drug use: No     Sexual activity: Yes     Partners: Male     Other Topics Concern     Not on file     Social History Narrative    Patient worked at [employer] as ; now working in the school district.  .  Lives in her own house with her younger child. Older child grown.    Tahir  Child - 09/14/91  Sean  Child - 05/05/94       Current Outpatient Prescriptions   Medication Sig Dispense Refill     clobetasol (TEMOVATE) 0.05 % cream Apply to affected area twice daily for 2 weeks only       FLUoxetine (PROZAC) 10 MG capsule Take 1 capsule (10 mg) by mouth daily . Take in combination with the 20 mg for a total of 30 mg. 90 capsule 3     FLUoxetine (PROZAC) 20 MG capsule Take 1 capsule (20 mg) by mouth every morning . Take in combination with the 10 mg for a total of 30 mg. 90 capsule 3       No Known Allergies    REVIEW OF SYSTEMS:  Refer to HPI.    EXAM:   Vitals:    BP 92/60 (BP Location: Right arm, Patient Position: Sitting, Cuff Size: Adult Regular)  Pulse 84  Temp 96.8  F (36  C)  Resp 18  Wt 169 lb (76.7 kg)  BMI 27.7 kg/m2    General Appearance:  Pleasant, alert, appropriate appearance for age. No acute distress  Chest/Respiratory Exam: Normal chest wall and respirations. Clear to auscultation.  Breast Exam: No dimpling, nipple retraction or discharge. No masses or nodes. left breast pain at 2:00 approx 5 cm away from nipple for a few months, no lumps or nipple discharge.   Cardiovascular Exam: Regular rate and rhythm. S1, S2, no murmur, click, gallop, or rubs.  Musculoskeletal Exam: Back is straight and non-tender, full ROM of upper and lower extremities without discomfort.  Left lateral mid rib pain with palpation.  No bruising or swelling appreciated.  Skin: no rash or abnormalities  Neurologic Exam: Nonfocal, normal gross motor, tone coordination and no tremor.  Psychiatric Exam: Alert and oriented - appropriate affect.    PHQ Depression Screen  PHQ-9 SCORE 1/8/2018 3/5/2018 12/5/2018   PHQ-9 Total Score 4 1 4       ASSESSMENT AND PLAN:    1. Anxiety, generalized    2. Major depressive disorder, recurrent episode, mild (H)    3. Rib pain on left side    4. Breast pain, left          Completed chest/rib xray.  I personally reviewed the xray. I found no concerns appreciated upon initial read of xray.  Final read pending by radiology.    Left breast/rib pain:  Ordered mammogram and ultrasound to rule out concerns.  Completed left rib and chest x-ray to rule out concerns.  Encouraged to take ibuprofen for relief up to 4 times per day.  Encouraged rest and elevation.  Encouraged to use ice or heat 15 minutes at a time several times per day to decrease pain. Return to clinic in 1-2 weeks as necessary for persistent pain. Return to clinic with any change or worsening of symptoms.     Anxiety and depression:  Increase fluoxetine to 30 mg daily.  Encouraged good diet and exercise.   Encouraged to see a counselor.   Return in 4-6 weeks for recheck.   Return to clinic with change/worsening of symptoms.     Greater than 25 minutes were spent in counseling and  coordination of care.     Patient Instructions     Left breast/rib pain:  Ordered mammogram and ultrasound to rule out concerns.  Completed left rib and chest x-ray to rule out concerns.  Encouraged to take ibuprofen for relief up to 4 times per day.  Encouraged rest and elevation.  Encouraged to use ice or heat 15 minutes at a time several times per day to decrease pain. Return to clinic in 1-2 weeks as necessary for persistent pain. Return to clinic with any change or worsening of symptoms.     Anxiety and depression:  Increase fluoxetine to 30 mg daily.  Encouraged good diet and exercise.   Encouraged to see a counselor.   Return in 4-6 weeks for recheck.   Return to clinic with change/worsening of symptoms.       Suicide Emergency First call for help:  [phone]    Depression: Tips to Help Yourself  As your healthcare providers help treat your depression, you can also help yourself. Keep in mind that your illness affects you emotionally, physically, mentally, and socially. So full recovery will take time. Take care of your body and your soul, and be patient with yourself as you get better.    Self-care    Educate yourself. Read about treatment and medicine options. If you have the energy, attend local conferences or support groups. Keep a list of useful websites and helpful books and use them as needed. This illness is not your fault. Don t blame yourself for your depression.    Manage early symptoms. If you notice symptoms returning, experience triggers, or identify other factors that may lead to a depressive episode, get help as soon as possible. Ask trusted friends and family to monitor your behavior and let you know if they see anything of concern.    Work with your provider. Find a provider you can trust. Communicate honestly with that person and share information on your treatment for depression and your reaction to medicines.    Be prepared for a crisis. Know what to do if you  experience a crisis. Keep the phone number of a crisis hotline and know the location of your community's urgent care centers and the closest emergency department.    Hold off on big decisions. Depression can cloud your judgment. So wait until you feel better before making major life decisions, such as changing jobs, moving, or getting  or .    Be patient. Recovering from depression is a process. Don t be discouraged if it takes some time to feel better.    Keep it simple. Depression saps your energy and concentration. So you won t be able to do all the things you used to do. Set small goals and do what you can.    Be with others. Don t isolate yourself--you ll only feel worse. Try to be with other people. And take part in fun activities when you can. Go to a movie, ballgame, Mu-ism service, or social event. Talk openly with people you can trust. And accept help when it s offered.  Take care of your body  People with depression often lose the desire to take care of themselves. That only makes their problems worse. During treatment and afterward, make a point to:    Exercise. It s a great way to take care of your body. And studies have shown that exercise helps fight depression.    Avoid drugs and alcohol. These may ease the pain in the short term. But they ll only make your problems worse in the long run.    Get relief from stress. Ask your healthcare provider for relaxation exercises and techniques to help relieve stress.    Eat right. A balanced and healthy diet helps keep your body healthy.  Date Last Reviewed: 1/1/2017 2000-2017 [employer]. [address] [place] [address], [place] [address]. All rights reserved. This information is not intended as a substitute for professional medical care. Always follow your healthcare professional's instructions.         Treating Anxiety Disorders with Therapy    If you have an anxiety disorder, you don t have to suffer anymore. Treatment is  available. Therapy (also called counseling) is often a helpful treatment for anxiety disorders. With therapy, a specially trained professional (therapist) helps you face and learn to manage your anxiety. Therapy can be short-term or long-term depending on your needs. In some cases, medicine may also be prescribed with therapy. It may take time before you notice how much therapy is helping, but stick with it. With therapy, you can feel better.  Cognitive behavioral therapy (CBT)  Cognitive behavioral therapy (CBT) teaches you to manage anxiety. It does this by helping you understand how you think and act when you re anxious. Research has shown CBT to be a very effective treatment for anxiety disorders. How CBT is run is almost like a class. It involves homework and activities to build skills that teach you to cope with anxiety step by step. It can be done in a group or one-on-one, and often takes place for a set number of sessions. CBT has two main parts:    Cognitive therapy helps you identify the negative, irrational thoughts that occur with your anxiety. You ll learn to replace these with more positive, realistic thoughts.    Behavioral therapy helps you change how you react to anxiety. You ll learn coping skills and methods for relaxing to help you better deal with anxiety.  Other forms of therapy  Other therapy methods may work better for you than CBT. Or, you may move from CBT to another form of therapy as your treatment needs change. This may mean meeting with a therapist by yourself or in a group. Therapy can also help you work through problems in your life, such as drug or alcohol dependence, that may be making your anxiety worse.  Getting better takes time  Therapy will help you feel better and teach you skills to help manage anxiety long term. But change doesn t happen right away. It takes a commitment from you. And treatment only works if you learn to face the causes of your anxiety. So, you might feel  worse before you feel better. This can sometimes make it hard to stick with it. But remember: Therapy is a very effective treatment. The results will be well worth it.  Helping yourself  If anxiety is wearing you down, here are some things you can do to cope:    Check with your doctor and rule out any physical problems that may be causing the anxiety symptoms.    If an anxiety disorder is diagnosed, seek mental healthcare. This is an illness and it can respond to treatment. Most types of anxiety disorders will respond to talk therapy and medicine.    Educate yourself about anxiety disorders. Keep track of helpful online resources and books you can use during stressful periods.    Try stress management techniques such as meditation.    Consider online or in-person support groups.    Don t fight your feelings. Anxiety feeds itself. The more you worry about it, the worse it gets. Instead, try to identify what might have triggered your anxiety. Then try to put this threat in perspective.    Keep in mind that you can t control everything about a situation. Change what you can and let the rest take its course.    Exercise -- it s a great way to relieve tension and help your body feel relaxed.    Examine your life for stress, and try to find ways to reduce it.    Avoid caffeine and nicotine, which can make anxiety symptoms worse.    Fight the temptation to turn to alcohol or unprescribed drugs for relief. They only make things worse in the long run.   Date Last Reviewed: 1/1/2017 2000-2017 [employer]. [address] [place] [address]. All rights reserved. This information is not intended as a substitute for professional medical care. Always follow your healthcare professional's instructions.               Johanne Welch PA-C..................12/5/2018 3:52 PM

## 2018-12-05 NOTE — PATIENT INSTRUCTIONS
Left breast/rib pain:  Ordered mammogram and ultrasound to rule out concerns.  Completed left rib and chest x-ray to rule out concerns.  Encouraged to take ibuprofen for relief up to 4 times per day.  Encouraged rest and elevation.  Encouraged to use ice or heat 15 minutes at a time several times per day to decrease pain. Return to clinic in 1-2 weeks as necessary for persistent pain. Return to clinic with any change or worsening of symptoms.     Anxiety and depression:  Increase fluoxetine to 30 mg daily.  Encouraged good diet and exercise.   Encouraged to see a counselor.   Return in 4-6 weeks for recheck.   Return to clinic with change/worsening of symptoms.       Suicide Emergency First call for help:  [phone]    Depression: Tips to Help Yourself  As your healthcare providers help treat your depression, you can also help yourself. Keep in mind that your illness affects you emotionally, physically, mentally, and socially. So full recovery will take time. Take care of your body and your soul, and be patient with yourself as you get better.    Self-care    Educate yourself. Read about treatment and medicine options. If you have the energy, attend local conferences or support groups. Keep a list of useful websites and helpful books and use them as needed. This illness is not your fault. Don t blame yourself for your depression.    Manage early symptoms. If you notice symptoms returning, experience triggers, or identify other factors that may lead to a depressive episode, get help as soon as possible. Ask trusted friends and family to monitor your behavior and let you know if they see anything of concern.    Work with your provider. Find a provider you can trust. Communicate honestly with that person and share information on your treatment for depression and your reaction to medicines.    Be prepared for a crisis. Know what to do if you experience a crisis. Keep the phone number of a crisis  hotline and know the location of your community's urgent care centers and the closest emergency department.    Hold off on big decisions. Depression can cloud your judgment. So wait until you feel better before making major life decisions, such as changing jobs, moving, or getting  or .    Be patient. Recovering from depression is a process. Don t be discouraged if it takes some time to feel better.    Keep it simple. Depression saps your energy and concentration. So you won t be able to do all the things you used to do. Set small goals and do what you can.    Be with others. Don t isolate yourself--you ll only feel worse. Try to be with other people. And take part in fun activities when you can. Go to a movie, ballgame, Buddhism service, or social event. Talk openly with people you can trust. And accept help when it s offered.  Take care of your body  People with depression often lose the desire to take care of themselves. That only makes their problems worse. During treatment and afterward, make a point to:    Exercise. It s a great way to take care of your body. And studies have shown that exercise helps fight depression.    Avoid drugs and alcohol. These may ease the pain in the short term. But they ll only make your problems worse in the long run.    Get relief from stress. Ask your healthcare provider for relaxation exercises and techniques to help relieve stress.    Eat right. A balanced and healthy diet helps keep your body healthy.  Date Last Reviewed: 1/1/2017 2000-2017 [employer]. [address] [place] [address]. All rights reserved. This information is not intended as a substitute for professional medical care. Always follow your healthcare professional's instructions.         Treating Anxiety Disorders with Therapy    If you have an anxiety disorder, you don t have to suffer anymore. Treatment is available. Therapy (also called counseling) is often a helpful  treatment for anxiety disorders. With therapy, a specially trained professional (therapist) helps you face and learn to manage your anxiety. Therapy can be short-term or long-term depending on your needs. In some cases, medicine may also be prescribed with therapy. It may take time before you notice how much therapy is helping, but stick with it. With therapy, you can feel better.  Cognitive behavioral therapy (CBT)  Cognitive behavioral therapy (CBT) teaches you to manage anxiety. It does this by helping you understand how you think and act when you re anxious. Research has shown CBT to be a very effective treatment for anxiety disorders. How CBT is run is almost like a class. It involves homework and activities to build skills that teach you to cope with anxiety step by step. It can be done in a group or one-on-one, and often takes place for a set number of sessions. CBT has two main parts:    Cognitive therapy helps you identify the negative, irrational thoughts that occur with your anxiety. You ll learn to replace these with more positive, realistic thoughts.    Behavioral therapy helps you change how you react to anxiety. You ll learn coping skills and methods for relaxing to help you better deal with anxiety.  Other forms of therapy  Other therapy methods may work better for you than CBT. Or, you may move from CBT to another form of therapy as your treatment needs change. This may mean meeting with a therapist by yourself or in a group. Therapy can also help you work through problems in your life, such as drug or alcohol dependence, that may be making your anxiety worse.  Getting better takes time  Therapy will help you feel better and teach you skills to help manage anxiety long term. But change doesn t happen right away. It takes a commitment from you. And treatment only works if you learn to face the causes of your anxiety. So, you might feel worse before you feel better. This can sometimes make it hard to  stick with it. But remember: Therapy is a very effective treatment. The results will be well worth it.  Helping yourself  If anxiety is wearing you down, here are some things you can do to cope:    Check with your doctor and rule out any physical problems that may be causing the anxiety symptoms.    If an anxiety disorder is diagnosed, seek mental healthcare. This is an illness and it can respond to treatment. Most types of anxiety disorders will respond to talk therapy and medicine.    Educate yourself about anxiety disorders. Keep track of helpful online resources and books you can use during stressful periods.    Try stress management techniques such as meditation.    Consider online or in-person support groups.    Don t fight your feelings. Anxiety feeds itself. The more you worry about it, the worse it gets. Instead, try to identify what might have triggered your anxiety. Then try to put this threat in perspective.    Keep in mind that you can t control everything about a situation. Change what you can and let the rest take its course.    Exercise -- it s a great way to relieve tension and help your body feel relaxed.    Examine your life for stress, and try to find ways to reduce it.    Avoid caffeine and nicotine, which can make anxiety symptoms worse.    Fight the temptation to turn to alcohol or unprescribed drugs for relief. They only make things worse in the long run.   Date Last Reviewed: 1/1/2017 2000-2017 [employer]. [address] [place] [address], [place] [address]. All rights reserved. This information is not intended as a substitute for professional medical care. Always follow your healthcare professional's instructions.

## 2018-12-05 NOTE — NURSING NOTE
Patient presents to clinic with c/o tenderness under left arm, left shoulder.  Dimple Martin LPN ...... 12/5/2018 3:48 PM      Chief Complaint   Patient presents with     Pain         Medication Reconciliation: complete    Ann Marie Martin LPN

## 2018-12-07 ASSESSMENT — ANXIETY QUESTIONNAIRES: GAD7 TOTAL SCORE: 1

## 2018-12-17 ENCOUNTER — HOSPITAL ENCOUNTER (OUTPATIENT)
Dept: ULTRASOUND IMAGING | Facility: OTHER | Age: 48
Discharge: HOME | End: 2018-12-17
Attending: PHYSICIAN ASSISTANT | Admitting: PHYSICIAN ASSISTANT
Payer: COMMERCIAL

## 2018-12-17 ENCOUNTER — OFFICE VISIT (OUTPATIENT)
Dept: FAMILY MEDICINE | Facility: OTHER | Age: 48
End: 2018-12-17
Attending: PHYSICIAN ASSISTANT
Payer: COMMERCIAL

## 2018-12-17 VITALS
BODY MASS INDEX: 27.7 KG/M2 | DIASTOLIC BLOOD PRESSURE: 76 MMHG | WEIGHT: 169 LBS | RESPIRATION RATE: 18 BRPM | SYSTOLIC BLOOD PRESSURE: 120 MMHG | TEMPERATURE: 97.6 F | HEART RATE: 78 BPM | OXYGEN SATURATION: 98 %

## 2018-12-17 DIAGNOSIS — M79.662 PAIN OF LEFT CALF: ICD-10-CM

## 2018-12-17 DIAGNOSIS — S86.812A STRAIN OF CALF MUSCLE, LEFT, INITIAL ENCOUNTER: Primary | ICD-10-CM

## 2018-12-17 LAB
BASOPHILS # BLD AUTO: 0.1 10E9/L (ref 0–0.2)
BASOPHILS NFR BLD AUTO: 1.1 %
D DIMER PPP DDU-MCNC: <200 NG/ML D-DU (ref 0–230)
DIFFERENTIAL METHOD BLD: NORMAL
EOSINOPHIL # BLD AUTO: 0.2 10E9/L (ref 0–0.7)
EOSINOPHIL NFR BLD AUTO: 2.3 %
ERYTHROCYTE [DISTWIDTH] IN BLOOD BY AUTOMATED COUNT: 13 % (ref 10–15)
HCT VFR BLD AUTO: 41 % (ref 35–47)
HGB BLD-MCNC: 13.7 G/DL (ref 11.7–15.7)
IMM GRANULOCYTES # BLD: 0 10E9/L (ref 0–0.4)
IMM GRANULOCYTES NFR BLD: 0.3 %
LYMPHOCYTES # BLD AUTO: 2.3 10E9/L (ref 0.8–5.3)
LYMPHOCYTES NFR BLD AUTO: 35.9 %
MCH RBC QN AUTO: 30.5 PG (ref 26.5–33)
MCHC RBC AUTO-ENTMCNC: 33.4 G/DL (ref 31.5–36.5)
MCV RBC AUTO: 91 FL (ref 78–100)
MONOCYTES # BLD AUTO: 0.5 10E9/L (ref 0–1.3)
MONOCYTES NFR BLD AUTO: 8.4 %
NEUTROPHILS # BLD AUTO: 3.3 10E9/L (ref 1.6–8.3)
NEUTROPHILS NFR BLD AUTO: 52 %
PLATELET # BLD AUTO: 262 10E9/L (ref 150–450)
RBC # BLD AUTO: 4.49 10E12/L (ref 3.8–5.2)
WBC # BLD AUTO: 6.4 10E9/L (ref 4–11)

## 2018-12-17 PROCEDURE — 85025 COMPLETE CBC W/AUTO DIFF WBC: CPT | Performed by: PHYSICIAN ASSISTANT

## 2018-12-17 PROCEDURE — 85379 FIBRIN DEGRADATION QUANT: CPT | Performed by: PHYSICIAN ASSISTANT

## 2018-12-17 PROCEDURE — 99214 OFFICE O/P EST MOD 30 MIN: CPT | Performed by: PHYSICIAN ASSISTANT

## 2018-12-17 PROCEDURE — 93971 EXTREMITY STUDY: CPT | Mod: LT

## 2018-12-17 PROCEDURE — 36415 COLL VENOUS BLD VENIPUNCTURE: CPT | Performed by: PHYSICIAN ASSISTANT

## 2018-12-17 ASSESSMENT — ANXIETY QUESTIONNAIRES
5. BEING SO RESTLESS THAT IT IS HARD TO SIT STILL: NOT AT ALL
6. BECOMING EASILY ANNOYED OR IRRITABLE: NOT AT ALL
2. NOT BEING ABLE TO STOP OR CONTROL WORRYING: NOT AT ALL
3. WORRYING TOO MUCH ABOUT DIFFERENT THINGS: SEVERAL DAYS
GAD7 TOTAL SCORE: 1
IF YOU CHECKED OFF ANY PROBLEMS ON THIS QUESTIONNAIRE, HOW DIFFICULT HAVE THESE PROBLEMS MADE IT FOR YOU TO DO YOUR WORK, TAKE CARE OF THINGS AT HOME, OR GET ALONG WITH OTHER PEOPLE: NOT DIFFICULT AT ALL
1. FEELING NERVOUS, ANXIOUS, OR ON EDGE: NOT AT ALL
7. FEELING AFRAID AS IF SOMETHING AWFUL MIGHT HAPPEN: NOT AT ALL

## 2018-12-17 ASSESSMENT — PATIENT HEALTH QUESTIONNAIRE - PHQ9
SUM OF ALL RESPONSES TO PHQ QUESTIONS 1-9: 8
5. POOR APPETITE OR OVEREATING: NOT AT ALL

## 2018-12-17 ASSESSMENT — PAIN SCALES - GENERAL: PAINLEVEL: MILD PAIN (3)

## 2018-12-17 NOTE — NURSING NOTE
Patient states has had a blood clot before, and yesterday started having a pain in lower left leg and is concerned is a blood clot.  Chesli Shaikh LPN .............12/17/2018     8:21 AM    No LMP recorded. Patient is postmenopausal.  Medication Reconciliation: complete    Chelsi Shaikh LPN  12/17/2018 8:27 AM

## 2018-12-17 NOTE — PATIENT INSTRUCTIONS
Encouraged to take ibuprofen (400-800mg) for relief up to 4 times per day.  Encouraged rest and elevation.  Encouraged to use ice or heat 15 minutes at a time several times per day to decrease pain. Return to clinic in 1-2 weeks as necessary for persistent pain. Return to clinic with any change or worsening of symptoms.   Encouraged to complete ankle ABCs. Encouraged to stretch lower leg.   Can wrap leg with an ace wrap or use compression stockings as needed for comfort.

## 2018-12-17 NOTE — PROGRESS NOTES
Nursing Notes:   Chelsi Shaikh LPN  12/17/2018  8:40 AM  Signed  Patient states has had a blood clot before, and yesterday started having a pain in lower left leg and is concerned is a blood clot.  Chelsi Shaikh LPN .............12/17/2018     8:21 AM    No LMP recorded. Patient is postmenopausal.  Medication Reconciliation: complete    Chelsi Shaikh LPN  12/17/2018 8:27 AM      HPI:    Johanne Fish is a 48 year old female who presents for left lower leg pain. Hx blood clot 8 years ago.  Concerned that she has another blood clot.  Previous blood clot was in the similar area.  Previously has felt occasional twinges off and on in this area.  This time she is more concerned about the increased persistent pain.  Pain started yesterday morning. Left posterior mid calf.  Feels like a kink in there.  No recent injury.  Trying to stretch it.  Achy down to foot last night.  No swelling, bruising. Normally have swelling in her left foot - stable from previous.  No erythema, bruising, warmth.  No recent car rides or plane trips.  Patient did increase her fluoxetine from 20 mg to 30 mg on 12/5/2018.  No other medication changes.    Past Medical History:   Diagnosis Date     Dysplasia of cervix uteri     2001,abnormal Paps, treated by loop excision.     Lichen sclerosus et atrophicus     2005,external genital biopsy     Low back pain     Intermittent     Other premature depolarization     No Comments Provided     Phlebitis and thrombophlebitis of other deep vessels of unspecified lower extremity (H)     1/23/2010       Past Surgical History:   Procedure Laterality Date     BIOPSY BREAST  01/1997    benign disease.     BIOPSY BREAST  2012    Benign  Stereotactic R breast Bx [Other][     CONIZATION LEEP      9/2001,Loop     LAPAROSCOPIC ABLATION ENDOMETRIOSIS      07/20/2010     TONSILLECTOMY, ADENOIDECTOMY, COMBINED      No Comments Provided       Family History   Problem Relation Age of Onset     Heart Disease  Father         Heart Disease,Congestive heart failure     Family History Negative Mother         Good Health     Other - See Comments Other         12 half-siblings, no history of chronic illnesses.     Breast Cancer Maternal Grandmother         Cancer-breast     Ovarian Cancer Sister 50        Cancer-ovarian,ovarian cancer     Other - See Comments Sister          hysterectomy for bleeding.       Social History     Socioeconomic History     Marital status: Single     Spouse name: Not on file     Number of children: Not on file     Years of education: Not on file     Highest education level: Not on file   Social Needs     Financial resource strain: Not on file     Food insecurity - worry: Not on file     Food insecurity - inability: Not on file     Transportation needs - medical: Not on file     Transportation needs - non-medical: Not on file   Occupational History     Not on file   Tobacco Use     Smoking status: Former Smoker     Packs/day: 1.00     Years: 10.00     Pack years: 10.00     Types: Cigarettes     Last attempt to quit: 2006     Years since quittin.9     Smokeless tobacco: Never Used   Substance and Sexual Activity     Alcohol use: Yes     Alcohol/week: 1.5 oz     Comment: Alcoholic Drinks/day: occasionally, weekends     Drug use: No     Comment: Drug use: No     Sexual activity: Yes     Partners: Male   Other Topics Concern     Parent/sibling w/ CABG, MI or angioplasty before 65F 55M? Not Asked   Social History Narrative    Patient worked at [employer] as ; now working in the school district.  .  Lives in her own house with her younger child. Older child grown.    Tahir  Child - 91  Sean  Child - 94       Current Outpatient Medications   Medication Sig Dispense Refill     clobetasol (TEMOVATE) 0.05 % cream Apply to affected area twice daily for 2 weeks only       FLUoxetine (PROZAC) 10 MG capsule Take 1 capsule (10 mg) by mouth daily . Take in  combination with the 20 mg for a total of 30 mg. 90 capsule 3     FLUoxetine (PROZAC) 20 MG capsule Take 1 capsule (20 mg) by mouth every morning . Take in combination with the 10 mg for a total of 30 mg. 90 capsule 3       No Known Allergies    REVIEW OF SYSTEMS:  Refer to HPI.    EXAM:   Vitals:    /76 (BP Location: Right arm, Patient Position: Sitting, Cuff Size: Adult Regular)   Pulse 78   Temp 97.6  F (36.4  C) (Tympanic)   Resp 18   Wt 76.7 kg (169 lb)   SpO2 98%   Breastfeeding? No   BMI 27.70 kg/m      General Appearance: Pleasant, alert, appropriate appearance for age. No acute distress  Chest/Respiratory Exam: Normal chest wall and respirations. Clear to auscultation.  Cardiovascular Exam: Regular rate and rhythm. S1, S2, no murmur, click, gallop, or rubs.  Musculoskeletal Exam: Mild pain with palpation of the left posterior knee and left posterior proximal calf.  No swelling or bruising appreciated.  No erythema.  No hard cords appreciated.  No pain with left knee flexion and extension.  No lower extremity edema appreciated.  Skin: no rash or abnormalities  Neurologic Exam: Nonfocal, normal gross motor, tone coordination and no tremor.  Psychiatric Exam: Alert and oriented - appropriate affect.    PHQ Depression Screen  PHQ-9 SCORE 3/5/2018 12/5/2018 12/17/2018   PHQ-9 Total Score 1 4 8     Labs:  Results for orders placed or performed in visit on 12/17/18   D-Dimer,Quantitative   Result Value Ref Range    D-Dimer ng/mL <200 0 - 230 ng/ml D-DU   CBC and Differential   Result Value Ref Range    WBC 6.4 4.0 - 11.0 10e9/L    RBC Count 4.49 3.8 - 5.2 10e12/L    Hemoglobin 13.7 11.7 - 15.7 g/dL    Hematocrit 41.0 35.0 - 47.0 %    MCV 91 78 - 100 fl    MCH 30.5 26.5 - 33.0 pg    MCHC 33.4 31.5 - 36.5 g/dL    RDW 13.0 10.0 - 15.0 %    Platelet Count 262 150 - 450 10e9/L    Diff Method Automated Method     % Neutrophils 52.0 %    % Lymphocytes 35.9 %    % Monocytes 8.4 %    % Eosinophils 2.3 %    %  Basophils 1.1 %    % Immature Granulocytes 0.3 %    Absolute Neutrophil 3.3 1.6 - 8.3 10e9/L    Absolute Lymphocytes 2.3 0.8 - 5.3 10e9/L    Absolute Monocytes 0.5 0.0 - 1.3 10e9/L    Absolute Eosinophils 0.2 0.0 - 0.7 10e9/L    Absolute Basophils 0.1 0.0 - 0.2 10e9/L    Abs Immature Granulocytes 0.0 0 - 0.4 10e9/L         ASSESSMENT AND PLAN:    1. Strain of calf muscle, left, initial encounter    2. Pain of left calf          Completed left lower extremity ultrasound to rule out concerns.  Result was negative.  Patient also had a normal CBC and d-dimer.    No acute blood clot concerns at this time.  Symptoms likely due to a muscle strain.    Left lower extremity muscle strength:  Encouraged to take ibuprofen (400-800mg) for relief up to 4 times per day.  Encouraged rest and elevation.  Encouraged to use ice or heat 15 minutes at a time several times per day to decrease pain. Return to clinic in 1-2 weeks as necessary for persistent pain. Return to clinic with any change or worsening of symptoms.   Encouraged to complete ankle ABCs. Encouraged to stretch lower leg.   Can wrap leg with an ace wrap or use compression stockings as needed for comfort.        Patient Instructions   Encouraged to take ibuprofen (400-800mg) for relief up to 4 times per day.  Encouraged rest and elevation.  Encouraged to use ice or heat 15 minutes at a time several times per day to decrease pain. Return to clinic in 1-2 weeks as necessary for persistent pain. Return to clinic with any change or worsening of symptoms.   Encouraged to complete ankle ABCs. Encouraged to stretch lower leg.   Can wrap leg with an ace wrap or use compression stockings as needed for comfort.        Greater than 25 minutes were spent in counseling and coordination of care.     Johanne Welch PA-C..................12/17/2018 8:41 AM

## 2018-12-18 ASSESSMENT — ANXIETY QUESTIONNAIRES: GAD7 TOTAL SCORE: 1

## 2019-10-15 ENCOUNTER — HOSPITAL ENCOUNTER (OUTPATIENT)
Facility: OTHER | Age: 49
End: 2019-10-15
Attending: SURGERY | Admitting: SURGERY
Payer: COMMERCIAL

## 2019-10-15 ENCOUNTER — OFFICE VISIT (OUTPATIENT)
Dept: FAMILY MEDICINE | Facility: OTHER | Age: 49
End: 2019-10-15
Attending: PHYSICIAN ASSISTANT
Payer: COMMERCIAL

## 2019-10-15 VITALS
SYSTOLIC BLOOD PRESSURE: 110 MMHG | HEIGHT: 65 IN | WEIGHT: 164 LBS | RESPIRATION RATE: 16 BRPM | BODY MASS INDEX: 27.32 KG/M2 | TEMPERATURE: 98 F | HEART RATE: 62 BPM | DIASTOLIC BLOOD PRESSURE: 62 MMHG

## 2019-10-15 DIAGNOSIS — Z13.220 SCREENING CHOLESTEROL LEVEL: ICD-10-CM

## 2019-10-15 DIAGNOSIS — Z00.00 ROUTINE HISTORY AND PHYSICAL EXAMINATION OF ADULT: Primary | ICD-10-CM

## 2019-10-15 DIAGNOSIS — E04.1 THYROID CYST: ICD-10-CM

## 2019-10-15 DIAGNOSIS — Z12.11 SPECIAL SCREENING FOR MALIGNANT NEOPLASMS, COLON: ICD-10-CM

## 2019-10-15 DIAGNOSIS — F33.0 MAJOR DEPRESSIVE DISORDER, RECURRENT EPISODE, MILD (H): ICD-10-CM

## 2019-10-15 DIAGNOSIS — Z12.11 SPECIAL SCREENING FOR MALIGNANT NEOPLASMS, COLON: Primary | ICD-10-CM

## 2019-10-15 DIAGNOSIS — Z12.31 VISIT FOR SCREENING MAMMOGRAM: ICD-10-CM

## 2019-10-15 DIAGNOSIS — Z13.1 SCREENING FOR DIABETES MELLITUS: ICD-10-CM

## 2019-10-15 DIAGNOSIS — F41.1 ANXIETY, GENERALIZED: ICD-10-CM

## 2019-10-15 LAB
ANION GAP SERPL CALCULATED.3IONS-SCNC: 6 MMOL/L (ref 3–14)
BUN SERPL-MCNC: 22 MG/DL (ref 7–25)
CALCIUM SERPL-MCNC: 9 MG/DL (ref 8.6–10.3)
CHLORIDE SERPL-SCNC: 104 MMOL/L (ref 98–107)
CHOLEST SERPL-MCNC: 211 MG/DL
CO2 SERPL-SCNC: 29 MMOL/L (ref 21–31)
CREAT SERPL-MCNC: 0.86 MG/DL (ref 0.6–1.2)
GFR SERPL CREATININE-BSD FRML MDRD: 70 ML/MIN/{1.73_M2}
GLUCOSE SERPL-MCNC: 90 MG/DL (ref 70–105)
HDLC SERPL-MCNC: 65 MG/DL (ref 23–92)
LDLC SERPL CALC-MCNC: 130 MG/DL
NONHDLC SERPL-MCNC: 146 MG/DL
POTASSIUM SERPL-SCNC: 4.4 MMOL/L (ref 3.5–5.1)
SODIUM SERPL-SCNC: 139 MMOL/L (ref 134–144)
TRIGL SERPL-MCNC: 81 MG/DL
TSH SERPL DL<=0.05 MIU/L-ACNC: 1.53 IU/ML (ref 0.34–5.6)

## 2019-10-15 PROCEDURE — 80061 LIPID PANEL: CPT | Mod: ZL | Performed by: PHYSICIAN ASSISTANT

## 2019-10-15 PROCEDURE — 99396 PREV VISIT EST AGE 40-64: CPT | Performed by: PHYSICIAN ASSISTANT

## 2019-10-15 PROCEDURE — 80048 BASIC METABOLIC PNL TOTAL CA: CPT | Mod: ZL | Performed by: PHYSICIAN ASSISTANT

## 2019-10-15 PROCEDURE — 84443 ASSAY THYROID STIM HORMONE: CPT | Mod: ZL | Performed by: PHYSICIAN ASSISTANT

## 2019-10-15 PROCEDURE — 36415 COLL VENOUS BLD VENIPUNCTURE: CPT | Mod: ZL | Performed by: PHYSICIAN ASSISTANT

## 2019-10-15 RX ORDER — FLUOXETINE 10 MG/1
10 CAPSULE ORAL DAILY
Qty: 90 CAPSULE | Refills: 3 | Status: SHIPPED | OUTPATIENT
Start: 2019-10-15 | End: 2021-02-22 | Stop reason: DRUGHIGH

## 2019-10-15 RX ORDER — FLUOXETINE 10 MG/1
10 CAPSULE ORAL DAILY
Qty: 90 CAPSULE | Refills: 3 | Status: SHIPPED | OUTPATIENT
Start: 2019-10-15 | End: 2019-10-15

## 2019-10-15 ASSESSMENT — PATIENT HEALTH QUESTIONNAIRE - PHQ9: SUM OF ALL RESPONSES TO PHQ QUESTIONS 1-9: 0

## 2019-10-15 ASSESSMENT — MIFFLIN-ST. JEOR: SCORE: 1351.84

## 2019-10-15 NOTE — PATIENT INSTRUCTIONS
"Healthy Strategies  1. Eat at least 3 meals a day and never skip breakfast.  2. Eat more slowly.  3. Decrease portion size.  4. Provide structure by using meal replacement bars or shakes, and/or low calorie frozen meals.  5. For good nutrition incorporate fruit, vegetables, whole grains, lean protein, and low-fat dairy.  6. Remove trigger foods from yourenvironment to avoid impulse eating.  7. Increase physical activity: get a pedometer and aim for 10,000 steps a day or 30-35 minutes of activity 5 days per week.  8. Weigh yourself daily or at least weekly.  9. Keep a record of what you eat and your activity.  10. Establish a support system such as afriend, group or program.    11. Read Froy Castro's \"Eat to Live\". Remember it is important to have a minimum of 1200 calories a day, okay to use olive oil, 40 grams of fiber daily. No more than two servings (the size of your palm) of red meat a week.     Please consider the following general health recommendations:    Schedule a mammogram annually starting at the age of 40 years old unless recommended earlier by your primary care provider. Come for a general exam once yearly.    Eat a quality diet (generally, low in simple sugars, starches, cholesterol and saturated fat.)    Please get 1500 mg of calcium in divided doses with 1500 units vitamin D in your diet daily. Take supplements as needed to obtain full recommended amounts.     Stay physically active. Regular walking or other exercise is one of the best ways to minimize pain of arthritis; maintain independence and mobility; maintain bone strength; maintain conditioning of your heart. Find something you enjoy and a friend to do it with you.    Maintain ideal weight. Your Body mass index is Body mass index is 27.72 kg/m .. Generally a BMI of 20-25 is considered ideal. Overweight is defined as 25-30, obese is 30-35 and markedly obese is greater than 35.    Apply sun block (SPF 25 or greater) on exposed skin anytime you " are out in the sun to prevent skin cancer.     Wear a seatbelt whenever you are in a car.    Colonoscopy (an exam of the colon) is recommended every 10 years after the age of 50 to screen for colon cancer. (More often if you are at increased risk.)    Obtain a flu shot every fall.    You should have a tetanus booster at least once every 10 years.    A vaccine to reduce your chances of getting shingles is available if you are over 50. Information about the vaccine is available through the clinic or at:  (https://www.cdc.gov/vaccines/vpd/shingles/public/shingrix/index.html)    Check blood sugar annually. Cholesterol annually unless you have had a normal level when last checked within 5 years.     I recommend that you have a general physical exam every year. You should have a pap test every 3 years between the ages of 21 and 30 and every 3-5 years between the ages of 30 and 65 depending on your test unless you have had previous abnormal pap smears, (in these cases the exams and PAP's should be done on a schedule as recommended by your primary care provider). If you have had hysterectomy in the past, your future Pap plan may be different.

## 2019-10-15 NOTE — PROGRESS NOTES
Nursing Notes:   Ann Marie Martin LPN  10/15/2019  8:38 AM  Signed  Chief Complaint   Patient presents with     Physical         Medication Reconciliation: complete    Ann Marie Martin LPN      ANNUAL PHYSICAL - FEMALE    HPI: Johanne Fish who presents for a yearly exam.  Concerns include: Her dentist noticed an abnormal spot on the of her mouth.  Her dentist referred her to an oral surgeon to have the area biopsied.  Appointment is in [place] tomorrow.  She noticed the lesion last year.  No pain, sore throat, fevers, chills.  Otherwise feeling fine.  No known trauma.    No LMP recorded. Patient is postmenopausal.   Contraception: postmenopausal  Risk for STI?: no  Last pap: 3/5/2018 - nl pap and HPV, repeat in 5 years  Any hx of abnormal paps:  Many years ago, approx 20 years ago - normal since then  FH of early CA?: no  Cholesterol/DM concerns/screening: need rechecked  Tobacco?: no  Calcium intake: some  DEXA: na  Last mammo: 3/14/2018, needs repeat  Colonoscopy: need one  Immunizations: declines shingrix today and flu shot. Will get flu shot next week at work.     Patient Active Problem List    Diagnosis Date Noted     Abnormal thyroid ultrasound 03/05/2018     Priority: Medium     History of cervical dysplasia 01/18/2018     Priority: Medium     Other lichen, not elsewhere classified 01/18/2018     Priority: Medium     Major depressive disorder, recurrent episode, mild (H) 01/18/2018     Priority: Medium     Other premature beats 01/18/2018     Priority: Medium     Overview:   short KS interval, cardiac workup negative.  Observation recommended by Dr Fletcher.       Thyroid cyst 09/17/2014     Priority: Medium     Anxiety, generalized 11/01/2012     Priority: Medium     Phlebitis and thrombophlebitis of other deep vessels of lower extremities 01/23/2010     Priority: Medium     Overview:   While on OCP's         Past Medical History:   Diagnosis Date     Dysplasia of cervix uteri      ,abnormal Paps, treated by loop excision.     Lichen sclerosus et atrophicus     2005,external genital biopsy     Low back pain     Intermittent     Other premature depolarization     No Comments Provided     Phlebitis and thrombophlebitis of other deep vessels of unspecified lower extremity (H)     2010       Past Surgical History:   Procedure Laterality Date     BIOPSY BREAST  1997    benign disease.     BIOPSY BREAST  2012    Benign  Stereotactic R breast Bx [Other][     CONIZATION LEEP      2001,Loop     LAPAROSCOPIC ABLATION ENDOMETRIOSIS      2010     TONSILLECTOMY, ADENOIDECTOMY, COMBINED      No Comments Provided       Family History   Problem Relation Age of Onset     Heart Disease Father         Heart Disease,Congestive heart failure     Family History Negative Mother         Good Health     Other - See Comments Other         12 half-siblings, no history of chronic illnesses.     Breast Cancer Maternal Grandmother         Cancer-breast     Ovarian Cancer Sister 50        Cancer-ovarian,ovarian cancer     Other - See Comments Sister          hysterectomy for bleeding.       Social History     Tobacco Use     Smoking status: Former Smoker     Packs/day: 1.00     Years: 10.00     Pack years: 10.00     Types: Cigarettes     Last attempt to quit: 2006     Years since quittin.7     Smokeless tobacco: Never Used   Substance Use Topics     Alcohol use: Yes     Alcohol/week: 2.5 standard drinks     Comment: Alcoholic Drinks/day: occasionally, weekends       Current Outpatient Medications   Medication Sig Dispense Refill     clobetasol (TEMOVATE) 0.05 % cream Apply to affected area twice daily for 2 weeks only       FLUoxetine (PROZAC) 10 MG capsule Take 1 capsule (10 mg) by mouth daily . Take in combination with the 20 mg for a total of 30 mg. 90 capsule 3     FLUoxetine (PROZAC) 20 MG capsule Take 1 capsule (20 mg) by mouth every morning . Take in combination with the 10 mg for a total  "of 30 mg. 90 capsule 3       No Known Allergies    REVIEW OF SYSTEMS:  Refer to HPI.    PHYSICAL EXAM:  /62 (BP Location: Right arm, Patient Position: Sitting, Cuff Size: Adult Regular)   Pulse 62   Temp 98  F (36.7  C)   Resp 16   Ht 1.638 m (5' 4.5\")   Wt 74.4 kg (164 lb)   BMI 27.72 kg/m    CONSTITUTIONAL:  Alert,cooperative, NAD.  EYES: No scleral icterus.  PERRLA.  Conjunctiva clear.  ENT/MOUTH: External ears and nose normal.  TMs normal.  Moist mucous membranes. Oropharynx clear.    ENDO: No thyromegaly or thyroidnodules.  LYMPH:  No cervical or supraclavicular LA.    BREASTS: No skin abnormalities, no erythema.  No discrete masses.  No nipple discharge, no axillary, supra- or infraclavicular LA.   CARDIOVASCULAR: Regular,S1, S2.  No S3 or S4.  No murmur/gallop/rub.  No peripheral edema.  RESPIRATORY: CTA bilaterally, no wheezes, rhonchi or rales.  GI: Bowel sounds wnl.  Soft, nontender, nondistended.  No masses or HSM.  No rebound or guarding.  : Declined exam.  Pap smear obtained: no  MSKEL: Grossly normal ROM.  No clubbing.  INTEGUMENTARY:  Warm, dry.  No rash noted on exposed skin.  NEUROLOGIC: Facies symmetric.  Grossly normal movement and tone.  No tremor.  PSYCHIATRIC: Affect normal.  Speech fluent.      PHQ Depression Screen  PHQ-9 SCORE 12/5/2018 12/17/2018 10/15/2019   PHQ-9 Total Score 4 8 0         ASSESSMENT AND PLAN:      ICD-10-CM    1. Routine history and physical examination of adult Z00.00    2. Visit for screening mammogram Z12.31 MA Screening Digital Bilateral   3. Anxiety, generalized F41.1 FLUoxetine (PROZAC) 10 MG capsule     FLUoxetine (PROZAC) 20 MG capsule     DISCONTINUED: FLUoxetine (PROZAC) 20 MG capsule     DISCONTINUED: FLUoxetine (PROZAC) 10 MG capsule   4. Major depressive disorder, recurrent episode, mild (H) F33.0 FLUoxetine (PROZAC) 10 MG capsule     FLUoxetine (PROZAC) 20 MG capsule     DISCONTINUED: FLUoxetine (PROZAC) 20 MG capsule     DISCONTINUED: " "FLUoxetine (PROZAC) 10 MG capsule   5. Screening cholesterol level Z13.220 Basic Metabolic Panel     Basic Metabolic Panel   6. Screening for diabetes mellitus Z13.1 Lipid Panel     Lipid Panel   7. Thyroid cyst E04.1 TSH Reflex GH     TSH Reflex GH   8. Special screening for malignant neoplasms, colon Z12.11 GASTROENTEROLOGY ADULT REF PROCEDURE ONLY [facility] [phone]         Order mammogram for breast cancer screening.    Depression: Refilled fluoxetine.  No acute concerns at this time.  Encourage good diet and exercise.  Encouraged to see a counselor if preferred.  Return if symptoms are changing or worsening.  Recheck in 1 year for monitoring.    Completed BMP and lipid panel for cholesterol diabetes mellitus screening.  Completed a TSH with history of thyroid cyst for thyroid disease screening.    Order colonoscopy for colon cancer screening.      Recheck in 1 year for repeat physical.    Relevant cancer screening discussed.    Counseled on healthy diet, Calcium and vitamin D intake, and exercise.    Patient Instructions   Healthy Strategies  1. Eat at least 3 meals a day and never skip breakfast.  2. Eat more slowly.  3. Decrease portion size.  4. Provide structure by using meal replacement bars or shakes, and/or low calorie frozen meals.  5. For good nutrition incorporate fruit, vegetables, whole grains, lean protein, and low-fat dairy.  6. Remove trigger foods from yourenvironment to avoid impulse eating.  7. Increase physical activity: get a pedometer and aim for 10,000 steps a day or 30-35 minutes of activity 5 days per week.  8. Weigh yourself daily or at least weekly.  9. Keep a record of what you eat and your activity.  10. Establish a support system such as afriend, group or program.    11. Read Froy Castro's \"Eat to Live\". Remember it is important to have a minimum of 1200 calories a day, okay to use olive oil, 40 grams of fiber daily. No more than two servings (the size of " your palm) of red meat a week.     Please consider the following general health recommendations:    Schedule a mammogram annually starting at the age of 40 years old unless recommended earlier by your primary care provider. Come for a general exam once yearly.    Eat a quality diet (generally, low in simple sugars, starches, cholesterol and saturated fat.)    Please get 1500 mg of calcium in divided doses with 1500 units vitamin D in your diet daily. Take supplements as needed to obtain full recommended amounts.     Stay physically active. Regular walking or other exercise is one of the best ways to minimize pain of arthritis; maintain independence and mobility; maintain bone strength; maintain conditioning of your heart. Find something you enjoy and a friend to do it with you.    Maintain ideal weight. Your Body mass index is Body mass index is 27.72 kg/m .. Generally a BMI of 20-25 is considered ideal. Overweight is defined as 25-30, obese is 30-35 and markedly obese is greater than 35.    Apply sun block (SPF 25 or greater) on exposed skin anytime you are out in the sun to prevent skin cancer.     Wear a seatbelt whenever you are in a car.    Colonoscopy (an exam of the colon) is recommended every 10 years after the age of 50 to screen for colon cancer. (More often if you are at increased risk.)    Obtain a flu shot every fall.    You should have a tetanus booster at least once every 10 years.    A vaccine to reduce your chances of getting shingles is available if you are over 50. Information about the vaccine is available through the clinic or at:  (https://www.cdc.gov/vaccines/vpd/shingles/public/shingrix/index.html)    Check blood sugar annually. Cholesterol annually unless you have had a normal level when last checked within 5 years.     I recommend that you have a general physical exam every year. You should have a pap test every 3 years between the ages of 21 and 30 and every 3-5 years between the ages of  30 and 65 depending on your test unless you have had previous abnormal pap smears, (in these cases the exams and PAP's should be done on a schedule as recommended by your primary care provider). If you have had hysterectomy in the past, your future Pap plan may be different.            Johanne Welch PA-C PA-C..................10/15/2019 8:33 AM

## 2019-10-15 NOTE — TELEPHONE ENCOUNTER
Screening Questions for the Scheduling of Screening Colonoscopies   (If Colonoscopy is diagnostic, Provider should review the chart before scheduling.)  Are you younger than 50 or older than 80?  NO   Do you take aspirin or fish oil?  NO  (if yes, tell patient to stop 1 week prior to Colonoscopy)  Do you take warfarin (Coumadin), clopidogrel (Plavix), apixaban (Eliquis), dabigatram (Pradaxa), rivaroxaban (Xarelto) or any blood thinner? NO   Do you use oxygen at home?  NO   Do you have kidney disease? NO   Are you on dialysis? NO   Have you had a stroke or heart attack in the last year? NO   Have you had a stent in your heart or any blood vessel in the last year? NO   Have you had a transplant of any organ? NO   Have you had a colonoscopy or upper endoscopy (EGD) before? NO          When?    Date of scheduled Colonoscopy. 11/08/2019  Provider [facility]   Pharmacy [facility]

## 2019-10-15 NOTE — NURSING NOTE
Chief Complaint   Patient presents with     Physical         Medication Reconciliation: complete    Ann Marie Martin, LPN

## 2019-10-16 RX ORDER — POLYETHYLENE GLYCOL 3350, SODIUM CHLORIDE, SODIUM BICARBONATE, POTASSIUM CHLORIDE 420; 11.2; 5.72; 1.48 G/4L; G/4L; G/4L; G/4L
4000 POWDER, FOR SOLUTION ORAL ONCE
Qty: 4000 ML | Refills: 0 | Status: SHIPPED | OUTPATIENT
Start: 2019-10-16 | End: 2019-10-16

## 2019-10-16 RX ORDER — BISACODYL 5 MG
TABLET, DELAYED RELEASE (ENTERIC COATED) ORAL
Qty: 2 TABLET | Refills: 0 | Status: ON HOLD | OUTPATIENT
Start: 2019-10-16 | End: 2020-09-28

## 2020-08-05 ENCOUNTER — TELEPHONE (OUTPATIENT)
Dept: SURGERY | Facility: OTHER | Age: 50
End: 2020-08-05

## 2020-08-05 DIAGNOSIS — Z01.818 PRE-OP TESTING: Primary | ICD-10-CM

## 2020-08-05 NOTE — TELEPHONE ENCOUNTER
Screening Questions for the Scheduling of Screening Colonoscopies   (If Colonoscopy is diagnostic, Provider should review the chart before scheduling.)  Are you younger than 50 or older than 80?  NO   Do you take aspirin or fish oil?  YES - BOTH  (if yes, tell patient to stop 1 week prior to Colonoscopy)  Do you take warfarin (Coumadin), clopidogrel (Plavix), apixaban (Eliquis), dabigatram (Pradaxa), rivaroxaban (Xarelto) or any blood thinner? NO   Do you use oxygen at home?  NO   Do you have kidney disease? NO   Are you on dialysis? NO   Have you had a stroke or heart attack in the last year? NO   Have you had a stent in your heart or any blood vessel in the last year? NO   Have you had a transplant of any organ? NO   Have you had a colonoscopy or upper endoscopy (EGD) before? NO          When?    Date of scheduled Colonoscopy. 09/28/2020  Provider MIKE Burrell ALREADY HAS PREP - HAD BEEN SCHEDULED BACK IN NOV

## 2020-08-19 ENCOUNTER — HOSPITAL ENCOUNTER (OUTPATIENT)
Dept: MAMMOGRAPHY | Facility: OTHER | Age: 50
Discharge: HOME | End: 2020-08-19
Attending: FAMILY MEDICINE | Admitting: FAMILY MEDICINE
Payer: COMMERCIAL

## 2020-08-19 DIAGNOSIS — Z12.31 VISIT FOR SCREENING MAMMOGRAM: ICD-10-CM

## 2020-08-19 PROCEDURE — 77063 BREAST TOMOSYNTHESIS BI: CPT

## 2020-09-22 ENCOUNTER — MYC MEDICAL ADVICE (OUTPATIENT)
Dept: FAMILY MEDICINE | Facility: OTHER | Age: 50
End: 2020-09-22

## 2020-09-22 PROBLEM — Z00.00 HEALTHCARE MAINTENANCE: Status: ACTIVE | Noted: 2020-09-22

## 2020-09-25 ENCOUNTER — ALLIED HEALTH/NURSE VISIT (OUTPATIENT)
Dept: FAMILY MEDICINE | Facility: OTHER | Age: 50
End: 2020-09-25
Attending: SURGERY
Payer: COMMERCIAL

## 2020-09-25 DIAGNOSIS — Z01.818 PRE-OP TESTING: ICD-10-CM

## 2020-09-25 DIAGNOSIS — Z12.31 VISIT FOR SCREENING MAMMOGRAM: ICD-10-CM

## 2020-09-25 DIAGNOSIS — Z20.822 ENCOUNTER FOR LABORATORY TESTING FOR COVID-19 VIRUS: Primary | ICD-10-CM

## 2020-09-25 PROCEDURE — 99207 ZZC NO CHARGE NURSE ONLY: CPT

## 2020-09-25 PROCEDURE — U0003 INFECTIOUS AGENT DETECTION BY NUCLEIC ACID (DNA OR RNA); SEVERE ACUTE RESPIRATORY SYNDROME CORONAVIRUS 2 (SARS-COV-2) (CORONAVIRUS DISEASE [COVID-19]), AMPLIFIED PROBE TECHNIQUE, MAKING USE OF HIGH THROUGHPUT TECHNOLOGIES AS DESCRIBED BY CMS-2020-01-R: HCPCS | Mod: ZL | Performed by: SURGERY

## 2020-09-25 PROCEDURE — C9803 HOPD COVID-19 SPEC COLLECT: HCPCS

## 2020-09-25 NOTE — PROGRESS NOTES
Patient swabbed for COVID-19 testing for surgery.  Ismael Fletcher LPN on 9/25/2020 at 8:10 AM

## 2020-09-26 ENCOUNTER — MYC MEDICAL ADVICE (OUTPATIENT)
Dept: FAMILY MEDICINE | Facility: OTHER | Age: 50
End: 2020-09-26

## 2020-09-26 LAB
LABORATORY COMMENT REPORT: NORMAL
SARS-COV-2 RNA SPEC QL NAA+PROBE: NEGATIVE
SARS-COV-2 RNA SPEC QL NAA+PROBE: NORMAL
SPECIMEN SOURCE: NORMAL
SPECIMEN SOURCE: NORMAL

## 2020-09-28 ENCOUNTER — ANESTHESIA EVENT (OUTPATIENT)
Dept: SURGERY | Facility: OTHER | Age: 50
End: 2020-09-28
Payer: COMMERCIAL

## 2020-09-28 ENCOUNTER — ANESTHESIA (OUTPATIENT)
Dept: SURGERY | Facility: OTHER | Age: 50
End: 2020-09-28
Payer: COMMERCIAL

## 2020-09-28 ENCOUNTER — HOSPITAL ENCOUNTER (OUTPATIENT)
Facility: OTHER | Age: 50
Discharge: HOME | End: 2020-09-28
Attending: SURGERY | Admitting: SURGERY
Payer: COMMERCIAL

## 2020-09-28 VITALS
WEIGHT: 175 LBS | HEART RATE: 76 BPM | TEMPERATURE: 98 F | RESPIRATION RATE: 12 BRPM | BODY MASS INDEX: 29.16 KG/M2 | HEIGHT: 65 IN | OXYGEN SATURATION: 97 % | SYSTOLIC BLOOD PRESSURE: 120 MMHG | DIASTOLIC BLOOD PRESSURE: 95 MMHG

## 2020-09-28 PROBLEM — K57.30 DIVERTICULOSIS OF COLON: Status: ACTIVE | Noted: 2020-09-28

## 2020-09-28 PROBLEM — K63.5 COLON POLYPS: Status: ACTIVE | Noted: 2020-09-28

## 2020-09-28 PROCEDURE — 88305 TISSUE EXAM BY PATHOLOGIST: CPT

## 2020-09-28 PROCEDURE — 45384 COLONOSCOPY W/LESION REMOVAL: CPT | Performed by: NURSE ANESTHETIST, CERTIFIED REGISTERED

## 2020-09-28 PROCEDURE — 25000125 ZZHC RX 250: Performed by: NURSE ANESTHETIST, CERTIFIED REGISTERED

## 2020-09-28 PROCEDURE — 25800030 ZZH RX IP 258 OP 636: Performed by: NURSE ANESTHETIST, CERTIFIED REGISTERED

## 2020-09-28 PROCEDURE — 45380 COLONOSCOPY AND BIOPSY: CPT | Performed by: SURGERY

## 2020-09-28 PROCEDURE — 25000125 ZZHC RX 250: Performed by: SURGERY

## 2020-09-28 PROCEDURE — 45384 COLONOSCOPY W/LESION REMOVAL: CPT | Mod: PT | Performed by: SURGERY

## 2020-09-28 PROCEDURE — 25800030 ZZH RX IP 258 OP 636: Performed by: SURGERY

## 2020-09-28 PROCEDURE — 40000010 ZZH STATISTIC ANES STAT CODE-CRNA PER MINUTE: Performed by: SURGERY

## 2020-09-28 PROCEDURE — 25000132 ZZH RX MED GY IP 250 OP 250 PS 637: Performed by: SURGERY

## 2020-09-28 RX ORDER — PROPOFOL 10 MG/ML
INJECTION, EMULSION INTRAVENOUS CONTINUOUS PRN
Status: DISCONTINUED | OUTPATIENT
Start: 2020-09-28 | End: 2020-09-28

## 2020-09-28 RX ORDER — LIDOCAINE 40 MG/G
CREAM TOPICAL
Status: DISCONTINUED | OUTPATIENT
Start: 2020-09-28 | End: 2020-09-28 | Stop reason: HOSPADM

## 2020-09-28 RX ORDER — ONDANSETRON 2 MG/ML
4 INJECTION INTRAMUSCULAR; INTRAVENOUS
Status: DISCONTINUED | OUTPATIENT
Start: 2020-09-28 | End: 2020-09-28 | Stop reason: HOSPADM

## 2020-09-28 RX ORDER — SODIUM CHLORIDE, SODIUM LACTATE, POTASSIUM CHLORIDE, CALCIUM CHLORIDE 600; 310; 30; 20 MG/100ML; MG/100ML; MG/100ML; MG/100ML
INJECTION, SOLUTION INTRAVENOUS CONTINUOUS PRN
Status: DISCONTINUED | OUTPATIENT
Start: 2020-09-28 | End: 2020-09-28

## 2020-09-28 RX ORDER — FLUMAZENIL 0.1 MG/ML
0.2 INJECTION, SOLUTION INTRAVENOUS
Status: DISCONTINUED | OUTPATIENT
Start: 2020-09-28 | End: 2020-09-28 | Stop reason: HOSPADM

## 2020-09-28 RX ORDER — SODIUM CHLORIDE, SODIUM LACTATE, POTASSIUM CHLORIDE, CALCIUM CHLORIDE 600; 310; 30; 20 MG/100ML; MG/100ML; MG/100ML; MG/100ML
INJECTION, SOLUTION INTRAVENOUS CONTINUOUS
Status: DISCONTINUED | OUTPATIENT
Start: 2020-09-28 | End: 2020-09-28 | Stop reason: HOSPADM

## 2020-09-28 RX ORDER — NALOXONE HYDROCHLORIDE 0.4 MG/ML
.1-.4 INJECTION, SOLUTION INTRAMUSCULAR; INTRAVENOUS; SUBCUTANEOUS
Status: DISCONTINUED | OUTPATIENT
Start: 2020-09-28 | End: 2020-09-28 | Stop reason: HOSPADM

## 2020-09-28 RX ORDER — NITROFURANTOIN 25; 75 MG/1; MG/1
1 CAPSULE ORAL 2 TIMES DAILY WITH MEALS
COMMUNITY
Start: 2020-09-22 | End: 2020-12-03

## 2020-09-28 RX ORDER — SIMETHICONE 40MG/0.6ML
SUSPENSION, DROPS(FINAL DOSAGE FORM)(ML) ORAL PRN
Status: DISCONTINUED | OUTPATIENT
Start: 2020-09-28 | End: 2020-09-28 | Stop reason: HOSPADM

## 2020-09-28 RX ADMIN — PROPOFOL 140 MCG/KG/MIN: 10 INJECTION, EMULSION INTRAVENOUS at 07:29

## 2020-09-28 RX ADMIN — SODIUM CHLORIDE, POTASSIUM CHLORIDE, SODIUM LACTATE AND CALCIUM CHLORIDE: 600; 310; 30; 20 INJECTION, SOLUTION INTRAVENOUS at 07:08

## 2020-09-28 RX ADMIN — SODIUM CHLORIDE, POTASSIUM CHLORIDE, SODIUM LACTATE AND CALCIUM CHLORIDE: 600; 310; 30; 20 INJECTION, SOLUTION INTRAVENOUS at 07:25

## 2020-09-28 SDOH — HEALTH STABILITY: MENTAL HEALTH: CURRENT SMOKER: 0

## 2020-09-28 ASSESSMENT — MIFFLIN-ST. JEOR: SCORE: 1404.67

## 2020-09-28 NOTE — DISCHARGE INSTRUCTIONS
[facility]  Adult Discharge Orders & Instructions    ________________________________________________________________          For 12 hours after surgery  1. Get plenty of rest.  A responsible adult must stay with you for at least 12 hours after you leave the hospital.   2. You may feel lightheaded.  IF so, sit for a few minutes before standing.  Have someone help you get up.   3. You may have a slight fever. Call the doctor if your fever is over 101 F (38.3 C) (taken under the tongue) or lasts longer than 24 hours.  4. You may have a dry mouth, a sore throat, muscle aches or trouble sleeping.  These should go away after 24 hours.  5. Do not make important or legal decisions.  6.   Do not drive or use heavy equipment.  If you have weakness or tingling, don't drive or use heavy equipment until this feeling goes away.    To contact a doctor, call   [phone]_______________________]

## 2020-09-28 NOTE — H&P
"History and Physical    CHIEF COMPLAINT / REASON FOR PROCEDURE:  Healthcare maintenance     PERTINENT HISTORY   Patient is a 50 year old female who presents today for colonoscopy for Healthcare maintenance .     Patient has no complaints.    Past Medical History:   Diagnosis Date     Dysplasia of cervix uteri     2001,abnormal Paps, treated by loop excision.     Lichen sclerosus et atrophicus     2005,external genital biopsy     Low back pain     Intermittent     Other premature depolarization     No Comments Provided     Phlebitis and thrombophlebitis of other deep vessels of unspecified lower extremity (H)     1/23/2010     Past Surgical History:   Procedure Laterality Date     BIOPSY BREAST  01/1997    benign disease.     BIOPSY BREAST  2012    Benign  Stereotactic R breast Bx [Other][     CONIZATION LEEP      9/2001,Loop     LAPAROSCOPIC ABLATION ENDOMETRIOSIS      07/20/2010     TONSILLECTOMY, ADENOIDECTOMY, COMBINED      No Comments Provided       Bleeding tendencies:  No    ALLERGIES/SENSITIVITIES: No Known Allergies     CURRENT MEDICATIONS:    Prior to Admission medications    Medication Sig Start Date End Date Taking? Authorizing Provider   FLUoxetine (PROZAC) 10 MG capsule Take 1 capsule (10 mg) by mouth daily . Take in combination with the 20 mg for a total of 30 mg. 10/15/19  Yes Johanne Welch PA-C   FLUoxetine (PROZAC) 20 MG capsule Take 1 capsule (20 mg) by mouth every morning . Take in combination with the 10 mg for a total of 30 mg. 10/15/19  Yes Johanne Welch PA-C   clobetasol (TEMOVATE) 0.05 % cream Apply to affected area twice daily for 2 weeks only 1/25/17   Reported, Patient   nitroFURantoin macrocrystal-monohydrate (MACROBID) 100 MG capsule Take 1 capsule by mouth 2 times daily (with meals) 9/22/20   Reported, Patient       Physical Exam:  /62   Temp 98.3  F (36.8  C) (Tympanic)   Resp 16   Ht 1.651 m (5' 5\")   Wt 79.4 kg (175 lb)   SpO2 97%   BMI 29.12 kg/m  "   EXAM:  Chest/Respiratory Exam: Normal - Clear to auscultation without rales, rhonchi, or wheezing.  Cardiovascular Exam: normal, regular rate and rhythm        PLAN: COLONOSCOPY .  Patient understands risks of bleeding, perforation, potential inability to reach cecum, aspiration and wishes to proceed.

## 2020-09-28 NOTE — ANESTHESIA POSTPROCEDURE EVALUATION
Patient: Johanne Fish    Procedure(s):  COLONOSCOPY, WITH POLYPECTOMY AND BIOPSY    Diagnosis:Screening for colon cancer [Z12.11]  Diagnosis Additional Information: No value filed.    Anesthesia Type:  MAC    Note:  Anesthesia Post Evaluation    Patient location during evaluation: Phase 2  Patient participation: Able to fully participate in evaluation  Level of consciousness: awake and alert  Pain management: adequate  Airway patency: patent  Cardiovascular status: acceptable  Respiratory status: acceptable  Hydration status: acceptable  PONV: none             Last vitals:  Vitals:    09/28/20 0803 09/28/20 0815 09/28/20 0830   BP: 93/69 (!) 119/103 (!) 120/95   Pulse:  81 76   Resp: 12 10 12   Temp: 96.5  F (35.8  C)  98  F (36.7  C)   SpO2: 97% 99% 97%         Electronically Signed By: ANNIKA ZARATE CRNA  September 28, 2020  9:09 AM

## 2020-09-28 NOTE — ANESTHESIA CARE TRANSFER NOTE
Patient: Johanne Fish    Procedure(s):  COLONOSCOPY, WITH POLYPECTOMY AND BIOPSY    Diagnosis: Screening for colon cancer [Z12.11]  Diagnosis Additional Information: No value filed.    Anesthesia Type:   MAC     Note:  Airway :Face Mask (Patient transported on O2@ 10L/min)  Patient transferred to:Phase II  Handoff Report: Identifed the Patient, Identified the Reponsible Provider, Reviewed the pertinent medical history, Discussed the surgical course, Reviewed Intra-OP anesthesia mangement and issues during anesthesia, Set expectations for post-procedure period and Allowed opportunity for questions and acknowledgement of understanding      Vitals: (Last set prior to Anesthesia Care Transfer)    CRNA VITALS  9/28/2020 0728 - 9/28/2020 0801      9/28/2020             Resp Rate (set):  10                Electronically Signed By: David Kellerman, APRN CRNA  September 28, 2020  8:01 AM

## 2020-09-28 NOTE — OR NURSING
Johanne Fish has been discharged to home at 0850 via W/C to front entrance of clinic accompanied by RN and friend Steph.    Written discharge instructions were provided to patient.      Patient and adult caring for them verbalize understanding of discharge instructions including no driving until tomorrow and no longer taking narcotic pain medications - no operating mechanical equipment and no making any important decisions.They understand reason for discharge, and necessary follow-up appointments.

## 2020-09-28 NOTE — OP NOTE
PROCEDURE NOTE    DATE OF SERVICE: 9/28/2020    SURGEON: Kenney Turner MD    PRE-OP DIAGNOSIS:    Healthcare maintenance       POST-OP DIAGNOSIS:  Same  ScatteredDiverticulosis  Polyps at 30 cm and 10 cm    PROCEDURE:   Colonoscopy with hot biopsy      ANESTHESIA:  MAC D Kellerman CRNA    INDICATION FOR THE PROCEDURE: The patient is a 50 year old female in need of Healthcare maintenance  . The patient has no other complaints  . After explaining the risks to include bleeding, perforation, potential inability toreach the cecum, the patient wished to proceed.    PROCEDURE:After adequate sedation, the patient was in the left lateral decubitus position.  Rectal exam was performed.  There was normal tone and no palpable masses .  The colonoscope was introduced into the rectum and advanced to the cecum with Mild difficulty.  The patient's prep was excellent.  The terminal cecum was reached.  The cecum, ascending, transverse, descending and sigmoid colon was with scattered diverticulosis. Diminutive polyps at 30 cm and 10 cm were hot biopsied and destroyed .  The scope was retroflexed in the rectum.  The rectum was unremarkable  .  The scope was straightened and removed.  The patient tolerated the procedure well.     ESTIMATED BLOOD LOSS: none    COMPLICATIONS:  None    TISSUE REMOVED:  Yes    RECOMMEND:      Follow-up pending pathology  Fiber  Given literature on diverticulosis    Kenney Turner MD FACS

## 2020-09-28 NOTE — ANESTHESIA PREPROCEDURE EVALUATION
Anesthesia Pre-Procedure Evaluation    Patient: Johanne Fish   MRN: [id number] : 1970          Preoperative Diagnosis: Screening for colon cancer [Z12.11]    Procedure(s):  COLONOSCOPY    Past Medical History:   Diagnosis Date     Dysplasia of cervix uteri     ,abnormal Paps, treated by loop excision.     Lichen sclerosus et atrophicus     ,external genital biopsy     Low back pain     Intermittent     Other premature depolarization     No Comments Provided     Phlebitis and thrombophlebitis of other deep vessels of unspecified lower extremity (H)     2010     Past Surgical History:   Procedure Laterality Date     BIOPSY BREAST  1997    benign disease.     BIOPSY BREAST      Benign  Stereotactic R breast Bx [Other][     CONIZATION LEEP      2001,Loop     LAPAROSCOPIC ABLATION ENDOMETRIOSIS      2010     TONSILLECTOMY, ADENOIDECTOMY, COMBINED      No Comments Provided       Anesthesia Evaluation     .             ROS/MED HX    ENT/Pulmonary:  - neg pulmonary ROS     Neurologic:  - neg neurologic ROS     Cardiovascular:  - neg cardiovascular ROS       METS/Exercise Tolerance:  >4 METS   Hematologic:  - neg hematologic  ROS       Musculoskeletal:  - neg musculoskeletal ROS       GI/Hepatic:     (+) bowel prep,       Renal/Genitourinary:  - ROS Renal section negative       Endo:     (+) thyroid problem  Thyroid disease - Other thyroid cyst, .      Psychiatric:     (+) psychiatric history depression      Infectious Disease:  - neg infectious disease ROS       Malignancy:      - no malignancy   Other:    - neg other ROS                      Physical Exam  Normal systems: pulmonary and dental    Airway   Mallampati: I  TM distance: >3 FB  Neck ROM: full    Dental     Cardiovascular   Rhythm and rate: regular and normal      Pulmonary             Lab Results   Component Value Date    WBC 6.4 2018    HGB 13.7 2018    HCT 41.0 2018     2018    SED 3  "01/08/2018     10/15/2019    POTASSIUM 4.4 10/15/2019    CHLORIDE 104 10/15/2019    CO2 29 10/15/2019    BUN 22 10/15/2019    CR 0.86 10/15/2019    GLC 90 10/15/2019    DANIELITO 9.0 10/15/2019    HCG Negative 09/04/2013       Preop Vitals  BP Readings from Last 3 Encounters:   10/15/19 110/62   12/17/18 120/76   12/05/18 92/60    Pulse Readings from Last 3 Encounters:   10/15/19 62   12/17/18 78   12/05/18 84      Resp Readings from Last 3 Encounters:   10/15/19 16   12/17/18 18   12/05/18 18    SpO2 Readings from Last 3 Encounters:   12/17/18 98%      Temp Readings from Last 1 Encounters:   10/15/19 98  F (36.7  C)    Ht Readings from Last 1 Encounters:   10/15/19 1.638 m (5' 4.5\")      Wt Readings from Last 1 Encounters:   10/15/19 74.4 kg (164 lb)    Estimated body mass index is 27.72 kg/m  as calculated from the following:    Height as of 10/15/19: 1.638 m (5' 4.5\").    Weight as of 10/15/19: 74.4 kg (164 lb).       Anesthesia Plan      History & Physical Review      ASA Status:  2 .    NPO Status:  > 6 hours    Plan for MAC with Propofol induction.       The patient is not a current smoker      Postoperative Care      Consents  Anesthetic plan, risks, benefits and alternatives discussed with:  Patient..                 ANNIKA ZARATE CRNA  "

## 2020-10-08 ENCOUNTER — TELEPHONE (OUTPATIENT)
Dept: SURGERY | Facility: OTHER | Age: 50
End: 2020-10-08

## 2020-10-08 NOTE — TELEPHONE ENCOUNTER
Reason for call: Request for results.    Name of test or procedure: Colonoscopy    Date of test or procedure: 09-    Location of test or procedure: [facility]    Preferred method for responding to this message: Telephone Call    Phone number patient can be reached at: Home number on file [phone] (home)    If we can't reach you directly, may we leave a detailed response at the number you provided?Yes

## 2020-10-09 PROBLEM — Z00.00 HEALTHCARE MAINTENANCE: Status: RESOLVED | Noted: 2020-09-22 | Resolved: 2020-10-09

## 2020-10-09 PROBLEM — K63.5 COLON POLYPS: Status: RESOLVED | Noted: 2020-09-28 | Resolved: 2020-10-09

## 2020-12-03 ENCOUNTER — VIRTUAL VISIT (OUTPATIENT)
Dept: FAMILY MEDICINE | Facility: OTHER | Age: 50
End: 2020-12-03
Attending: PHYSICIAN ASSISTANT
Payer: COMMERCIAL

## 2020-12-03 DIAGNOSIS — Z20.822 COVID-19 RULED OUT: ICD-10-CM

## 2020-12-03 DIAGNOSIS — Z20.822 COVID-19 RULED OUT: Primary | ICD-10-CM

## 2020-12-03 PROCEDURE — 99207 PR NO CHARGE NURSE ONLY: CPT

## 2020-12-03 PROCEDURE — 99212 OFFICE O/P EST SF 10 MIN: CPT | Mod: TEL | Performed by: PHYSICIAN ASSISTANT

## 2020-12-03 PROCEDURE — U0003 INFECTIOUS AGENT DETECTION BY NUCLEIC ACID (DNA OR RNA); SEVERE ACUTE RESPIRATORY SYNDROME CORONAVIRUS 2 (SARS-COV-2) (CORONAVIRUS DISEASE [COVID-19]), AMPLIFIED PROBE TECHNIQUE, MAKING USE OF HIGH THROUGHPUT TECHNOLOGIES AS DESCRIBED BY CMS-2020-01-R: HCPCS | Mod: ZL | Performed by: PHYSICIAN ASSISTANT

## 2020-12-03 PROCEDURE — C9803 HOPD COVID-19 SPEC COLLECT: HCPCS

## 2020-12-03 NOTE — PATIENT INSTRUCTIONS
"Discharge Instructions for COVID-19 Patients  You have--or may have--COVID-19. Please follow the instructions listed below.   If you have a weakened immune system, discuss with your doctor any other actions you need to take.  How can I protect others?  If you have symptoms (fever, cough, body aches or trouble breathing):    Stay home and away from others (self-isolate) until:  ? At least 10 days have passed since your symptoms started, And   ? You've had no fever--and no medicine that reduces fever--for 1 full day (24 hours), And    ? Your other symptoms have resolved (gotten better).  If you don't show symptoms, but testing showed that you have COVID-19:    Stay home and away from others (self-isolate). Follow the tips under \"How do I self-isolate?\" below for 10 days (20 days if you have a weak immune system).    You don't need to be retested for COVID-19 before going back to school or work. As long as you're fever-free and feeling better, you can go back to school, work and other activities after waiting the 10 or 20 days.   How do I self-isolate?    Stay in your own room, even for meals. Use your own bathroom if you can.    Stay away from others in your home. No hugging, kissing or shaking hands. No visitors.    Don't go to work, school or anywhere else.    Clean \"high touch\" surfaces often (doorknobs, counters, handles). Use household cleaning spray or wipes. You'll find a full list of  on the EPA website: www.epa.gov/pesticide-registration/list-n-disinfectants-use-against-sars-cov-2.    Cover your mouth and nose with a mask or other face covering to avoid spreading germs.    Wash your hands and face often. Use soap and water.    Caregivers in these groups are at risk for severe illness due to COVID-19:  ? People 65 years and older  ? People who live in a nursing home or long-term care facility  ? People with chronic disease (lung, heart, cancer, diabetes, kidney, liver, immunologic)  ? People who have a " weakened immune system, including those who:    Are in cancer treatment    Take medicine that weakens the immune system, such as corticosteroids    Had a bone marrow or organ transplant    Have an immune deficiency    Have poorly controlled HIV or AIDS    Are obese (body mass index of 40 or higher)    Smoke regularly    Caregivers should wear gloves while washing dishes, handling laundry and cleaning bedrooms and bathrooms.    Use caution when washing and drying laundry: Don't shake dirty laundry and use the warmest water setting that you can.    For more tips on managing your health at home, go to www.cdc.gov/coronavirus/2019-ncov/downloads/10Things.pdf.  How can I take care of myself at home?  1. Get lots of rest. Drink extra fluids (unless a doctor has told you not to).    2. Take Tylenol (acetaminophen) for fever or pain. If you have liver or kidney problems, ask your family doctor if it's okay to take Tylenol.     Adults can take either:  ? 650 mg (two 325 mg pills) every 4 to 6 hours, or   ? 1,000 mg (two 500 mg pills) every 8 hours as needed.  ? Note: Don't take more than 3,000 mg in one day. Acetaminophen is found in many medicines (both prescribed and over-the-counter medicines). Read all labels to be sure you don't take too much.   For children, check the Tylenol bottle for the right dose. The dose is based on the child's age or weight.  3. If you have other health problems (like cancer, heart failure, an organ transplant or severe kidney disease): Call your specialty clinic if you don't feel better in the next 2 days.    4. Know when to call 911. Emergency warning signs include:  ? Trouble breathing or shortness of breath  ? Pain or pressure in the chest that doesn't go away  ? Feeling confused like you haven't felt before, or not being able to wake up  ? Bluish-colored lips or face    5. Your doctor may have prescribed a blood thinner medicine. Follow their instructions.  Where can I get more  information?    [facility] - About COVID-19: [email or web address]    CDC - What to Do If You're Sick: www.cdc.gov/coronavirus/2019-ncov/about/steps-when-sick.html    CDC - Ending Home Isolation: www.cdc.gov/coronavirus/2019-ncov/hcp/disposition-in-home-patients.html    CDC - Caring for Someone: www.cdc.gov/coronavirus/2019-ncov/if-you-are-sick/care-for-someone.html    [facility] - Interim Guidance for Hospital Discharge to Home: www.health.[facility].[place]./diseases/coronavirus/hcp/hospdischarge.pdf    [facility] clinical trials (COVID-19 research studies): clinicalaffairs.[facility]-clinical-trials    Below are the COVID-19 hotlines at the [place] Department of [facility] ([facility]). Interpreters are available.  ? For health questions: Call 108-334-9012 or 1-584.483.7043 (7 a.m. to 7 p.m.)  ? For questions about schools and childcare: Call 913-115-2337 or 1-538.944.3103 (7 a.m. to 7 p.m.)    For informational purposes only. Not to replace the advice of your health care provider. Clinically reviewed by the Infection Prevention Team. Copyright   2020 [employer]. All rights reserved. [employer] [id number] - REV 08/04/20.

## 2020-12-03 NOTE — NURSING NOTE
Patient swabbed for COVID-19 testing for exposure.  Soheila Verdugo LPN on 12/3/2020 at 10:49 AM

## 2020-12-03 NOTE — NURSING NOTE
Patient was in contact with a coworker that tested positive. 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 Last contact was 1 week ago. Patient does have a runny nose.  Joanna Dumont LPN ....................  12/3/2020   8:24 AM

## 2020-12-03 NOTE — PROGRESS NOTES
"Johanne Fish is a 50 year old female who is being evaluated via a billable telephone visit.      The patient has been notified of following:     \"This telephone visit will be conducted via a call between you and your physician/provider. We have found that certain health care needs can be provided without the need for a physical exam.  This service lets us provide the care you need with a short phone conversation.  If a prescription is necessary we can send it directly to your pharmacy.  If lab work is needed we can place an order for that and you can then stop by our lab to have the test done at a later time.    Telephone visits are billed at different rates depending on your insurance coverage. During this emergency period, for some insurers they may be billed the same as an in-person visit.  Please reach out to your insurance provider with any questions.    If during the course of the call the physician/provider feels a telephone visit is not appropriate, you will not be charged for this service.\"    Patient has given verbal consent for Telephone visit?  Yes    What phone number would you like to be contacted at? [phone]    How would you like to obtain your AVS? Meka Dyson     Johanne Fish is a 50 year old female who presents via phone visit today for the following health issues:    HPI     Patient is contacted via telephone for consideration of COVID-19 testing. States she was exposed to positive individual at work. She has been in prolonged close contact with that individual while working over the past week. There are multiple other co-workers who are out due to family members who are also positive. 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 Patient is currently asymptomatic. Denies fever/chills, cough, sore throat, shortness of breath, wheezing, muscle or body aches, headaches, GI symptoms, rash, changes in taste or smell. "         PAST MEDICAL HISTORY:   Past Medical History:   Diagnosis Date     Dysplasia of cervix uteri     ,abnormal Paps, treated by loop excision.     Lichen sclerosus et atrophicus     ,external genital biopsy     Low back pain     Intermittent     Other premature depolarization     No Comments Provided     Phlebitis and thrombophlebitis of other deep vessels of unspecified lower extremity (H)     2010       PAST SURGICAL HISTORY:   Past Surgical History:   Procedure Laterality Date     BIOPSY BREAST  1997    benign disease.     BIOPSY BREAST      Benign  Stereotactic R breast Bx [Other][     COLONOSCOPY N/A 2020    F/U 2030 hyperplastic and scattered diverticulosis     CONIZATION LEEP      2001,Loop     LAPAROSCOPIC ABLATION ENDOMETRIOSIS      2010     TONSILLECTOMY, ADENOIDECTOMY, COMBINED      No Comments Provided       FAMILY HISTORY:   Family History   Problem Relation Age of Onset     Heart Disease Father         Heart Disease,Congestive heart failure     Family History Negative Mother         Good Health     Other - See Comments Other         12 half-siblings, no history of chronic illnesses.     Breast Cancer Maternal Grandmother         Cancer-breast     Ovarian Cancer Sister 50        Cancer-ovarian,ovarian cancer     Other - See Comments Sister          hysterectomy for bleeding.       SOCIAL HISTORY:   Social History     Tobacco Use     Smoking status: Former Smoker     Packs/day: 1.00     Years: 10.00     Pack years: 10.00     Types: Cigarettes     Quit date: 2006     Years since quittin.9     Smokeless tobacco: Never Used   Substance Use Topics     Alcohol use: Yes     Alcohol/week: 2.5 standard drinks     Comment: Alcoholic Drinks/day: occasionally, weekends      No Known Allergies  Current Outpatient Medications   Medication     clobetasol (TEMOVATE) 0.05 % cream     FLUoxetine (PROZAC) 10 MG capsule     FLUoxetine (PROZAC) 20 MG capsule     No current  facility-administered medications for this visit.          Review of Systems   Constitutional, HEENT, cardiovascular, pulmonary, gi and gu systems are negative, except as otherwise noted.       Objective          Vitals:  No vitals were obtained today due to virtual visit.    healthy, alert and no distress  PSYCH: Alert and oriented times 3; coherent speech, normal   rate and volume, able to articulate logical thoughts, able   to abstract reason, no tangential thoughts, no hallucinations   or delusions  Her affect is normal  RESP: No cough, no audible wheezing, able to talk in full sentences  Remainder of exam unable to be completed due to telephone visits            Assessment/Plan:  1. COVID-19 ruled out      Patient meets criteria for COVID-19 testing. They are informed to self quarantine until results are available. They have been provided information to complete curbside testing. Will notify with results. If positive, patient is to self-quarantine at home for 14 days.       Phone call duration: 5 minutes    Jana Carvalho PA-C on 12/3/2020 at 8:31 AM

## 2020-12-04 LAB
SARS-COV-2 RNA SPEC QL NAA+PROBE: NOT DETECTED
SPECIMEN SOURCE: NORMAL

## 2021-01-03 ENCOUNTER — HEALTH MAINTENANCE LETTER (OUTPATIENT)
Age: 51
End: 2021-01-03

## 2021-02-22 ENCOUNTER — HOSPITAL ENCOUNTER (OUTPATIENT)
Dept: GENERAL RADIOLOGY | Facility: OTHER | Age: 51
End: 2021-02-22
Attending: PHYSICIAN ASSISTANT
Payer: COMMERCIAL

## 2021-02-22 ENCOUNTER — OFFICE VISIT (OUTPATIENT)
Dept: FAMILY MEDICINE | Facility: OTHER | Age: 51
End: 2021-02-22
Attending: PHYSICIAN ASSISTANT
Payer: COMMERCIAL

## 2021-02-22 VITALS
DIASTOLIC BLOOD PRESSURE: 72 MMHG | HEIGHT: 64 IN | HEART RATE: 68 BPM | RESPIRATION RATE: 16 BRPM | WEIGHT: 181.2 LBS | BODY MASS INDEX: 30.93 KG/M2 | SYSTOLIC BLOOD PRESSURE: 94 MMHG | TEMPERATURE: 98.4 F

## 2021-02-22 DIAGNOSIS — M25.551 HIP PAIN, RIGHT: ICD-10-CM

## 2021-02-22 DIAGNOSIS — F33.0 MAJOR DEPRESSIVE DISORDER, RECURRENT EPISODE, MILD (H): ICD-10-CM

## 2021-02-22 DIAGNOSIS — L90.0 LICHEN SCLEROSUS: ICD-10-CM

## 2021-02-22 DIAGNOSIS — R53.83 FATIGUE, UNSPECIFIED TYPE: ICD-10-CM

## 2021-02-22 DIAGNOSIS — F41.1 ANXIETY, GENERALIZED: ICD-10-CM

## 2021-02-22 DIAGNOSIS — Z00.00 ROUTINE HISTORY AND PHYSICAL EXAMINATION OF ADULT: Primary | ICD-10-CM

## 2021-02-22 LAB
ANION GAP SERPL CALCULATED.3IONS-SCNC: 4 MMOL/L (ref 3–14)
BASOPHILS # BLD AUTO: 0.1 10E9/L (ref 0–0.2)
BASOPHILS NFR BLD AUTO: 1 %
BUN SERPL-MCNC: 15 MG/DL (ref 7–25)
CALCIUM SERPL-MCNC: 9.5 MG/DL (ref 8.6–10.3)
CHLORIDE SERPL-SCNC: 106 MMOL/L (ref 98–107)
CO2 SERPL-SCNC: 30 MMOL/L (ref 21–31)
CREAT SERPL-MCNC: 0.85 MG/DL (ref 0.6–1.2)
DEPRECATED CALCIDIOL+CALCIFEROL SERPL-MC: 29.8 NG/ML
DIFFERENTIAL METHOD BLD: NORMAL
EOSINOPHIL # BLD AUTO: 0.1 10E9/L (ref 0–0.7)
EOSINOPHIL NFR BLD AUTO: 2 %
ERYTHROCYTE [DISTWIDTH] IN BLOOD BY AUTOMATED COUNT: 12.6 % (ref 10–15)
GFR SERPL CREATININE-BSD FRML MDRD: 70 ML/MIN/{1.73_M2}
GLUCOSE SERPL-MCNC: 100 MG/DL (ref 70–105)
HCT VFR BLD AUTO: 39.5 % (ref 35–47)
HGB BLD-MCNC: 13 G/DL (ref 11.7–15.7)
IMM GRANULOCYTES # BLD: 0 10E9/L (ref 0–0.4)
IMM GRANULOCYTES NFR BLD: 0.1 %
LYMPHOCYTES # BLD AUTO: 2.8 10E9/L (ref 0.8–5.3)
LYMPHOCYTES NFR BLD AUTO: 39.3 %
MCH RBC QN AUTO: 30.2 PG (ref 26.5–33)
MCHC RBC AUTO-ENTMCNC: 32.9 G/DL (ref 31.5–36.5)
MCV RBC AUTO: 92 FL (ref 78–100)
MONOCYTES # BLD AUTO: 0.5 10E9/L (ref 0–1.3)
MONOCYTES NFR BLD AUTO: 7.4 %
NEUTROPHILS # BLD AUTO: 3.5 10E9/L (ref 1.6–8.3)
NEUTROPHILS NFR BLD AUTO: 50.2 %
PLATELET # BLD AUTO: 279 10E9/L (ref 150–450)
POTASSIUM SERPL-SCNC: 4 MMOL/L (ref 3.5–5.1)
RBC # BLD AUTO: 4.31 10E12/L (ref 3.8–5.2)
SODIUM SERPL-SCNC: 140 MMOL/L (ref 134–144)
TSH SERPL DL<=0.05 MIU/L-ACNC: 1.5 IU/ML (ref 0.34–5.6)
WBC # BLD AUTO: 7 10E9/L (ref 4–11)

## 2021-02-22 PROCEDURE — 85025 COMPLETE CBC W/AUTO DIFF WBC: CPT | Mod: ZL | Performed by: PHYSICIAN ASSISTANT

## 2021-02-22 PROCEDURE — 73502 X-RAY EXAM HIP UNI 2-3 VIEWS: CPT

## 2021-02-22 PROCEDURE — 84443 ASSAY THYROID STIM HORMONE: CPT | Mod: ZL | Performed by: PHYSICIAN ASSISTANT

## 2021-02-22 PROCEDURE — 82306 VITAMIN D 25 HYDROXY: CPT | Mod: ZL | Performed by: PHYSICIAN ASSISTANT

## 2021-02-22 PROCEDURE — 99396 PREV VISIT EST AGE 40-64: CPT | Performed by: PHYSICIAN ASSISTANT

## 2021-02-22 PROCEDURE — 80048 BASIC METABOLIC PNL TOTAL CA: CPT | Mod: ZL | Performed by: PHYSICIAN ASSISTANT

## 2021-02-22 PROCEDURE — 36415 COLL VENOUS BLD VENIPUNCTURE: CPT | Mod: ZL | Performed by: PHYSICIAN ASSISTANT

## 2021-02-22 RX ORDER — CLOBETASOL PROPIONATE 0.5 MG/G
CREAM TOPICAL
Qty: 60 G | Refills: 3 | Status: SHIPPED | OUTPATIENT
Start: 2021-02-22 | End: 2023-04-03

## 2021-02-22 RX ORDER — FLUOXETINE 10 MG/1
10 CAPSULE ORAL DAILY
Qty: 90 CAPSULE | Refills: 3 | Status: CANCELLED | OUTPATIENT
Start: 2021-02-22

## 2021-02-22 RX ORDER — FLUOXETINE 40 MG/1
40 CAPSULE ORAL DAILY
Qty: 90 CAPSULE | Refills: 3 | Status: SHIPPED | OUTPATIENT
Start: 2021-02-22 | End: 2022-11-14

## 2021-02-22 ASSESSMENT — PATIENT HEALTH QUESTIONNAIRE - PHQ9
SUM OF ALL RESPONSES TO PHQ QUESTIONS 1-9: 7
5. POOR APPETITE OR OVEREATING: NOT AT ALL

## 2021-02-22 ASSESSMENT — ANXIETY QUESTIONNAIRES
GAD7 TOTAL SCORE: 0
6. BECOMING EASILY ANNOYED OR IRRITABLE: NOT AT ALL
3. WORRYING TOO MUCH ABOUT DIFFERENT THINGS: NOT AT ALL
5. BEING SO RESTLESS THAT IT IS HARD TO SIT STILL: NOT AT ALL
1. FEELING NERVOUS, ANXIOUS, OR ON EDGE: NOT AT ALL
2. NOT BEING ABLE TO STOP OR CONTROL WORRYING: NOT AT ALL
7. FEELING AFRAID AS IF SOMETHING AWFUL MIGHT HAPPEN: NOT AT ALL
IF YOU CHECKED OFF ANY PROBLEMS ON THIS QUESTIONNAIRE, HOW DIFFICULT HAVE THESE PROBLEMS MADE IT FOR YOU TO DO YOUR WORK, TAKE CARE OF THINGS AT HOME, OR GET ALONG WITH OTHER PEOPLE: NOT DIFFICULT AT ALL

## 2021-02-22 ASSESSMENT — MIFFLIN-ST. JEOR: SCORE: 1416.92

## 2021-02-22 NOTE — PATIENT INSTRUCTIONS
"Healthy Strategies  1. Eat at least 3 meals a day and never skip breakfast.  2. Eat more slowly.  3. Decrease portion size.  4. Provide structure by using meal replacement bars or shakes, and/or low calorie frozen meals.  5. For good nutrition incorporate fruit, vegetables, whole grains, lean protein, and low-fat dairy.  6. Remove trigger foods from yourenvironment to avoid impulse eating.  7. Increase physical activity: get a pedometer and aim for 10,000 steps a day or 30-35 minutes of activity 5 days per week.  8. Weigh yourself daily or at least weekly.  9. Keep a record of what you eat and your activity.  10. Establish a support system such as afriend, group or program.    11. Read Froy Castro's \"Eat to Live\". Remember it is important to have a minimum of 1200 calories a day, okay to use olive oil, 40 grams of fiber daily. No more than two servings (the size of your palm) of red meat a week.     Please consider the following general health recommendations:    Eat a quality diet (generally, low in simple sugars, starches, cholesterol and saturated fat.)    Please get 1200 mg of calcium in divided doses with 800 units vitamin D in your diet daily. Take supplements as needed to obtain full recommended amounts.     Stay physically active. Regular walking or other exercise is one of the best ways to minimize pain of arthritis; maintain independence and mobility; maintain bone strength; maintain conditioning of your heart. Find something you enjoy and a friend to do it with you.    Maintain ideal weight. Your Body mass index is Body mass index is 31.1 kg/m .. Generally a BMI of 20-25 is considered ideal. Overweight is defined as 25-30, obese is 30-35 and markedly obese is greater than 35.    Apply sun block (SPF 25 or greater) on exposed skin anytime you are out in the sun to prevent skin cancer.     Wear a seatbelt whenever you are in a car.    Obtain a flu shot every fall.    You should have a tetanus booster at " least once every 10 years.    Schedule a mammogram annually starting at the age of 40 years old unless recommended earlier by your primary care provider. Come for a general exam once yearly.    Colonoscopy (an exam of the colon) is recommended every 10 years after the age of 50 to screen for colon cancer. (More often if you are at increased risk.)    A vaccine to reduce your chances of getting shingles is available if you are over 50. Information about the vaccine is available through the clinic or at:  (https://www.cdc.gov/vaccines/vpd/shingles/public/shingrix/index.html)    Check blood sugar annually. Cholesterol annually unless you have had a normal level when last checked within 5 years.     I recommend that you have a general physical exam every year. You should have a pap test every 3 years between the ages of 21 and 30 and every 3-5 years between the ages of 30 and 65 depending on your test unless you have had previous abnormal pap smears, (in these cases the exams and PAP's should be done on a schedule as recommended by your primary care provider). If you have had hysterectomy in the past, your future Pap plan may be different.

## 2021-02-22 NOTE — PROGRESS NOTES
Nursing Notes:   Ann Marie Martin LPN  2/22/2021  2:56 PM  Signed  Chief Complaint   Patient presents with     Physical         Medication Reconciliation: complete    Ann Marie Martin LPN        ANNUAL PHYSICAL - FEMALE    HPI: Johanne LEE ANN Fish who presents for a yearly exam.  Concerns include:  Patient has been feeling tired and fatigued.  Gaining weight.  Feels constipated frequently.  She would like labs completed for monitoring to rule out concerns.  No recent cough or cold symptoms.  No fevers or chills.    Patient needs a refill of her clobetasol.  Uses for lichen sclerosis/atrophy.  Uses sparingly.  History of itching and dryness.     Patient needs refill of her Prozac.  Her symptoms generally flare in the winter months.  No suicidal or homicidal ideation.  She does feel more anxious and down lately.  Wondering about increasing her dose.  Tolerates the medication well.    Patient has noticed some right hip pain.  Not affected by exercise.  No recent trauma or injury.  No bruising or swelling.    No LMP recorded. Patient is postmenopausal.   Contraception: postmenopausal  Risk for STI?: no  Last pap: 3/5/2018 - nl pap and HPV, repeat in 5 years  Any hx of abnormal paps:  Many years ago, approx 20 years ago - normal since then  FH of early CA?: no  Cholesterol/DM concerns/screening: 10/15/2019  Tobacco?: former  Calcium intake: some  DEXA: na  Last mammo: 8/19/2020  Colonoscopy: 09/28/2020, F/U 2030 hyperplastic and scattered diverticulosis  Immunizations: shingrix , declines today    Patient Active Problem List    Diagnosis Date Noted     Diverticulosis of colon, scattered 09/28/2020     Priority: Medium     Abnormal thyroid ultrasound 03/05/2018     Priority: Medium     History of cervical dysplasia 01/18/2018     Priority: Medium     Other lichen, not elsewhere classified 01/18/2018     Priority: Medium     Major depressive disorder, recurrent episode, mild (H) 01/18/2018     Priority:  Medium     Other premature beats 01/18/2018     Priority: Medium     Overview:   short [place] interval, cardiac workup negative.  Observation recommended by Dr Fletcher.       Thyroid cyst 09/17/2014     Priority: Medium     Anxiety, generalized 11/01/2012     Priority: Medium     Phlebitis and thrombophlebitis of other deep vessels of lower extremities 01/23/2010     Priority: Medium     Overview:   While on OCP's         Past Medical History:   Diagnosis Date     Dysplasia of cervix uteri     2001,abnormal Paps, treated by loop excision.     Lichen sclerosus et atrophicus     2005,external genital biopsy     Low back pain     Intermittent     Other premature depolarization     No Comments Provided     Phlebitis and thrombophlebitis of other deep vessels of unspecified lower extremity (H)     1/23/2010       Past Surgical History:   Procedure Laterality Date     BIOPSY BREAST  01/1997    benign disease.     BIOPSY BREAST  2012    Benign  Stereotactic R breast Bx [Other][     COLONOSCOPY N/A 09/28/2020    F/U 2030 hyperplastic and scattered diverticulosis     CONIZATION LEEP      9/2001,Loop     LAPAROSCOPIC ABLATION ENDOMETRIOSIS      07/20/2010     TONSILLECTOMY, ADENOIDECTOMY, COMBINED      No Comments Provided       Family History   Problem Relation Age of Onset     Heart Disease Father         Heart Disease,Congestive heart failure     Family History Negative Mother         Good Health     Other - See Comments Other         12 half-siblings, no history of chronic illnesses.     Breast Cancer Maternal Grandmother         Cancer-breast     Ovarian Cancer Sister 50        Cancer-ovarian,ovarian cancer     Other - See Comments Sister          hysterectomy for bleeding.       Social History     Tobacco Use     Smoking status: Former Smoker     Packs/day: 1.00     Years: 10.00     Pack years: 10.00     Types: Cigarettes     Quit date: 1/1/2006     Years since quitting: 15.1     Smokeless tobacco: Never Used   Substance  "Use Topics     Alcohol use: Yes     Alcohol/week: 2.5 standard drinks     Comment: Alcoholic Drinks/day: occasionally, weekends       Current Outpatient Medications   Medication Sig Dispense Refill     clobetasol (TEMOVATE) 0.05 % external cream Apply to affected area twice daily for 2 weeks only 60 g 3     FLUoxetine (PROZAC) 40 MG capsule Take 1 capsule (40 mg) by mouth daily 90 capsule 3       No Known Allergies    REVIEW OF SYSTEMS:  Refer to HPI.    PHYSICAL EXAM:  BP 94/72 (BP Location: Right arm, Patient Position: Sitting, Cuff Size: Adult Regular)   Pulse 68   Temp 98.4  F (36.9  C)   Resp 16   Ht 1.626 m (5' 4\")   Wt 82.2 kg (181 lb 3.2 oz)   BMI 31.10 kg/m    CONSTITUTIONAL:  Alert,cooperative, NAD.  EYES: No scleral icterus.  PERRLA.  Conjunctiva clear.  ENT/MOUTH: External ears and nose normal.  TMs normal.  Moist mucous membranes. Oropharynx clear.    ENDO: No thyromegaly or thyroidnodules.  LYMPH:  No cervical or supraclavicular LA.    BREASTS: Declines exam.   CARDIOVASCULAR: Regular,S1, S2.  No S3 or S4.  No murmur/gallop/rub.  No peripheral edema.  RESPIRATORY: CTA bilaterally, no wheezes, rhonchi or rales.  GI: Bowel sounds wnl.  Soft, nontender, nondistended.  No masses or HSM.  No rebound or guarding.  : Declines exam.  Pap smear obtained: no  MSKEL: Grossly normal ROM.  No clubbing.  Mild right anterior lateral hip pain with palpation.  Minimal pain with flexion, internal and external rotation.  No bruising or sign appreciated.  No spinal or paraspinous muscle pain to palpation.  No SI joint pain to palpation.  INTEGUMENTARY:  Warm, dry.  No rash noted on exposed skin.  NEUROLOGIC: Facies symmetric.  Grossly normal movement and tone.  No tremor.  PSYCHIATRIC: Affect normal.  Speech fluent.  No suicidal or homicidal ideation appreciated.     PHQ Depression Screen  PHQ-9 SCORE 12/17/2018 10/15/2019 2/22/2021   PHQ-9 Total Score 8 0 7       Labs:  Results for orders placed or performed " during the hospital encounter of 02/22/21   XR Hip Right 2-3 Views     Status: None    Narrative    PROCEDURE:  XR HIP RIGHT 2-3 VIEWS    HISTORY: Hip pain, right    COMPARISON:  None.    TECHNIQUE:  2 views of the right hip were obtained. One view of the  pelvis was obtained.    FINDINGS:  The right hip appears normal. The acetabulum and right  proximal femur appears normal.    Pelvis: The pelvis is intact. Sacrum and sacroiliac joints are normal.  The left proximal femur appears normal.       Impression    IMPRESSION: Normal pelvis and right hip      JEN CARTER MD   Results for orders placed or performed in visit on 02/22/21   Vitamin D Total     Status: None   Result Value Ref Range    Vitamin D Total 29.8 ng/mL   TSH Reflex GH     Status: None   Result Value Ref Range    TSH Reflex 1.50 0.34 - 5.60 IU/mL   Basic Metabolic Panel     Status: None   Result Value Ref Range    Sodium 140 134 - 144 mmol/L    Potassium 4.0 3.5 - 5.1 mmol/L    Chloride 106 98 - 107 mmol/L    Carbon Dioxide 30 21 - 31 mmol/L    Anion Gap 4 3 - 14 mmol/L    Glucose 100 70 - 105 mg/dL    Urea Nitrogen 15 7 - 25 mg/dL    Creatinine 0.85 0.60 - 1.20 mg/dL    GFR Estimate 70 >60 mL/min/[1.73_m2]    GFR Estimate If Black 85 >60 mL/min/[1.73_m2]    Calcium 9.5 8.6 - 10.3 mg/dL   CBC and Differential     Status: None   Result Value Ref Range    WBC 7.0 4.0 - 11.0 10e9/L    RBC Count 4.31 3.8 - 5.2 10e12/L    Hemoglobin 13.0 11.7 - 15.7 g/dL    Hematocrit 39.5 35.0 - 47.0 %    MCV 92 78 - 100 fl    MCH 30.2 26.5 - 33.0 pg    MCHC 32.9 31.5 - 36.5 g/dL    RDW 12.6 10.0 - 15.0 %    Platelet Count 279 150 - 450 10e9/L    Diff Method Automated Method     % Neutrophils 50.2 %    % Lymphocytes 39.3 %    % Monocytes 7.4 %    % Eosinophils 2.0 %    % Basophils 1.0 %    % Immature Granulocytes 0.1 %    Absolute Neutrophil 3.5 1.6 - 8.3 10e9/L    Absolute Lymphocytes 2.8 0.8 - 5.3 10e9/L    Absolute Monocytes 0.5 0.0 - 1.3 10e9/L    Absolute  Eosinophils 0.1 0.0 - 0.7 10e9/L    Absolute Basophils 0.1 0.0 - 0.2 10e9/L    Abs Immature Granulocytes 0.0 0 - 0.4 10e9/L       ASSESSMENT AND PLAN:      ICD-10-CM    1. Routine history and physical examination of adult  Z00.00    2. Anxiety, generalized  F41.1 FLUoxetine (PROZAC) 40 MG capsule   3. Major depressive disorder, recurrent episode, mild (H)  F33.0 FLUoxetine (PROZAC) 40 MG capsule   4. Lichen sclerosus  L90.0 clobetasol (TEMOVATE) 0.05 % external cream   5. Fatigue, unspecified type  R53.83 CBC and Differential     Basic Metabolic Panel     TSH Reflex GH     Vitamin D Total     Vitamin D Total     TSH Reflex GH     Basic Metabolic Panel     CBC and Differential   6. Hip pain, right  M25.551 CHIROPRACTIC REFERRAL     XR Hip Right 2-3 Views       Right hip pain:  Completed right hip xray.  I personally reviewed the xray. I found no fracture appreciated upon initial read of xray.  Final read pending by radiology.    Referred patient to the chiropractor for a consult.  Encouraged to take ibuprofen for relief up to 4 times per day.  Encouraged rest and elevation.  Encouraged to use ice or heat 15 minutes at a time several times per day to decrease pain. Return to clinic in 1-2 weeks as necessary for persistent pain. Return to clinic with any change or worsening of symptoms.       Anxiety: Increase fluoxetine to 40 mg daily.  Gave side effect profile.  Encourage good diet and exercise.  Encouraged to see counselor if preferred.  Return over the next 2 to 3 months if symptoms are not calming down or worsening if needed.    Lichen sclerosus: Refilled clobetasol cream.  No acute concerns at this time.  Recheck as needed.    Fatigue: Completed labs to rule out concerns including CBC, BMP, TSH, and vitamin D.  Labs are pending.  Encourage good diet and exercise.    Return in 1 year for repeat physical.    Relevant cancer screening discussed.    Counseled on healthy diet, Calcium and vitamin D intake, and  "exercise.    Patient Instructions   Healthy Strategies  1. Eat at least 3 meals a day and never skip breakfast.  2. Eat more slowly.  3. Decrease portion size.  4. Provide structure by using meal replacement bars or shakes, and/or low calorie frozen meals.  5. For good nutrition incorporate fruit, vegetables, whole grains, lean protein, and low-fat dairy.  6. Remove trigger foods from yourenvironment to avoid impulse eating.  7. Increase physical activity: get a pedometer and aim for 10,000 steps a day or 30-35 minutes of activity 5 days per week.  8. Weigh yourself daily or at least weekly.  9. Keep a record of what you eat and your activity.  10. Establish a support system such as afriend, group or program.    11. Read Froy Castro's \"Eat to Live\". Remember it is important to have a minimum of 1200 calories a day, okay to use olive oil, 40 grams of fiber daily. No more than two servings (the size of your palm) of red meat a week.     Please consider the following general health recommendations:    Eat a quality diet (generally, low in simple sugars, starches, cholesterol and saturated fat.)    Please get 1200 mg of calcium in divided doses with 800 units vitamin D in your diet daily. Take supplements as needed to obtain full recommended amounts.     Stay physically active. Regular walking or other exercise is one of the best ways to minimize pain of arthritis; maintain independence and mobility; maintain bone strength; maintain conditioning of your heart. Find something you enjoy and a friend to do it with you.    Maintain ideal weight. Your Body mass index is Body mass index is 31.1 kg/m .. Generally a BMI of 20-25 is considered ideal. Overweight is defined as 25-30, obese is 30-35 and markedly obese is greater than 35.    Apply sun block (SPF 25 or greater) on exposed skin anytime you are out in the sun to prevent skin cancer.     Wear a seatbelt whenever you are in a car.    Obtain a flu shot every fall.    You " should have a tetanus booster at least once every 10 years.    Schedule a mammogram annually starting at the age of 40 years old unless recommended earlier by your primary care provider. Come for a general exam once yearly.    Colonoscopy (an exam of the colon) is recommended every 10 years after the age of 50 to screen for colon cancer. (More often if you are at increased risk.)    A vaccine to reduce your chances of getting shingles is available if you are over 50. Information about the vaccine is available through the clinic or at:  (https://www.cdc.gov/vaccines/vpd/shingles/public/shingrix/index.html)    Check blood sugar annually. Cholesterol annually unless you have had a normal level when last checked within 5 years.     I recommend that you have a general physical exam every year. You should have a pap test every 3 years between the ages of 21 and 30 and every 3-5 years between the ages of 30 and 65 depending on your test unless you have had previous abnormal pap smears, (in these cases the exams and PAP's should be done on a schedule as recommended by your primary care provider). If you have had hysterectomy in the past, your future Pap plan may be different.            Johanne Welch PA-C PA-C..................2/22/2021 2:52 PM

## 2021-02-23 ASSESSMENT — ANXIETY QUESTIONNAIRES: GAD7 TOTAL SCORE: 0

## 2021-03-08 ENCOUNTER — THERAPY VISIT (OUTPATIENT)
Dept: CHIROPRACTIC MEDICINE | Facility: OTHER | Age: 51
End: 2021-03-08
Attending: CHIROPRACTOR
Payer: COMMERCIAL

## 2021-03-08 DIAGNOSIS — M54.50 RIGHT-SIDED LOW BACK PAIN WITHOUT SCIATICA, UNSPECIFIED CHRONICITY: ICD-10-CM

## 2021-03-08 DIAGNOSIS — M99.05 SEGMENTAL AND SOMATIC DYSFUNCTION OF PELVIC REGION: Primary | ICD-10-CM

## 2021-03-08 DIAGNOSIS — M54.6 ACUTE RIGHT-SIDED THORACIC BACK PAIN: ICD-10-CM

## 2021-03-08 DIAGNOSIS — M25.551 HIP PAIN, RIGHT: ICD-10-CM

## 2021-03-08 DIAGNOSIS — M99.02 SEGMENTAL AND SOMATIC DYSFUNCTION OF THORACIC REGION: ICD-10-CM

## 2021-03-08 PROCEDURE — 98940 CHIROPRACT MANJ 1-2 REGIONS: CPT | Mod: AT | Performed by: CHIROPRACTOR

## 2021-03-08 PROCEDURE — 99213 OFFICE O/P EST LOW 20 MIN: CPT | Mod: 25 | Performed by: CHIROPRACTOR

## 2021-03-08 NOTE — PROGRESS NOTES
Mar 8, 2021  PATIENT:  Johanne Fish is a 50 year old  female presenting for chiropractic evaluation for:   Referred by: CINDY Cunningham    Date of Initial Visit for this Episode:  Mar 8, 2021   Visit #1/6-8    SUBJECTIVE / HPI:   Primary complaint:   Right low back and hip pain.  Onset: Bothering her for 1-2 years, no traumatic event.  She has been an active member attending fitness classes and yoga at the [facility] until shutdown due to the coronavirus pandemic.  She attempted to do some at home classes and yoga on her own but admits to being less active.  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  She mentioned this at her regular follow-up visit for other healthcare concerns with gynecologist, who x-rayed her right hip without significant findings and referred her for chiropractic care.  He has had positive results with chiropractic care in the far past.    Location: Right sacroiliac, posterior and lateral hip pain  Radiation of pain: no  Quality:aching  Duration and Frequency of Pain: frequent  Pain rated currently:  2/10  Pain rated at it's worst: 2/10  Worse with:  standing from sitting, turning over in bed walking, but may be with sitting.  Improved by:  Ibuprofen  Previous treatment: Tried heat, some yoga.    Pain course: Staying the same    Secondary complaint:  Upper back pain  Onset: 1 week  Location: , under scapular R>L  Radiation of pain: No  Quality: burning, tension  Duration and Frequency of Pain: constant  Pain rated currently:  4/10  Pain rated at it's worst: 4/10  Worse with: In activity  Improved by:  Ibuprofen  Previous treatment: None  Pain course: Not changing    Additional Features: History of constipation. Walking, drinking water, increased fiber intake.    Other Health Care Providers seen for this: Sharon Welch  Previous injury:none    See flowsheets in chart for details.  Neck Disability Index (  Jesus H. and Sunitha OVALLE. 1991. All  rights reserved.; used with permission) 3/8/2021   SECTION 1 - PAIN INTENSITY 0   SECTION 2 - PERSONAL CARE 0   SECTION 3 - LIFTING 0   SECTION 4 - READING 1   SECTION 5 - HEADACHES 1   SECTION 6 - CONCENTRATION 0   SECTION 7 - WORK 0   SECTION 8 - DRIVING 0   SECTION 9 - SLEEPING 0   SECTION 10 - RECREATION 1   Count 10   Sum 3   Raw Score: /50 3   Neck Disability Index Score: (%) 6     Oswestry (COLETTE) Questionnaire    OSWESTRY DISABILITY INDEX 3/8/2021   Count 9   Sum 5   Oswestry Score (%) 11.11   Some recent data might be hidden     ROS:  The patient denies any fevers, chills, nausea, vomiting, diarrhea, constipation,dysuria, hematuria, or urinary hesitancy or incontinence.  No shortness of breath, chest pain, or rashes.    Patient Active Problem List   Diagnosis     Anxiety, generalized     History of cervical dysplasia     Phlebitis and thrombophlebitis of other deep vessels of lower extremities     Other lichen, not elsewhere classified     Major depressive disorder, recurrent episode, mild (H)     Other premature beats     Thyroid cyst     Abnormal thyroid ultrasound     Diverticulosis of colon, scattered       ALLERGIES:  No Known Allergies    CURRENT MEDICATIONS:  Current Outpatient Medications   Medication     clobetasol (TEMOVATE) 0.05 % external cream     FLUoxetine (PROZAC) 40 MG capsule     No current facility-administered medications for this visit.        PAST MEDICAL HISTORY:  Past Medical History:   Diagnosis Date     Dysplasia of cervix uteri     2001,abnormal Paps, treated by loop excision.     Lichen sclerosus et atrophicus     2005,external genital biopsy     Low back pain     Intermittent     Other premature depolarization     No Comments Provided     Phlebitis and thrombophlebitis of other deep vessels of unspecified lower extremity (H)     1/23/2010       PAST SURGICAL HISTORY:  Past Surgical History:   Procedure Laterality Date     BIOPSY BREAST  01/1997    benign disease.     BIOPSY BREAST  " 2012    Benign  Stereotactic R breast Bx [Other][     COLONOSCOPY N/A 09/28/2020    F/U 2030 hyperplastic and scattered diverticulosis     CONIZATION LEEP      9/2001,Loop     LAPAROSCOPIC ABLATION ENDOMETRIOSIS      07/20/2010     TONSILLECTOMY, ADENOIDECTOMY, COMBINED      No Comments Provided       FAMILY HISTORY:  Family History   Problem Relation Age of Onset     Heart Disease Father         Heart Disease,Congestive heart failure     Family History Negative Mother         Good Health     Other - See Comments Other         12 half-siblings, no history of chronic illnesses.     Breast Cancer Maternal Grandmother         Cancer-breast     Ovarian Cancer Sister 50        Cancer-ovarian,ovarian cancer     Other - See Comments Sister          hysterectomy for bleeding.       SOCIAL HISTORY:    Social History     Social History Narrative    Patient worked at [employer] as ; now working in the school district.  .  Lives in her own house with her younger child. Older child grown.    Tahir  Child - 09/14/91  Sean  Child - 05/05/94         OBJECTIVE:    DIAGNOSTIC TESTS:  Reviewed current spinal imaging taken with patient. My impression: mild pelvic misalignment and lumbar misalignment.     Recent Results (from the past 744 hour(s))   XR Hip Right 2-3 Views    Narrative    PROCEDURE:  XR HIP RIGHT 2-3 VIEWS    HISTORY: Hip pain, right    COMPARISON:  None.    TECHNIQUE:  2 views of the right hip were obtained. One view of the  pelvis was obtained.    FINDINGS:  The right hip appears normal. The acetabulum and right  proximal femur appears normal.    Pelvis: The pelvis is intact. Sacrum and sacroiliac joints are normal.  The left proximal femur appears normal.       Impression    IMPRESSION: Normal pelvis and right hip      JEN CARTER MD         PHYSICAL EXAM:     GENERAL APPEARANCE: healthy, alert and no distress \"affect normal/bright\"  SKIN: no suspicious lesions or rashes  NEURO: " cranial nerves 2-12 intact, normal gait.     MUSCULOSKELETAL:   Posture and Gait: unremarkable    Thoracic/Lumbar/Pelvic  PSIS/Iliac Crests:  Low left    AROM:   halting arc of motion   90/90 flexion 20/30 extension    35/45 RLF    25/45 LLF     + Kemps: thoracic, L sacroiliac, R sacroiliac   +Gillets: +L sacroiliac restricted   -Straight leg raise: Active left SLR +R low back/hip pain    Nachlas: negative   Yeomans: +R anterior hip/thigh  Elys:  Left +R sacroiliac; Right negative  +Bilateral external rotation limited  Tammy: negative, greater passive ROM internal rotation compared to external rotation    Hip PROM: right restricted extension, external rotation  -Hip Scour  -Fabere    Tenderness: R anterior, lateral hip and sacroiliac   Muscle spasm:  R QL, moderate bilateral extensor paraspinals, hip flexors  Motion restriction: T4 with extension and left rotation, T9 with extension and right rotation, L sacroiliac into extension     +Joint asymmetry and restriction: T4 flex/R, T9 flex/L, L PI ilium    ASSESSMENT: Johanne Fish with spinal and pelvic mechanical dysfunction with tight hip flexors and back extensors.  She is a great candidate for chiropractic care and low back and hip opening stretching.  She has exercise and yoga experience and encouraged her to use deep breathing techniques to improve mobility with prescribed exercises today.  She will return in 2 weeks. I expect good progress with 2-4 weeks of care trial. She may require further care up to 6 weeks.   If right hip not improving as expected, will further evaluate or consider Physical Therapy referral.      Hip pain, right  Segmental and somatic dysfunction of pelvic region  Right-sided low back pain without sciatica, unspecified chronicity  Segmental and somatic dysfunction of thoracic region  Acute right-sided thoracic back pain    PLAN    Evaluation and Management     Modalities:  None performed this visit    Therapeutic procedures:  60400:  Therapeutic Exercises  Stretches - Instructions for home exercise program as well as in-office session of a minimum of 7 minutes of the stretching was done today.    Lying on back:     Knee (s) to chest    Knee to opposite shoulder    Crossed legs to chest (or seated)    Straight leg raise   On stomach: baby cobra/press up    CMT:  [id number] Chiropractic manipulative treatment 1-2 regions performed   Thoracic: Diversified, T4, T9, Prone  Pelvis: Drop Table, PSIS Left , Prone    Response to Treatment:  Reduction in symptoms as reported by patient  Prognosis: Good    Mar 8, 2021 Chiropractic Plan of Care:   Impression, treatment options, risks and benefits discussed and patient agreeable to plan of care.     Chiropractic Care including Spinal Adjustments, physiotherapy modalities (Manual Therapy and  Manual or Instrument Soft tissue massage techniques, Ultrasound, Electronic Muscle Stimulation), Neuromuscular Re-education, Self care and Active care instruction(in-office exercise focused toward patient establishing a progressive home based exercise program).  Plan of care includes shared decision making with patient to meet their individual subjective and established criteria of rehabilitative objective goals to reduce symptoms affecting function.       Frequency: 1-2xweek   Length: 4 weeks  Progress Evaluation (approx date):  at 2 - 4 weeks         Mar 8, 2021 Goals:   short term   Patient will report able to stand up from sitting in a couch or chair without pain.   Patient will report improved turning in bed without increasing back pain.    Patient will demonstrate an improved ability to complete Activities of Daily Living  as shown by a reported 25-30% reduced score on neck and/or back index.    Patient will demonstrate improved full ROM.     Patient will report reduced symptoms by 2 points on 0-10 pt. Scale.      INSTRUCTIONS   Patient Instructions   Lying on back:     Knee (s) to chest    Knee to opposite  shoulder    Crossed legs to chest (or seated)    Straight leg raise   On stomach: baby cobra/press up        stretch as instructed at visit and check lumbar spine in 2 weeks.    No follow-ups on file.     Disclaimer: This note consists of symbols derived from keyboarding, dictation and/or voice recognition software. As a result, there may be errors in the script that have gone undetected. Please consider this when interpreting information found in this chart.    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

## 2021-03-08 NOTE — PATIENT INSTRUCTIONS
Stretch and return >3/22 for follow up care     Lying on back:     Knee (s) to chest    Knee to opposite shoulder    Crossed legs to chest (or seated)    Straight leg raise   On stomach: baby cobra/press up

## 2021-03-23 ENCOUNTER — THERAPY VISIT (OUTPATIENT)
Dept: CHIROPRACTIC MEDICINE | Facility: OTHER | Age: 51
End: 2021-03-23
Attending: CHIROPRACTOR
Payer: COMMERCIAL

## 2021-03-23 DIAGNOSIS — M54.6 ACUTE RIGHT-SIDED THORACIC BACK PAIN: ICD-10-CM

## 2021-03-23 DIAGNOSIS — M99.05 SEGMENTAL AND SOMATIC DYSFUNCTION OF PELVIC REGION: ICD-10-CM

## 2021-03-23 DIAGNOSIS — M99.02 SEGMENTAL AND SOMATIC DYSFUNCTION OF THORACIC REGION: ICD-10-CM

## 2021-03-23 DIAGNOSIS — M54.50 RIGHT-SIDED LOW BACK PAIN WITHOUT SCIATICA, UNSPECIFIED CHRONICITY: ICD-10-CM

## 2021-03-23 DIAGNOSIS — M25.551 HIP PAIN, RIGHT: Primary | ICD-10-CM

## 2021-03-23 PROCEDURE — 98940 CHIROPRACT MANJ 1-2 REGIONS: CPT | Mod: AT | Performed by: CHIROPRACTOR

## 2021-03-23 NOTE — PROGRESS NOTES
Mar 23, 2021  Visit #: 2 of 6-8, based on treatment plan for this episode from Initial Evaluation.    Subjective:  Johanne Fish is a 50 year old female who is seen in f/u up for:        Hip pain, right  Segmental and somatic dysfunction of pelvic region  Right-sided low back pain without sciatica, unspecified chronicity  Segmental and somatic dysfunction of thoracic region  Acute right-sided thoracic back pain.     Since last visit on 3/8/2021,  Johanne Fish reports the following changes: She felt better after initial improvement.  She admits to not fully being compliant with prescribed stretches however coworkers have been doing a yoga video over lunch hours this past week.  Her primary complaint today is right scapular pain and tension.  She is curious about using a foam roller at the [facility].    Thoracic :  Symptoms are graded at 6 7/10.  Right-sided  The quality is burning, sharp, dull, tight and  frequent.    Motion has remained about the same, no improvement.     Lumbar :  Symptoms are graded at 2/10.  Right sided.  The quality is achey, frequent.    Motion has increased, but is still not normal.        Objective:  Forward rolled shoulders, reduced thoracic rotation and extension    P: palpatory tenderness:  Rhomboids, T-spine paraspinal and Traps R>>L, T4 on right, T8-9    A: asymmetric joints, thoracic, pelvis    R: motion palpation notes restricted motion, T4 , T9  and PSIS Left     T: Muscle spasm: Lower trapezius, rhomboids, extensor paraspinals right greater than left    S: Segmental spinal dysfunction/restrictions found at:   T4   T9  PSIS Left    Assessment:  Patient's condition:  Patient had restrictions pre-manipulation, Patient low back and hip symptoms are staying the same and Patient right upper back symptoms are worse due to computer work.    Diagnoses:      1. Hip pain, right    2. Segmental and somatic dysfunction of pelvic region    3. Right-sided low back pain without sciatica,  unspecified chronicity    4. Segmental and somatic dysfunction of thoracic region    5. Acute right-sided thoracic back pain        Procedures:  CMT:  31581 Chiropractic manipulative treatment 1-2 regions performed   Thoracic: Diversified, T4, T8, Prone, Supine, Anterior dorsal  Pelvis: Diversified and Drop Table, PSIS Left , Prone, Side posture    Modalities: None performed this visit    Therapeutic procedures: 17007: Therapeutic Exercises-The following were demonstrated and practiced:   Stretches - Instructions for home exercise program as well as in-office session of a minimum of 8 minutes of the stretching was done today.     Try self massage techniques utilizing massage ball and painful tight muscles and lying on back with foam roller supporting spine and deep diaphragm breathing.       Response to Treatment: Patient reports feeling looser  Progress towards Goals: Patient is making progress towards the goal, stretching instruction useful.       Recommendations:  stretch as instructed   Patient Instructions   Side lying reach and roll torso rotation.  Try self massage techniques.       Next Progress Evaluation Date (approximate):      Margot Vidal DC on 3/23/2021

## 2021-03-29 ENCOUNTER — THERAPY VISIT (OUTPATIENT)
Dept: CHIROPRACTIC MEDICINE | Facility: OTHER | Age: 51
End: 2021-03-29
Attending: PHYSICIAN ASSISTANT
Payer: COMMERCIAL

## 2021-03-29 DIAGNOSIS — M54.6 ACUTE RIGHT-SIDED THORACIC BACK PAIN: ICD-10-CM

## 2021-03-29 DIAGNOSIS — M25.551 HIP PAIN, RIGHT: ICD-10-CM

## 2021-03-29 DIAGNOSIS — M99.05 SEGMENTAL AND SOMATIC DYSFUNCTION OF PELVIC REGION: ICD-10-CM

## 2021-03-29 DIAGNOSIS — M99.02 SEGMENTAL AND SOMATIC DYSFUNCTION OF THORACIC REGION: Primary | ICD-10-CM

## 2021-03-29 DIAGNOSIS — M99.03 SEGMENTAL AND SOMATIC DYSFUNCTION OF LUMBAR REGION: ICD-10-CM

## 2021-03-29 DIAGNOSIS — M54.50 RIGHT-SIDED LOW BACK PAIN WITHOUT SCIATICA, UNSPECIFIED CHRONICITY: ICD-10-CM

## 2021-03-29 PROCEDURE — 98941 CHIROPRACT MANJ 3-4 REGIONS: CPT | Mod: AT | Performed by: CHIROPRACTOR

## 2021-03-29 NOTE — PROGRESS NOTES
Mar 29, 2021  Visit #: 3 of 6-8, based on treatment plan for this episode from Initial Evaluation.    Subjective:  Johanne Fish is a 50 year old female who is seen in f/u up for:        Segmental and somatic dysfunction of thoracic region  Hip pain, right  Segmental and somatic dysfunction of pelvic region  Right-sided low back pain without sciatica, unspecified chronicity  Acute right-sided thoracic back pain  Segmental and somatic dysfunction of lumbar region.     Since last visit on 3/29/2021,  Johanne Fish reports the following changes: improving. No further headaches since initial visit.   Burning moderately severe diffuse scapular pain and tension, now reduced to mild and focal on right.   coworkers have been doing a yoga  Class together at lunch and finds helpful.     Thoracic :  Symptoms are graded at 1-3/10.  Right-sided    Lumbar :  Symptoms are graded at 0-2/10.       Objective:  Forward rolled shoulders, reduced thoracic rotation and extension    P: palpatory tenderness:  Rhomboids and T-spine paraspinal R>L, T4 on right, T8-9    A: asymmetric joints, thoracic, lumbar, pelvis    R: motion palpation notes restricted motion, T4 , T9 , L5  and PSIS Left     T: Muscle spasm: Lower trapezius, rhomboids, extensor paraspinals right greater than left    S: Segmental spinal dysfunction/restrictions found at:   T4   T9  L5  PSIS Left    Assessment:  restrictions pre-manipulation,  symptoms are improving    Diagnoses:      1. Segmental and somatic dysfunction of thoracic region    2. Hip pain, right    3. Segmental and somatic dysfunction of pelvic region    4. Right-sided low back pain without sciatica, unspecified chronicity    5. Acute right-sided thoracic back pain    6. Segmental and somatic dysfunction of lumbar region        Procedures:  CMT:  42525 Chiropractic manipulative treatment 3 regions performed   Thoracic: Diversified, T4, T8, Prone   LUmbar:  Diversified, L5, Side posture  Pelvis:  Diversified , PSIS Left , Prone, Side posture    Modalities: None performed this visit    Therapeutic procedures: 40608: Therapeutic Exercises-The following were demonstrated and practiced: neutral pelvis education. Reduce anterior pelvic tilt, brace abdominals to strengthen spine.   5 minutes.  Response to Treatment: Patient reports feeling looser  Progress towards Goals: Patient is making progress towards the goal,  instruction useful.       Recommendations:  stretch as instructed   Patient Instructions   3/29/2021 Reduce anterior pelvic tilt, brace abdominals to strengthen spine.     Continue Home exercises   Lying on back:     Knee (s) to chest    Knee to opposite shoulder    Crossed legs to chest (or seated)    Straight leg raise   On stomach: baby cobra/press up  Side lying reach and roll torso rotation.  Try self massage techniques.     Re-evaluate 4/5-4/12.      Next Progress Evaluation Date (approximate):  In 2 visits    Margot Vidal DC

## 2021-03-29 NOTE — PATIENT INSTRUCTIONS
3/29/2021 Reduce anterior pelvic tilt, brace abdominals to strengthen spine.     Continue Home exercises   Lying on back:     Knee (s) to chest    Knee to opposite shoulder    Crossed legs to chest (or seated)    Straight leg raise   On stomach: baby cobra/press up  Side lying reach and roll torso rotation.  Try self massage techniques.     Re-evaluate 4/5-4/12.

## 2021-04-05 ENCOUNTER — THERAPY VISIT (OUTPATIENT)
Dept: CHIROPRACTIC MEDICINE | Facility: OTHER | Age: 51
End: 2021-04-05
Attending: PHYSICIAN ASSISTANT
Payer: COMMERCIAL

## 2021-04-05 DIAGNOSIS — M99.05 SEGMENTAL AND SOMATIC DYSFUNCTION OF PELVIC REGION: ICD-10-CM

## 2021-04-05 DIAGNOSIS — M54.50 RIGHT-SIDED LOW BACK PAIN WITHOUT SCIATICA, UNSPECIFIED CHRONICITY: ICD-10-CM

## 2021-04-05 DIAGNOSIS — M25.551 HIP PAIN, RIGHT: ICD-10-CM

## 2021-04-05 DIAGNOSIS — M54.6 ACUTE RIGHT-SIDED THORACIC BACK PAIN: ICD-10-CM

## 2021-04-05 DIAGNOSIS — M99.03 SEGMENTAL AND SOMATIC DYSFUNCTION OF LUMBAR REGION: ICD-10-CM

## 2021-04-05 DIAGNOSIS — M99.02 SEGMENTAL AND SOMATIC DYSFUNCTION OF THORACIC REGION: Primary | ICD-10-CM

## 2021-04-05 PROCEDURE — 98941 CHIROPRACT MANJ 3-4 REGIONS: CPT | Mod: AT | Performed by: CHIROPRACTOR

## 2021-04-05 NOTE — PROGRESS NOTES
Apr 5, 2021  Visit #: 4 of 6-8, based on treatment plan for this episode from Initial Evaluation.    Subjective:  Johanne Fish is a 50 year old female who is seen in f/u up for:        Segmental and somatic dysfunction of thoracic region  Hip pain, right  Segmental and somatic dysfunction of pelvic region  Right-sided low back pain without sciatica, unspecified chronicity  Acute right-sided thoracic back pain.     Since last visit on 3/29/2021,  Johanne Fish reports the following changes: improving. Mild burning right scapula with some movements.  Radiatesoccasionally into her right arm. Has been more aware of better posture habits and stretching feels so good.Taking ibuprofen at night before bed a few times a week to prevent stiffness and it seems to help.   Massage scheduled for 4/7.      Mar 8, 2021 Goals:   short term              Patient will report able to stand up from sitting in a couch or chair without pain. 4/5/2021 better               Patient will report improved turning in bed without increasing back pain. 4/5/2021 better               Patient will demonstrate an improved ability to complete Activities of Daily Living   as shown by a reported 25-30% reduced score on neck and/or back index. Not assessed today.               Patient will demonstrate improved full ROM.  4/5/2021 Restricted right lateral flexion with radiating right arm pain.              Patient will report reduced symptoms by 2 points on 0-10 pt. Scale.  4/5/2021 goal met, No further headaches since initial visit.       Thoracic :  Symptoms are graded at 0-210.  Right-sided    Lumbar :  Symptoms are graded at 0-1/10.       Objective:  Forward rolled shoulders, reduced thoracic rotation and extension    P: palpatory tenderness:  Rhomboids and T-spine paraspinal R>L, T4 on right, T8-9    A: asymmetric joints, thoracic, lumbar, pelvis    R: motion palpation notes restricted motion, T4 , T9 , L5  and PSIS Left     T: Muscle  spasm: Lower trapezius, rhomboids, extensor paraspinals right greater than left    S: Segmental spinal dysfunction/restrictions found at:   T4   T9  L5  PSIS Left    Assessment:  No pain with lumbar extension, active straight leg raising, Nachlas, Tammy or Elys testing.  Lateral flexion to right restricted and positive for right arm radiculopathy. Shown thoracic rotation side lying spinal stretching to add to home exercise and Return for 2 weekly visists.   Diagnoses:      1. Segmental and somatic dysfunction of thoracic region    2. Hip pain, right    3. Segmental and somatic dysfunction of pelvic region    4. Right-sided low back pain without sciatica, unspecified chronicity    5. Acute right-sided thoracic back pain        Procedures:  CMT:  11959 Chiropractic manipulative treatment 3-4 regions performed   Thoracic: Diversified, T4, T8, Prone, Anterior dorsal   Lumbar:  Diversified, L5, Side posture  Pelvis: Diversified , PSIS Left , Prone, Side posture    Modalities: None performed this visit    Therapeutic procedures: 96207: Therapeutic Exercises-     Add Side lying spinal rotation stretch.  3 min.      Response to Treatment: Patient reports feeling looser  Progress towards Goals: Patient is making progress towards the goal,  instruction useful.       Recommendations:  stretch as instructed   Patient Instructions   Add Side lying spinal rotation stretch.     Return weekly for 2 weeks and reasses ROM.   Next Progress Evaluation Date (approximate):  In 2 weeks approx. 4/19/2021  Margot Vidal DC

## 2021-07-29 ENCOUNTER — OFFICE VISIT (OUTPATIENT)
Dept: FAMILY MEDICINE | Facility: OTHER | Age: 51
End: 2021-07-29
Attending: FAMILY MEDICINE
Payer: COMMERCIAL

## 2021-07-29 VITALS
SYSTOLIC BLOOD PRESSURE: 110 MMHG | RESPIRATION RATE: 16 BRPM | DIASTOLIC BLOOD PRESSURE: 76 MMHG | HEART RATE: 77 BPM | WEIGHT: 179.2 LBS | BODY MASS INDEX: 30.76 KG/M2 | OXYGEN SATURATION: 99 % | TEMPERATURE: 97.9 F

## 2021-07-29 DIAGNOSIS — E04.1 THYROID CYST: ICD-10-CM

## 2021-07-29 DIAGNOSIS — M54.2 ANTERIOR NECK PAIN: Primary | ICD-10-CM

## 2021-07-29 PROCEDURE — 99213 OFFICE O/P EST LOW 20 MIN: CPT | Performed by: FAMILY MEDICINE

## 2021-07-29 ASSESSMENT — ANXIETY QUESTIONNAIRES
2. NOT BEING ABLE TO STOP OR CONTROL WORRYING: NOT AT ALL
7. FEELING AFRAID AS IF SOMETHING AWFUL MIGHT HAPPEN: NOT AT ALL
6. BECOMING EASILY ANNOYED OR IRRITABLE: NOT AT ALL
7. FEELING AFRAID AS IF SOMETHING AWFUL MIGHT HAPPEN: NOT AT ALL
5. BEING SO RESTLESS THAT IT IS HARD TO SIT STILL: NOT AT ALL
3. WORRYING TOO MUCH ABOUT DIFFERENT THINGS: NOT AT ALL
GAD7 TOTAL SCORE: 0
1. FEELING NERVOUS, ANXIOUS, OR ON EDGE: NOT AT ALL
GAD7 TOTAL SCORE: 0
GAD7 TOTAL SCORE: 0
8. IF YOU CHECKED OFF ANY PROBLEMS, HOW DIFFICULT HAVE THESE MADE IT FOR YOU TO DO YOUR WORK, TAKE CARE OF THINGS AT HOME, OR GET ALONG WITH OTHER PEOPLE?: NOT DIFFICULT AT ALL
4. TROUBLE RELAXING: NOT AT ALL

## 2021-07-29 ASSESSMENT — PATIENT HEALTH QUESTIONNAIRE - PHQ9
10. IF YOU CHECKED OFF ANY PROBLEMS, HOW DIFFICULT HAVE THESE PROBLEMS MADE IT FOR YOU TO DO YOUR WORK, TAKE CARE OF THINGS AT HOME, OR GET ALONG WITH OTHER PEOPLE: NOT DIFFICULT AT ALL
SUM OF ALL RESPONSES TO PHQ QUESTIONS 1-9: 4
SUM OF ALL RESPONSES TO PHQ QUESTIONS 1-9: 4

## 2021-07-29 ASSESSMENT — PAIN SCALES - GENERAL: PAINLEVEL: NO PAIN (1)

## 2021-07-29 NOTE — NURSING NOTE
Patient is here for concerns of pain in her neck on the right side. States has been going on for a couple weeks.     No LMP recorded (lmp unknown). Patient is postmenopausal.  Medication Reconciliation: complete    Chelsi Shaikh LPN  7/29/2021 3:44 PM      FOOD SECURITY SCREENING QUESTIONS  Hunger Vital Signs:  Within the past 12 months we worried whether our food would run out before we got money to buy more. Never  Within the past 12 months the food we bought just didn't last and we didn't have money to get more. Never  Chelsi Shaikh LPN 7/29/2021 3:44 PM

## 2021-07-29 NOTE — PROGRESS NOTES
SUBJECTIVE:   Johanne Fish is a 51 year old female who presents to clinic today for the following health issues:  Nursing Notes:   Chelsi Shaikh LPN  7/29/2021  3:48 PM  Signed  Patient is here for concerns of pain in her neck on the right side. States has been going on for a couple weeks.     No LMP recorded (lmp unknown). Patient is postmenopausal.  Medication Reconciliation: complete    Chelsi Shaikh LPN  7/29/2021 3:44 PM      FOOD SECURITY SCREENING QUESTIONS  Hunger Vital Signs:  Within the past 12 months we worried whether our food would run out before we got money to buy more. Never  Within the past 12 months the food we bought just didn't last and we didn't have money to get more. Never  Chelsi Shaikh LPN 7/29/2021 3:44 PM      HPI  Pleasant 51-year-old female presents with discomfort in the right side of her neck.  Its just lateral to the thyroid gland.  Is been present for 3 weeks.  Is a very acidic sharp pain with certain situations.  She will notice it when talking or straining her voice.  Denies any difficulty swallowing or change in voice.  She does have a history of thyroid cyst based on previous ultrasounds.  No recent cough or cold symptoms.  No other lumps or bumps.    Patient Active Problem List    Diagnosis Date Noted     Diverticulosis of colon, scattered 09/28/2020     Priority: Medium     Abnormal thyroid ultrasound 03/05/2018     Priority: Medium     History of cervical dysplasia 01/18/2018     Priority: Medium     Other lichen, not elsewhere classified 01/18/2018     Priority: Medium     Major depressive disorder, recurrent episode, mild (H) 01/18/2018     Priority: Medium     Other premature beats 01/18/2018     Priority: Medium     Overview:   short CO interval, cardiac workup negative.  Observation recommended by Dr Fletcher.       Thyroid cyst 09/17/2014     Priority: Medium     Anxiety, generalized 11/01/2012     Priority: Medium     Phlebitis and thrombophlebitis  of other deep vessels of lower extremities 01/23/2010     Priority: Medium     Overview:   While on OCP's       Past Medical History:   Diagnosis Date     Dysplasia of cervix uteri     2001,abnormal Paps, treated by loop excision.     Lichen sclerosus et atrophicus     2005,external genital biopsy     Low back pain     Intermittent     Other premature depolarization     No Comments Provided     Phlebitis and thrombophlebitis of other deep vessels of unspecified lower extremity (H)     1/23/2010      Past Surgical History:   Procedure Laterality Date     BIOPSY BREAST  01/1997    benign disease.     BIOPSY BREAST  2012    Benign  Stereotactic R breast Bx [Other][     COLONOSCOPY N/A 09/28/2020    F/U 2030 hyperplastic and scattered diverticulosis     CONIZATION LEEP      9/2001,Loop     LAPAROSCOPIC ABLATION ENDOMETRIOSIS      07/20/2010     TONSILLECTOMY, ADENOIDECTOMY, COMBINED      No Comments Provided       Review of Systems   All other systems reviewed and are negative.       OBJECTIVE:     /76 (BP Location: Right arm, Patient Position: Sitting, Cuff Size: Adult Regular)   Pulse 77   Temp 97.9  F (36.6  C) (Tympanic)   Resp 16   Wt 81.3 kg (179 lb 3.2 oz)   LMP  (LMP Unknown)   SpO2 99%   Breastfeeding No   BMI 30.76 kg/m    Body mass index is 30.76 kg/m .  Physical Exam  HENT:      Right Ear: Tympanic membrane and ear canal normal. There is no impacted cerumen.      Nose: Nose normal.      Mouth/Throat:      Mouth: Mucous membranes are moist.      Pharynx: Oropharynx is clear. No oropharyngeal exudate.   Neck:        Comments: Area of tenderness without discrete nodule.  No palpable lymphadenopathy.  Thyroid appears symmetric and mobile.  Musculoskeletal:      Cervical back: Normal range of motion. Tenderness present. No rigidity.   Lymphadenopathy:      Cervical: No cervical adenopathy.             ASSESSMENT/PLAN:           ICD-10-CM    1. Anterior neck pain  M54.2 US Thyroid     Very specific  area of tenderness lateral to the right thyroid gland.  No palpable mass or lymph node in this area.  Been present for over 3 weeks.  Recommend proceeding with ultrasound.  We will contact her when results are available.    Consider ENT referral if normal ultrasound      Aminta Smalls MD  [facility] AND [facility]

## 2021-07-30 ASSESSMENT — ANXIETY QUESTIONNAIRES: GAD7 TOTAL SCORE: 0

## 2021-07-30 ASSESSMENT — PATIENT HEALTH QUESTIONNAIRE - PHQ9: SUM OF ALL RESPONSES TO PHQ QUESTIONS 1-9: 4

## 2021-08-02 ENCOUNTER — HOSPITAL ENCOUNTER (OUTPATIENT)
Dept: ULTRASOUND IMAGING | Facility: OTHER | Age: 51
Discharge: HOME | End: 2021-08-02
Attending: FAMILY MEDICINE | Admitting: FAMILY MEDICINE
Payer: COMMERCIAL

## 2021-08-02 DIAGNOSIS — M54.2 ANTERIOR NECK PAIN: ICD-10-CM

## 2021-08-02 PROCEDURE — 76536 US EXAM OF HEAD AND NECK: CPT

## 2021-08-03 ENCOUNTER — MYC MEDICAL ADVICE (OUTPATIENT)
Dept: FAMILY MEDICINE | Facility: OTHER | Age: 51
End: 2021-08-03

## 2021-10-09 ENCOUNTER — HEALTH MAINTENANCE LETTER (OUTPATIENT)
Age: 51
End: 2021-10-09

## 2021-12-04 ENCOUNTER — HEALTH MAINTENANCE LETTER (OUTPATIENT)
Age: 51
End: 2021-12-04

## 2021-12-14 ENCOUNTER — HOSPITAL ENCOUNTER (OUTPATIENT)
Dept: MAMMOGRAPHY | Facility: OTHER | Age: 51
Discharge: HOME | End: 2021-12-14
Attending: FAMILY MEDICINE | Admitting: FAMILY MEDICINE
Payer: COMMERCIAL

## 2021-12-14 DIAGNOSIS — Z12.31 VISIT FOR SCREENING MAMMOGRAM: ICD-10-CM

## 2021-12-14 PROCEDURE — 77063 BREAST TOMOSYNTHESIS BI: CPT

## 2022-03-26 ENCOUNTER — HEALTH MAINTENANCE LETTER (OUTPATIENT)
Age: 52
End: 2022-03-26

## 2022-09-17 ENCOUNTER — HEALTH MAINTENANCE LETTER (OUTPATIENT)
Age: 52
End: 2022-09-17

## 2022-10-28 ASSESSMENT — PATIENT HEALTH QUESTIONNAIRE - PHQ9: SUM OF ALL RESPONSES TO PHQ QUESTIONS 1-9: 12

## 2022-11-07 ASSESSMENT — ENCOUNTER SYMPTOMS
HEMATOCHEZIA: 0
HEMATURIA: 0
DYSURIA: 0
PALPITATIONS: 0
NAUSEA: 0
EYE PAIN: 0
COUGH: 1
CONSTIPATION: 0
MYALGIAS: 0
SHORTNESS OF BREATH: 1
SORE THROAT: 0
DIARRHEA: 0
FEVER: 0
PARESTHESIAS: 0
JOINT SWELLING: 0
NERVOUS/ANXIOUS: 0
WEAKNESS: 0
HEARTBURN: 0
ABDOMINAL PAIN: 0
DIZZINESS: 0
BREAST MASS: 0
CHILLS: 0
ARTHRALGIAS: 1
HEADACHES: 0
FREQUENCY: 0

## 2022-11-07 ASSESSMENT — PATIENT HEALTH QUESTIONNAIRE - PHQ9
10. IF YOU CHECKED OFF ANY PROBLEMS, HOW DIFFICULT HAVE THESE PROBLEMS MADE IT FOR YOU TO DO YOUR WORK, TAKE CARE OF THINGS AT HOME, OR GET ALONG WITH OTHER PEOPLE: VERY DIFFICULT
SUM OF ALL RESPONSES TO PHQ QUESTIONS 1-9: 12
SUM OF ALL RESPONSES TO PHQ QUESTIONS 1-9: 12

## 2022-11-11 ASSESSMENT — ENCOUNTER SYMPTOMS
PALPITATIONS: 0
BREAST MASS: 0
CONSTIPATION: 0
DIZZINESS: 0
PARESTHESIAS: 0
MYALGIAS: 0
FEVER: 0
NERVOUS/ANXIOUS: 0
DYSURIA: 0
EYE PAIN: 0
JOINT SWELLING: 0
SHORTNESS OF BREATH: 1
COUGH: 1
HEMATOCHEZIA: 0
HEARTBURN: 0
HEMATURIA: 0
HEADACHES: 0
WEAKNESS: 0
DIARRHEA: 0
FREQUENCY: 0
SORE THROAT: 0
NAUSEA: 0
ABDOMINAL PAIN: 0
CHILLS: 0
ARTHRALGIAS: 1

## 2022-11-11 NOTE — PROGRESS NOTES
SUBJECTIVE:   CC: Sharon is an 52 year old who presents for preventive health visit.     \  Healthy Habits:     Getting at least 3 servings of Calcium per day:  NO    Bi-annual eye exam:  Yes    Dental care twice a year:  Yes    Sleep apnea or symptoms of sleep apnea:  Daytime drowsiness    Diet:  Regular (no restrictions)    Frequency of exercise:  2-3 days/week    Duration of exercise:  Greater than 60 minutes    Taking medications regularly:  Yes    Medication side effects:  None    PHQ-2 Total Score: 4    Here for annual physical.  Does have concerns she would like to discuss as well and below.    Mood:  Patient has longstanding history of depression with some anxiety symptoms for which she had previously been on fluoxetine 40 mg daily.  Reports she tapered her self off of the medication has not been on it for approximately 6 months.  Since then has again noticed increasing and depression, anhedonia, easily irritable, worrying, overthinking.  She has worked with a therapist in the past and would like to consider treatment options going forward    Weight:  Reports she has been continually struggling with difficulty losing weight.  She reports she consumes a fairly healthy diet.  Does have very minimal motivation secondary to her mood to allow her to exercise regularly.    Upper respiratory symptoms:  Patient reports that she was ill with a virus in June, subsequently had COVID in July.  Continue to struggle with on and off chronic cough and shortness of breath and again in October she became ill where symptoms worsened.  Reports that she has 2 sisters who passed away from lung cancer.  Both were smokers.  Patient does have approximately 15-year pack history but did quit 15 to 16 years ago.    Last pap: 03/2018, NIL, HPV-  Cholesterol/DM concerns/screening: Due  Tobacco?: As above  Calcium intake:  dietary  Last mammo: 12/14/2021  Colonoscopy: 09/2020, 10 year follow up  Immunizations: Zoster, COVID-patient  declined    Today's PHQ-2 Score:   PHQ-2 (  [employer]) 2022   Q1: Little interest or pleasure in doing things 3   Q2: Feeling down, depressed or hopeless 1   PHQ-2 Score 4   PHQ-2 Total Score (12-17 Years)- Positive if 3 or more points; Administer PHQ-A if positive -   Q1: Little interest or pleasure in doing things Nearly every day   Q2: Feeling down, depressed or hopeless Several days   PHQ-2 Score 4         Social History     Tobacco Use     Smoking status: Former     Packs/day: 1.00     Years: 10.00     Pack years: 10.00     Types: Cigarettes     Quit date: 2006     Years since quittin.8     Smokeless tobacco: Never   Substance Use Topics     Alcohol use: Yes     Alcohol/week: 2.5 standard drinks     Comment: Alcoholic Drinks/day: occasionally, weekends       Alcohol Use 2022   Prescreen: >3 drinks/day or >7 drinks/week? No       Reviewed orders with patient.  Reviewed health maintenance and updated orders accordingly - Yes      Breast Cancer Screening:  Any new diagnosis of family breast, ovarian, or bowel cancer? No    FHS-7:   Breast CA Risk Assessment (FHS-7) 2021   Did any of your first-degree relatives have breast or ovarian cancer? No   Did any of your relatives have bilateral breast cancer? No   Did any man in your family have breast cancer? No   Did any woman in your family have breast and ovarian cancer? No   Did any woman in your family have breast cancer before age 50 y? No   Do you have 2 or more relatives with breast and/or ovarian cancer? No   Do you have 2 or more relatives with breast and/or bowel cancer? No       Mammogram Screening: Recommended annual mammography  Pertinent mammograms are reviewed under the imaging tab.    History of abnormal Pap smear: Last 3 Pap Results: No results found for: PAP  PAP / HPV Latest Ref Rng & Units 3/5/2018   HPV16 NEG:Negative Negative   HPV18 NEG:Negative Negative   HRHPV NEG:Negative Negative     Reviewed and updated as needed this  visit by clinical staff   Tobacco  Allergies  Meds      Soc Hx        Reviewed and updated as needed this visit by Provider                 Past Medical History:   Diagnosis Date     Dysplasia of cervix uteri     ,abnormal Paps, treated by loop excision.     Lichen sclerosus et atrophicus     ,external genital biopsy     Low back pain     Intermittent     Other premature depolarization     No Comments Provided     Phlebitis and thrombophlebitis of other deep vessels of unspecified lower extremity (H)     2010      Past Surgical History:   Procedure Laterality Date     BIOPSY BREAST  1997    benign disease.     BIOPSY BREAST      Benign  Stereotactic R breast Bx [Other][     COLONOSCOPY N/A 2020    F/U  hyperplastic and scattered diverticulosis     CONIZATION LEEP      2001,Loop     LAPAROSCOPIC ABLATION ENDOMETRIOSIS      2010     TONSILLECTOMY, ADENOIDECTOMY, COMBINED      No Comments Provided     OB History    Para Term  AB Living   2 2 2 0 0 2   SAB IAB Ectopic Multiple Live Births   0 0 0 0 0       Review of Systems   Constitutional: Negative for chills and fever.   HENT: Negative for congestion, ear pain, hearing loss and sore throat.    Eyes: Negative for pain and visual disturbance.   Respiratory: Positive for cough and shortness of breath.    Cardiovascular: Negative for chest pain, palpitations and peripheral edema.   Gastrointestinal: Negative for abdominal pain, constipation, diarrhea, heartburn, hematochezia and nausea.   Breasts:  Negative for tenderness, breast mass and discharge.   Genitourinary: Negative for dysuria, frequency, genital sores, hematuria, pelvic pain, urgency, vaginal bleeding and vaginal discharge.   Musculoskeletal: Positive for arthralgias. Negative for joint swelling and myalgias.   Skin: Negative for rash.   Neurological: Negative for dizziness, weakness, headaches and paresthesias.   Psychiatric/Behavioral: Positive for mood  "changes. The patient is not nervous/anxious.           OBJECTIVE:   /74   Pulse 105   Temp (!) 96.4  F (35.8  C)   Resp 12   Ht 1.651 m (5' 5\")   Wt 87.3 kg (192 lb 6.4 oz)   LMP  (LMP Unknown)   SpO2 98%   Breastfeeding No   BMI 32.02 kg/m    Physical Exam  GENERAL APPEARANCE: healthy, alert and no distress  EYES: Eyes grossly normal to inspection, PERRL and conjunctivae and sclerae normal  HENT: ear canals and TM's normal, nose and mouth without ulcers or lesions, oropharynx clear and oral mucous membranes moist  NECK: no adenopathy, no asymmetry, masses, or scars and thyroid normal to palpation  RESP: lungs clear to auscultation - no rales, rhonchi or wheezes  BREAST: normal without masses, tenderness or nipple discharge and no palpable axillary masses or adenopathy  CV: regular rate and rhythm, normal S1 S2, no S3 or S4, no murmur, click or rub, no peripheral edema and peripheral pulses strong  ABDOMEN: soft, nontender, no hepatosplenomegaly, no masses and bowel sounds normal  MS: no musculoskeletal defects are noted and gait is age appropriate without ataxia  SKIN: no suspicious lesions or rashes  NEURO: Normal strength and tone, sensory exam grossly normal, mentation intact and speech normal  PSYCH: mentation appears normal and affect normal/bright    Diagnostic Test Results:  Labs reviewed in Epic    ASSESSMENT/PLAN:       ICD-10-CM    1. Routine history and physical examination of adult  Z00.00 CBC and Differential     Basic Metabolic Panel     Hemoglobin A1c     Lipid Panel     TSH Reflex GH     CBC and Differential     Basic Metabolic Panel     Hemoglobin A1c     Lipid Panel     TSH Reflex GH      2. Major depressive disorder, recurrent episode, mild (H)  F33.0 sertraline (ZOLOFT) 25 MG tablet      3. Anxiety, generalized  F41.1 sertraline (ZOLOFT) 25 MG tablet      4. Class 1 obesity without serious comorbidity with body mass index (BMI) of 32.0 to 32.9 in adult, unspecified obesity type  " "E66.9 Hemoglobin A1c    Z68.32 Lipid Panel     TSH Reflex GH     Hemoglobin A1c     Lipid Panel     TSH Reflex GH      5. Chronic cough  R05.3 XR Chest 2 Views      6. Shortness of breath  R06.02 XR Chest 2 Views      7. Encounter for screening mammogram for breast cancer  Z12.31 MA Screen Bilateral w/Charanjit        1.  Preventative exams updated as below, will be due for Pap smear in 2023.  Basic lab work pending as above, will notify with results.  Encourage healthy lifestyle.    2, 3.  Continues to struggle with depression and some mild anxiety symptoms.  Discussed options including therapy under medications.  Ultimately decided on trying low-dose Zoloft as above due to some improvement but not fully improved with Prozac in the past.  Start with 25 mg daily and increase to 50 in 1 to 2 weeks.  Follow-up for recheck in 4 weeks.    4.  Lab work pending as above, will notify with results and treat if indicated.  Did discuss medical weight management referral for which patient would like to contact her insurance about first.  She will notify provider if she would like referral placed.    5, 6.  Discussed with patient that she may be struggling with recurrent viral infections, post-COVID syndrome, underlying breathing disorder, malignancy, etc.  Vitals and exam stable.  Chest x-ray unremarkable.  Reassurance given.  Follow-up if symptoms persist or worsen.    7.  Screening mammogram order placed as above.    Patient has been advised of split billing requirements and indicates understanding: Yes      COUNSELING:  Reviewed preventive health counseling, as reflected in patient instructions    Estimated body mass index is 32.02 kg/m  as calculated from the following:    Height as of this encounter: 1.651 m (5' 5\").    Weight as of this encounter: 87.3 kg (192 lb 6.4 oz).    Weight management plan: Discussed healthy diet and exercise guidelines    She reports that she quit smoking about 16 years ago. Her smoking use included " cigarettes. She has a 10.00 pack-year smoking history. She has never used smokeless tobacco.      Counseling Resources:  ATP IV Guidelines  Pooled Cohorts Equation Calculator  Breast Cancer Risk Calculator  BRCA-Related Cancer Risk Assessment: FHS-7 Tool  FRAX Risk Assessment  ICSI Preventive Guidelines  Dietary Guidelines for Americans, 2010  USDA's MyPlate  ASA Prophylaxis  Lung CA Screening    Jana Carvalho PA-C  [facility]  Answers for HPI/ROS submitted by the patient on 11/7/2022  If you checked off any problems, how difficult have these problems made it for you to do your work, take care of things at home, or get along with other people?: Very difficult  PHQ9 TOTAL SCORE: 12    Conflicting answers have been found for some questions. Please document the patient's answers manually.

## 2022-11-14 ENCOUNTER — HOSPITAL ENCOUNTER (OUTPATIENT)
Dept: GENERAL RADIOLOGY | Facility: OTHER | Age: 52
Discharge: HOME | End: 2022-11-14
Attending: PHYSICIAN ASSISTANT
Payer: COMMERCIAL

## 2022-11-14 ENCOUNTER — OFFICE VISIT (OUTPATIENT)
Dept: FAMILY MEDICINE | Facility: OTHER | Age: 52
End: 2022-11-14
Attending: PHYSICIAN ASSISTANT
Payer: COMMERCIAL

## 2022-11-14 VITALS
OXYGEN SATURATION: 98 % | TEMPERATURE: 96.4 F | RESPIRATION RATE: 12 BRPM | WEIGHT: 192.4 LBS | HEART RATE: 105 BPM | BODY MASS INDEX: 32.06 KG/M2 | DIASTOLIC BLOOD PRESSURE: 74 MMHG | SYSTOLIC BLOOD PRESSURE: 112 MMHG | HEIGHT: 65 IN

## 2022-11-14 DIAGNOSIS — R06.02 SHORTNESS OF BREATH: ICD-10-CM

## 2022-11-14 DIAGNOSIS — R05.3 CHRONIC COUGH: ICD-10-CM

## 2022-11-14 DIAGNOSIS — F41.1 ANXIETY, GENERALIZED: ICD-10-CM

## 2022-11-14 DIAGNOSIS — Z12.31 ENCOUNTER FOR SCREENING MAMMOGRAM FOR BREAST CANCER: ICD-10-CM

## 2022-11-14 DIAGNOSIS — E66.811 CLASS 1 OBESITY WITHOUT SERIOUS COMORBIDITY WITH BODY MASS INDEX (BMI) OF 32.0 TO 32.9 IN ADULT, UNSPECIFIED OBESITY TYPE: ICD-10-CM

## 2022-11-14 DIAGNOSIS — F33.0 MAJOR DEPRESSIVE DISORDER, RECURRENT EPISODE, MILD (H): ICD-10-CM

## 2022-11-14 DIAGNOSIS — Z00.00 ROUTINE HISTORY AND PHYSICAL EXAMINATION OF ADULT: Primary | ICD-10-CM

## 2022-11-14 LAB
ANION GAP SERPL CALCULATED.3IONS-SCNC: 11 MMOL/L (ref 7–15)
BASOPHILS # BLD AUTO: 0.1 10E3/UL (ref 0–0.2)
BASOPHILS NFR BLD AUTO: 1 %
BUN SERPL-MCNC: 10.4 MG/DL (ref 6–20)
CALCIUM SERPL-MCNC: 9.3 MG/DL (ref 8.6–10)
CHLORIDE SERPL-SCNC: 106 MMOL/L (ref 98–107)
CHOLEST SERPL-MCNC: 206 MG/DL
CREAT SERPL-MCNC: 0.88 MG/DL (ref 0.51–0.95)
DEPRECATED HCO3 PLAS-SCNC: 26 MMOL/L (ref 22–29)
EOSINOPHIL # BLD AUTO: 0.1 10E3/UL (ref 0–0.7)
EOSINOPHIL NFR BLD AUTO: 2 %
ERYTHROCYTE [DISTWIDTH] IN BLOOD BY AUTOMATED COUNT: 13.8 % (ref 10–15)
GFR SERPL CREATININE-BSD FRML MDRD: 78 ML/MIN/1.73M2
GLUCOSE SERPL-MCNC: 95 MG/DL (ref 70–99)
HBA1C MFR BLD: 4.9 % (ref 4–6.2)
HCT VFR BLD AUTO: 41.9 % (ref 35–47)
HDLC SERPL-MCNC: 70 MG/DL
HGB BLD-MCNC: 13.5 G/DL (ref 11.7–15.7)
IMM GRANULOCYTES # BLD: 0 10E3/UL
IMM GRANULOCYTES NFR BLD: 0 %
LDLC SERPL CALC-MCNC: 124 MG/DL
LYMPHOCYTES # BLD AUTO: 3 10E3/UL (ref 0.8–5.3)
LYMPHOCYTES NFR BLD AUTO: 42 %
MCH RBC QN AUTO: 28.8 PG (ref 26.5–33)
MCHC RBC AUTO-ENTMCNC: 32.2 G/DL (ref 31.5–36.5)
MCV RBC AUTO: 89 FL (ref 78–100)
MONOCYTES # BLD AUTO: 0.7 10E3/UL (ref 0–1.3)
MONOCYTES NFR BLD AUTO: 9 %
NEUTROPHILS # BLD AUTO: 3.2 10E3/UL (ref 1.6–8.3)
NEUTROPHILS NFR BLD AUTO: 46 %
NONHDLC SERPL-MCNC: 136 MG/DL
NRBC # BLD AUTO: 0 10E3/UL
NRBC BLD AUTO-RTO: 0 /100
PLATELET # BLD AUTO: 334 10E3/UL (ref 150–450)
POTASSIUM SERPL-SCNC: 4.7 MMOL/L (ref 3.4–5.3)
RBC # BLD AUTO: 4.69 10E6/UL (ref 3.8–5.2)
SODIUM SERPL-SCNC: 143 MMOL/L (ref 136–145)
TRIGL SERPL-MCNC: 60 MG/DL
TSH SERPL DL<=0.005 MIU/L-ACNC: 1.43 UIU/ML (ref 0.3–4.2)
WBC # BLD AUTO: 7.1 10E3/UL (ref 4–11)

## 2022-11-14 PROCEDURE — 82310 ASSAY OF CALCIUM: CPT | Mod: ZL | Performed by: PHYSICIAN ASSISTANT

## 2022-11-14 PROCEDURE — 83036 HEMOGLOBIN GLYCOSYLATED A1C: CPT | Mod: ZL | Performed by: PHYSICIAN ASSISTANT

## 2022-11-14 PROCEDURE — 84443 ASSAY THYROID STIM HORMONE: CPT | Mod: ZL | Performed by: PHYSICIAN ASSISTANT

## 2022-11-14 PROCEDURE — 99396 PREV VISIT EST AGE 40-64: CPT | Performed by: PHYSICIAN ASSISTANT

## 2022-11-14 PROCEDURE — 36415 COLL VENOUS BLD VENIPUNCTURE: CPT | Mod: ZL | Performed by: PHYSICIAN ASSISTANT

## 2022-11-14 PROCEDURE — 71046 X-RAY EXAM CHEST 2 VIEWS: CPT

## 2022-11-14 PROCEDURE — 82947 ASSAY GLUCOSE BLOOD QUANT: CPT | Mod: ZL | Performed by: PHYSICIAN ASSISTANT

## 2022-11-14 PROCEDURE — 99213 OFFICE O/P EST LOW 20 MIN: CPT | Mod: 25 | Performed by: PHYSICIAN ASSISTANT

## 2022-11-14 PROCEDURE — 85025 COMPLETE CBC W/AUTO DIFF WBC: CPT | Mod: ZL | Performed by: PHYSICIAN ASSISTANT

## 2022-11-14 PROCEDURE — 82374 ASSAY BLOOD CARBON DIOXIDE: CPT | Mod: ZL | Performed by: PHYSICIAN ASSISTANT

## 2022-11-14 PROCEDURE — 80061 LIPID PANEL: CPT | Mod: ZL | Performed by: PHYSICIAN ASSISTANT

## 2022-11-14 RX ORDER — SERTRALINE HYDROCHLORIDE 25 MG/1
50 TABLET, FILM COATED ORAL DAILY
Qty: 60 TABLET | Refills: 3 | Status: SHIPPED | OUTPATIENT
Start: 2022-11-14 | End: 2023-05-09

## 2022-11-14 RX ORDER — FLUOXETINE 40 MG/1
40 CAPSULE ORAL DAILY
Qty: 90 CAPSULE | Refills: 3 | Status: CANCELLED | OUTPATIENT
Start: 2022-11-14

## 2022-11-14 ASSESSMENT — PAIN SCALES - GENERAL: PAINLEVEL: NO PAIN (0)

## 2022-11-14 NOTE — NURSING NOTE
Patient presents to clinic for annual physical, discuss weight gain.  Joanna Dumont LPN ....................  11/14/2022   10:22 AM

## 2023-01-11 ENCOUNTER — HOSPITAL ENCOUNTER (OUTPATIENT)
Dept: MAMMOGRAPHY | Facility: OTHER | Age: 53
Discharge: HOME | End: 2023-01-11
Attending: PHYSICIAN ASSISTANT | Admitting: PHYSICIAN ASSISTANT
Payer: COMMERCIAL

## 2023-01-11 DIAGNOSIS — Z12.31 ENCOUNTER FOR SCREENING MAMMOGRAM FOR BREAST CANCER: ICD-10-CM

## 2023-01-11 PROCEDURE — 77067 SCR MAMMO BI INCL CAD: CPT

## 2023-02-01 ENCOUNTER — TELEPHONE (OUTPATIENT)
Dept: FAMILY MEDICINE | Facility: OTHER | Age: 53
End: 2023-02-01
Payer: COMMERCIAL

## 2023-02-01 DIAGNOSIS — Z87.898 HISTORY OF MOTION SICKNESS: Primary | ICD-10-CM

## 2023-02-01 RX ORDER — SCOLOPAMINE TRANSDERMAL SYSTEM 1 MG/1
1 PATCH, EXTENDED RELEASE TRANSDERMAL
Qty: 4 PATCH | Refills: 0 | Status: SHIPPED | OUTPATIENT
Start: 2023-02-01 | End: 2023-08-11

## 2023-02-01 NOTE — TELEPHONE ENCOUNTER
Patient is wondering if she could get the prescription for the motion sickness patch (Scopolamine) or if there is something stronger that [facility] or [facility] would recommend. She is leaving for her trip on 2/18/2023.     Pharmacy: [facility] on 2/1/2023 at 8:36 AM

## 2023-03-27 ENCOUNTER — TELEPHONE (OUTPATIENT)
Dept: INTERNAL MEDICINE | Facility: OTHER | Age: 53
End: 2023-03-27

## 2023-03-27 ENCOUNTER — HOSPITAL ENCOUNTER (OUTPATIENT)
Dept: ULTRASOUND IMAGING | Facility: OTHER | Age: 53
Discharge: HOME | End: 2023-03-27
Payer: COMMERCIAL

## 2023-03-27 ENCOUNTER — OFFICE VISIT (OUTPATIENT)
Dept: INTERNAL MEDICINE | Facility: OTHER | Age: 53
End: 2023-03-27
Payer: COMMERCIAL

## 2023-03-27 VITALS
DIASTOLIC BLOOD PRESSURE: 72 MMHG | WEIGHT: 194.13 LBS | RESPIRATION RATE: 20 BRPM | HEART RATE: 104 BPM | BODY MASS INDEX: 32.3 KG/M2 | OXYGEN SATURATION: 95 % | TEMPERATURE: 97.1 F | SYSTOLIC BLOOD PRESSURE: 120 MMHG

## 2023-03-27 DIAGNOSIS — I82.412 ACUTE DEEP VEIN THROMBOSIS (DVT) OF FEMORAL VEIN OF LEFT LOWER EXTREMITY (H): Primary | ICD-10-CM

## 2023-03-27 LAB
ALBUMIN SERPL BCG-MCNC: 4.1 G/DL (ref 3.5–5.2)
ALP SERPL-CCNC: 120 U/L (ref 35–104)
ALT SERPL W P-5'-P-CCNC: 13 U/L (ref 10–35)
ANION GAP SERPL CALCULATED.3IONS-SCNC: 9 MMOL/L (ref 7–15)
AST SERPL W P-5'-P-CCNC: 18 U/L (ref 10–35)
BASOPHILS # BLD AUTO: 0.1 10E3/UL (ref 0–0.2)
BASOPHILS NFR BLD AUTO: 1 %
BILIRUB SERPL-MCNC: 0.6 MG/DL
BUN SERPL-MCNC: 9.2 MG/DL (ref 6–20)
CALCIUM SERPL-MCNC: 9.2 MG/DL (ref 8.6–10)
CHLORIDE SERPL-SCNC: 102 MMOL/L (ref 98–107)
CREAT SERPL-MCNC: 0.84 MG/DL (ref 0.51–0.95)
D DIMER PPP FEU-MCNC: 14.64 UG/ML FEU (ref 0–0.5)
DEPRECATED HCO3 PLAS-SCNC: 26 MMOL/L (ref 22–29)
EOSINOPHIL # BLD AUTO: 0.3 10E3/UL (ref 0–0.7)
EOSINOPHIL NFR BLD AUTO: 3 %
ERYTHROCYTE [DISTWIDTH] IN BLOOD BY AUTOMATED COUNT: 16.2 % (ref 10–15)
GFR SERPL CREATININE-BSD FRML MDRD: 82 ML/MIN/1.73M2
GLUCOSE SERPL-MCNC: 90 MG/DL (ref 70–99)
HCT VFR BLD AUTO: 42.5 % (ref 35–47)
HGB BLD-MCNC: 13.9 G/DL (ref 11.7–15.7)
HOLD SPECIMEN: NORMAL
IMM GRANULOCYTES # BLD: 0 10E3/UL
IMM GRANULOCYTES NFR BLD: 0 %
INR PPP: 1.07 (ref 0.85–1.15)
LYMPHOCYTES # BLD AUTO: 3.1 10E3/UL (ref 0.8–5.3)
LYMPHOCYTES NFR BLD AUTO: 31 %
MCH RBC QN AUTO: 27.1 PG (ref 26.5–33)
MCHC RBC AUTO-ENTMCNC: 32.7 G/DL (ref 31.5–36.5)
MCV RBC AUTO: 83 FL (ref 78–100)
MONOCYTES # BLD AUTO: 0.7 10E3/UL (ref 0–1.3)
MONOCYTES NFR BLD AUTO: 7 %
NEUTROPHILS # BLD AUTO: 6 10E3/UL (ref 1.6–8.3)
NEUTROPHILS NFR BLD AUTO: 58 %
NRBC # BLD AUTO: 0 10E3/UL
NRBC BLD AUTO-RTO: 0 /100
PLATELET # BLD AUTO: 236 10E3/UL (ref 150–450)
POTASSIUM SERPL-SCNC: 4.1 MMOL/L (ref 3.4–5.3)
PROT SERPL-MCNC: 6.8 G/DL (ref 6.4–8.3)
RBC # BLD AUTO: 5.13 10E6/UL (ref 3.8–5.2)
SODIUM SERPL-SCNC: 137 MMOL/L (ref 136–145)
WBC # BLD AUTO: 10.2 10E3/UL (ref 4–11)

## 2023-03-27 PROCEDURE — 85025 COMPLETE CBC W/AUTO DIFF WBC: CPT | Mod: ZL

## 2023-03-27 PROCEDURE — 85610 PROTHROMBIN TIME: CPT | Mod: ZL

## 2023-03-27 PROCEDURE — 85379 FIBRIN DEGRADATION QUANT: CPT | Mod: ZL

## 2023-03-27 PROCEDURE — 80053 COMPREHEN METABOLIC PANEL: CPT | Mod: ZL

## 2023-03-27 PROCEDURE — 36415 COLL VENOUS BLD VENIPUNCTURE: CPT | Mod: ZL

## 2023-03-27 PROCEDURE — 99214 OFFICE O/P EST MOD 30 MIN: CPT

## 2023-03-27 PROCEDURE — 93971 EXTREMITY STUDY: CPT | Mod: LT

## 2023-03-27 ASSESSMENT — ENCOUNTER SYMPTOMS
MYALGIAS: 1
PARESTHESIAS: 0
COLOR CHANGE: 0
WEAKNESS: 0
CHILLS: 0
PALPITATIONS: 0
COUGH: 0
FEVER: 0
SHORTNESS OF BREATH: 1
NUMBNESS: 0
WOUND: 0

## 2023-03-27 ASSESSMENT — PAIN SCALES - GENERAL: PAINLEVEL: MODERATE PAIN (5)

## 2023-03-27 ASSESSMENT — ANXIETY QUESTIONNAIRES
IF YOU CHECKED OFF ANY PROBLEMS ON THIS QUESTIONNAIRE, HOW DIFFICULT HAVE THESE PROBLEMS MADE IT FOR YOU TO DO YOUR WORK, TAKE CARE OF THINGS AT HOME, OR GET ALONG WITH OTHER PEOPLE: VERY DIFFICULT
4. TROUBLE RELAXING: SEVERAL DAYS
8. IF YOU CHECKED OFF ANY PROBLEMS, HOW DIFFICULT HAVE THESE MADE IT FOR YOU TO DO YOUR WORK, TAKE CARE OF THINGS AT HOME, OR GET ALONG WITH OTHER PEOPLE?: VERY DIFFICULT
1. FEELING NERVOUS, ANXIOUS, OR ON EDGE: MORE THAN HALF THE DAYS
7. FEELING AFRAID AS IF SOMETHING AWFUL MIGHT HAPPEN: SEVERAL DAYS
2. NOT BEING ABLE TO STOP OR CONTROL WORRYING: MORE THAN HALF THE DAYS
5. BEING SO RESTLESS THAT IT IS HARD TO SIT STILL: NOT AT ALL
6. BECOMING EASILY ANNOYED OR IRRITABLE: MORE THAN HALF THE DAYS
GAD7 TOTAL SCORE: 10
7. FEELING AFRAID AS IF SOMETHING AWFUL MIGHT HAPPEN: SEVERAL DAYS
GAD7 TOTAL SCORE: 10
GAD7 TOTAL SCORE: 10
3. WORRYING TOO MUCH ABOUT DIFFERENT THINGS: MORE THAN HALF THE DAYS

## 2023-03-27 ASSESSMENT — PATIENT HEALTH QUESTIONNAIRE - PHQ9
SUM OF ALL RESPONSES TO PHQ QUESTIONS 1-9: 10
10. IF YOU CHECKED OFF ANY PROBLEMS, HOW DIFFICULT HAVE THESE PROBLEMS MADE IT FOR YOU TO DO YOUR WORK, TAKE CARE OF THINGS AT HOME, OR GET ALONG WITH OTHER PEOPLE: SOMEWHAT DIFFICULT
SUM OF ALL RESPONSES TO PHQ QUESTIONS 1-9: 10

## 2023-03-27 NOTE — TELEPHONE ENCOUNTER
Regina from [facility] pharmacy called regarding prescription sent over today. It is looking like it may need a PA. Please call.    Mona Ma on 3/27/2023 at 1:35 PM

## 2023-03-27 NOTE — PATIENT INSTRUCTIONS
Start Eliquis 10 mg twice daily for 7 days.  Then take 5 mg Eliquis twice daily for 3 to 6 months.  Follow-up with hematology in 3 months.  Referral has been placed.          Results for orders placed or performed in visit on 03/27/23   US Lower Extremity Venous Duplex Left     Status: None    Narrative    US LOWER EXTREMITY VENOUS DUPLEX LEFT  3/27/2023 11:41 AM    History:Female, age 52 years; ; Rule out DVT- positive Homans sign;  Left leg pain    Comparison: 12/17/2018    Technique: Grayscale and color Doppler ultrasound of the left lower  extremity deep venous structures.    Findings:   There is occlusive thrombus in the distal femoral vein as well as  within the popliteal and posterior tibial veins.      Impression    Impression:  Positive for DVT.     Occlusive thrombus in the distal femoral vein as well as throughout  the popliteal vein into the posterior tibialis veins.    MARAH POWELL MD         SYSTEM ID:  [id number]

## 2023-03-27 NOTE — PROGRESS NOTES
Assessment & Plan   Johanne Fish is a 52 year old presenting for the following health issues:      ICD-10-CM    1. Acute deep vein thrombosis (DVT) of femoral vein of left lower extremity (H)  I82.412 US Lower Extremity Venous Duplex Left     US Lower Extremity Venous Duplex Left     CBC and Differential     Comprehensive Metabolic Panel     D dimer quantitative     INR     Adult Oncology/Hematology  Referral     apixaban ANTICOAGULANT (ELIQUIS) 5 MG tablet     CBC and Differential     Comprehensive Metabolic Panel     D dimer quantitative     INR        Ultrasound was positive for DVT, occlusive thrombus in the distal femoral vein as well as throughout the popliteal vein into the posterior tibialis veins.  Patient reports that she did have a blood clot in the same leg and area around 10 years ago.  She does admit that her father had frequent blood clots, unsure if he had a condition that caused his blood clotting.  Started patient on 10 mg twice daily of Eliquis for 1 week, then 5 mg twice daily for likely 6 months.  Instructed her to follow-up with hematology in 3 months to have further coagulation work-up done.  CBC, CMP, INR stable.  D-dimer is elevated at 14.64.  Instructed patient that if she develops shortness of breath or chest pain to present to the ER immediately.      No follow-ups on file.    ANNIKA Muñiz [facility]      Subjective   Sharon is a 52 year old, presenting for the following health issues:  Pain (5/10 pain in left calf since Friday evening. Previous DVT   Lata Pinto LPN on 3/27/2023 at 10:54 AM//)  No flowsheet data found.  Pain  This is a new problem. The current episode started in the past 7 days. The problem occurs constantly. The problem has been unchanged. Associated symptoms include myalgias ( left lower calf). Pertinent negatives include no chest pain, chills, coughing, fever, numbness, rash or weakness. The symptoms are  aggravated by standing, walking and exertion. She has tried rest, relaxation and lying down for the symptoms.   History of Present Illness       Reason for visit:  Left calf pain  Symptom onset:  1-3 days ago  Symptoms include:  Left calf pain  Symptom intensity:  Severe  Symptom progression:  Staying the same    She eats 2-3 servings of fruits and vegetables daily.She consumes 0 sweetened beverage(s) daily.She exercises with enough effort to increase her heart rate 9 or less minutes per day.  She exercises with enough effort to increase her heart rate 3 or less days per week.   She is taking medications regularly.    Today's PHQ-9         PHQ-9 Total Score: 10    PHQ-9 Q9 Thoughts of better off dead/self-harm past 2 weeks :   Not at all    How difficult have these problems made it for you to do your work, take care of things at home, or get along with other people: Somewhat difficult  Today's ANDREA-7 Score: 10               Review of Systems   Constitutional: Negative for chills and fever.   Respiratory: Positive for shortness of breath ( occassional- has been chronic). Negative for cough.    Cardiovascular: Negative for chest pain and palpitations.   Musculoskeletal: Positive for myalgias ( left lower calf).   Skin: Negative for color change, rash and wound.   Neurological: Negative for weakness, numbness and paresthesias.   All other systems reviewed and are negative.           Objective    /72 (BP Location: Right arm, Patient Position: Sitting, Cuff Size: Adult Regular)   Pulse 104   Temp 97.1  F (36.2  C)   Resp 20   Wt 88.1 kg (194 lb 2 oz)   LMP  (LMP Unknown)   SpO2 95%   BMI 32.30 kg/m    Body mass index is 32.3 kg/m .  Physical Exam  Vitals reviewed.   Constitutional:       General: She is not in acute distress.     Appearance: Normal appearance. She is not toxic-appearing.   Cardiovascular:      Rate and Rhythm: Normal rate and regular rhythm.      Pulses: Normal pulses.      Heart sounds:  Normal heart sounds. No murmur heard.  Musculoskeletal:      Left lower leg: Tenderness present.      Comments: Positive Arthur's sign on left leg    Varicose veins noted on lower legs     Neurological:      Mental Status: She is alert.   Psychiatric:         Mood and Affect: Mood normal.         Behavior: Behavior normal.          Results for orders placed or performed in visit on 03/27/23   US Lower Extremity Venous Duplex Left     Status: None    Narrative    US LOWER EXTREMITY VENOUS DUPLEX LEFT  3/27/2023 11:41 AM    History:Female, age 52 years; ; Rule out DVT- positive Homans sign;  Left leg pain    Comparison: 12/17/2018    Technique: Grayscale and color Doppler ultrasound of the left lower  extremity deep venous structures.    Findings:   There is occlusive thrombus in the distal femoral vein as well as  within the popliteal and posterior tibial veins.      Impression    Impression:  Positive for DVT.     Occlusive thrombus in the distal femoral vein as well as throughout  the popliteal vein into the posterior tibialis veins.    MARAH POWELL MD         SYSTEM ID:  [id number]   Comprehensive Metabolic Panel     Status: Abnormal   Result Value Ref Range    Sodium 137 136 - 145 mmol/L    Potassium 4.1 3.4 - 5.3 mmol/L    Chloride 102 98 - 107 mmol/L    Carbon Dioxide (CO2) 26 22 - 29 mmol/L    Anion Gap 9 7 - 15 mmol/L    Urea Nitrogen 9.2 6.0 - 20.0 mg/dL    Creatinine 0.84 0.51 - 0.95 mg/dL    Calcium 9.2 8.6 - 10.0 mg/dL    Glucose 90 70 - 99 mg/dL    Alkaline Phosphatase 120 (H) 35 - 104 U/L    AST 18 10 - 35 U/L    ALT 13 10 - 35 U/L    Protein Total 6.8 6.4 - 8.3 g/dL    Albumin 4.1 3.5 - 5.2 g/dL    Bilirubin Total 0.6 <=1.2 mg/dL    GFR Estimate 82 >60 mL/min/1.73m2   D dimer quantitative     Status: Abnormal   Result Value Ref Range    D-Dimer Quantitative 14.64 (H) 0.00 - 0.50 ug/mL FEU    Narrative    This D-dimer assay is intended for use in conjunction with a clinical pretest probability  assessment model to exclude pulmonary embolism (PE) and deep venous thrombosis (DVT) in outpatients suspected of PE or DVT. The cut-off value is 0.50 ug/mL FEU.   INR     Status: Normal   Result Value Ref Range    INR 1.07 0.85 - 1.15   Extra Tube     Status: None    Narrative    The following orders were created for panel order Extra Tube.  Procedure                               Abnormality         Status                     ---------                               -----------         ------                     Extra Serum Separator Tu...[[id number]]                      Final result                 Please view results for these tests on the individual orders.   CBC with platelets and differential     Status: Abnormal   Result Value Ref Range    WBC Count 10.2 4.0 - 11.0 10e3/uL    RBC Count 5.13 3.80 - 5.20 10e6/uL    Hemoglobin 13.9 11.7 - 15.7 g/dL    Hematocrit 42.5 35.0 - 47.0 %    MCV 83 78 - 100 fL    MCH 27.1 26.5 - 33.0 pg    MCHC 32.7 31.5 - 36.5 g/dL    RDW 16.2 (H) 10.0 - 15.0 %    Platelet Count 236 150 - 450 10e3/uL    % Neutrophils 58 %    % Lymphocytes 31 %    % Monocytes 7 %    % Eosinophils 3 %    % Basophils 1 %    % Immature Granulocytes 0 %    NRBCs per 100 WBC 0 <1 /100    Absolute Neutrophils 6.0 1.6 - 8.3 10e3/uL    Absolute Lymphocytes 3.1 0.8 - 5.3 10e3/uL    Absolute Monocytes 0.7 0.0 - 1.3 10e3/uL    Absolute Eosinophils 0.3 0.0 - 0.7 10e3/uL    Absolute Basophils 0.1 0.0 - 0.2 10e3/uL    Absolute Immature Granulocytes 0.0 <=0.4 10e3/uL    Absolute NRBCs 0.0 10e3/uL   Extra Serum Separator Tube (SST)     Status: None   Result Value Ref Range    Hold Specimen JIC    CBC and Differential     Status: Abnormal    Narrative    The following orders were created for panel order CBC and Differential.  Procedure                               Abnormality         Status                     ---------                               -----------         ------                     CBC with platelets and  d...[[id number]]  Abnormal            Final result                 Please view results for these tests on the individual orders.

## 2023-03-30 ENCOUNTER — TELEPHONE (OUTPATIENT)
Dept: ONCOLOGY | Facility: OTHER | Age: 53
End: 2023-03-30
Payer: COMMERCIAL

## 2023-03-30 NOTE — TELEPHONE ENCOUNTER
Oncology/Hematology Care Coordination - Referral Review    Referred by:  Jana ROBLERO CNP    Diagnosis:  Acute DVT of femoral vein of left lower extremity    Imaging:  3/27/23 U/S left lower extremity    Lab:  3/27/23    Note:  From 3/27/23 office visit - Ultrasound was positive for DVT, occlusive thrombus in the distal femoral vein as well as throughout the popliteal vein into the posterior tibialis veins.  Patient reports that she did have a blood clot in the same leg and area around 10 years ago.  She does admit that her father had frequent blood clots, unsure if he had a condition that caused his blood clotting.  Started patient on 10 mg twice daily of Eliquis for 1 week, then 5 mg twice daily for likely 6 months.  Instructed her to follow-up with hematology in 3 months to have further coagulation work-up done.     Hawa Sanchez CMA (MA).....................3/30/2023  2:50 PM

## 2023-03-31 ENCOUNTER — NURSE TRIAGE (OUTPATIENT)
Dept: FAMILY MEDICINE | Facility: OTHER | Age: 53
End: 2023-03-31
Payer: COMMERCIAL

## 2023-03-31 NOTE — TELEPHONE ENCOUNTER
Called and informed patient and patient verbalized understanding.  Patient states she will try to get into ED.    Patient states that her shortness of breath has been addressed at previous appointment and possibly related to her weight and patient possibly going to see Dr. Santiago.  Patient states she would still like to keep appointment on Monday with Jana Carvalho to discuss above.    Sunitha Feliciano RN on 3/31/2023 at 1:06 PM

## 2023-03-31 NOTE — TELEPHONE ENCOUNTER
"S-(situation): called and spoke with patient in regards to appointment she has on Monday with Jana Carvalho for shortness of breath    B-(background): patient had OV on 3/27/23 with Jana Ahn and was diagnosed with a DVT in left leg and has been started on Eliquis and shortness of breath was discussed then    A-(assessment): patient states she has been short of breath for the last 2-3 months but has been worse over the last month.  States with any activity seems short of breath.  States even with talking will get short of breath.  Patient states her fit bit states her pulse is 106 while sitting, unsure of oxygen.  Denies any chest pain.    R-(recommendations): informed patient to present to ED and patient states \" I probably wont do that\"  Informed patient why she should present to ED .  Informed patient will route message to Jana Carvalho for review and consideration.  Sunitha Feliciano RN on 3/31/2023 at 11:50 AM      Reason for Disposition    MODERATE difficulty breathing (e.g., speaks in phrases, SOB even at rest, pulse 100-120) of new-onset or worse than normal    Additional Information    Negative: SEVERE difficulty breathing (e.g., struggling for each breath, speaks in single words, pulse > 120)    Negative: Breathing stopped and hasn't returned    Negative: Choking on something    Negative: Bluish (or gray) lips or face    Negative: Difficult to awaken or acting confused (e.g., disoriented, slurred speech)    Negative: Passed out (i.e., fainted, collapsed and was not responding)    Negative: Wheezing started suddenly after medicine, an allergic food, or bee sting    Negative: Stridor    Negative: Slow, shallow and weak breathing    Negative: Sounds like a life-threatening emergency to the triager    Negative: Chest pain    Negative: Wheezing (high pitched whistling sound) and previous asthma attacks or use of asthma medicines    Negative: Difficulty breathing and within 14 days of COVID-19 Exposure    " "Negative: Difficulty breathing and only present when coughing    Negative: Difficulty breathing and only from stuffy nose    Negative: Difficulty breathing and only from stuffy nose or runny nose from common cold    Answer Assessment - Initial Assessment Questions  1. RESPIRATORY STATUS: \"Describe your breathing?\" (e.g., wheezing, shortness of breath, unable to speak, severe coughing)       Shortness of breath  2. ONSET: \"When did this breathing problem begin?\"       Notice it a 2-3 months ago  3. PATTERN \"Does the difficult breathing come and go, or has it been constant since it started?\"      costant  4. SEVERITY: \"How bad is your breathing?\" (e.g., mild, moderate, severe)     - MILD: No SOB at rest, mild SOB with walking, speaks normally in sentences, can lie down, no retractions, pulse < 100.     - MODERATE: SOB at rest, SOB with minimal exertion and prefers to sit, cannot lie down flat, speaks in phrases, mild retractions, audible wheezing, pulse 100-120.     - SEVERE: Very SOB at rest, speaks in single words, struggling to breathe, sitting hunched forward, retractions, pulse > 120       Mild-moderate  5. RECURRENT SYMPTOM: \"Have you had difficulty breathing before?\" If Yes, ask: \"When was the last time?\" and \"What happened that time?\"       Yes but this seems worse than   6. CARDIAC HISTORY: \"Do you have any history of heart disease?\" (e.g., heart attack, angina, bypass surgery, angioplasty)       Denies, many years ago irregular heart rate  7. LUNG HISTORY: \"Do you have any history of lung disease?\"  (e.g., pulmonary embolus, asthma, emphysema)      denies  8. CAUSE: \"What do you think is causing the breathing problem?\"       unsure  9. OTHER SYMPTOMS: \"Do you have any other symptoms? (e.g., dizziness, runny nose, cough, chest pain, fever)      Little cough,   10. O2 SATURATION MONITOR:  \"Do you use an oxygen saturation monitor (pulse oximeter) at home?\" If Yes, \"What is your reading (oxygen level) today?\" " "\"What is your usual oxygen saturation reading?\" (e.g., 95%)        unsure  11. PREGNANCY: \"Is there any chance you are pregnant?\" \"When was your last menstrual period?\"        denies  12. TRAVEL: \"Have you traveled out of the country in the last month?\" (e.g., travel history, exposures)        denies    Protocols used: BREATHING DIFFICULTY-A-[place]    "

## 2023-03-31 NOTE — PROGRESS NOTES
Assessment & Plan     1. Acute deep vein thrombosis (DVT) of proximal vein of left lower extremity (H)  2. Shortness of breath  Shortness of breath that has been present for couple months, DVT identified last week.  Currently on Eliquis.  We will pursue CT for rule out PE.  Discussed presenting to ED to have this completed stat, patient declined.  Stat order placed and will be scheduled for later today.  Patient is aware to present to the ED if her symptoms change or worsen at all.  Differential also includes asthma, COPD, related to weight, underlying cardiac cause, infection, etc.  - CT Chest Pulmonary Embolism w Contrast; Future    3. Class 1 obesity without serious comorbidity with body mass index (BMI) of 32.0 to 32.9 in adult, unspecified obesity type  Chronic, stable.  Referral to medical weight management.  Continue with healthy lifestyle.  - Adult Comprehensive Weight Management  Referral; Future    No follow-ups on file.    Jana Carvalho PA-C  [facility]    Kiel Herrera is a 52 year old, presenting for the following health issues:  Shortness of Breath  No flowsheet data found.  History of Present Illness       Reason for visit:  Left calf pain  Symptom onset:  1-3 days ago  Symptoms include:  Left calf pain  Symptom intensity:  Severe  Symptom progression:  Staying the same    She eats 2-3 servings of fruits and vegetables daily.She consumes 0 sweetened beverage(s) daily.She exercises with enough effort to increase her heart rate 9 or less minutes per day.  She exercises with enough effort to increase her heart rate 3 or less days per week.   She is taking medications regularly.     Here for follow-up on shortness of breath.  Patient has been struggling with some shortness of breath mainly with exertion for the past couple months.  Did have chest x-ray completed regarding this in November which was unremarkable.  She had concerns that shortness of breath could be  related to her increasing weight.  Medical weight management was discussed however patient declined and was focusing more on management on her own.  She did increase her exercise and got a gym membership but has not noticed much change.  She would be interested in medical weight management referral at this time.  Of note patient was diagnosed with acute DVT last week.  She was started on Eliquis.  Shortness of breath was present prior to this.  No associated fever/chills, cough, wheezing, chest pain or pressure, palpitations, dizziness, syncope, lightheadedness, vision changes.        PAST MEDICAL HISTORY:   Past Medical History:   Diagnosis Date     Dysplasia of cervix uteri     2001,abnormal Paps, treated by loop excision.     Lichen sclerosus et atrophicus     2005,external genital biopsy     Low back pain     Intermittent     Other premature depolarization     No Comments Provided     Phlebitis and thrombophlebitis of other deep vessels of unspecified lower extremity (H)     1/23/2010       PAST SURGICAL HISTORY:   Past Surgical History:   Procedure Laterality Date     BIOPSY BREAST  01/1997    benign disease.     BIOPSY BREAST  2012    Benign  Stereotactic R breast Bx [Other][     COLONOSCOPY N/A 09/28/2020    F/U 2030 hyperplastic and scattered diverticulosis     CONIZATION LEEP      9/2001,Loop     LAPAROSCOPIC ABLATION ENDOMETRIOSIS      07/20/2010     TONSILLECTOMY, ADENOIDECTOMY, COMBINED      No Comments Provided       FAMILY HISTORY:   Family History   Problem Relation Age of Onset     Lung Cancer Mother      Hyperlipidemia Mother      Heart Disease Father         Heart Disease,Congestive heart failure     Ovarian Cancer Sister      Lung Cancer Sister      Lung Cancer Sister      Thyroid Disease Sister      Other - See Comments Sister      Cancer Brother      Breast Cancer Maternal Grandmother         Cancer-breast     Other - See Comments Other        SOCIAL HISTORY:   Social History     Tobacco Use      "Smoking status: Former     Packs/day: 1.00     Years: 10.00     Pack years: 10.00     Types: Cigarettes     Quit date: 2006     Years since quittin.2     Smokeless tobacco: Never   Vaping Use     Vaping status: Never Used   Substance Use Topics     Alcohol use: Yes     Alcohol/week: 2.5 standard drinks of alcohol     Comment: Alcoholic Drinks/day: occasionally, weekends      No Known Allergies  Current Outpatient Medications   Medication     apixaban ANTICOAGULANT (ELIQUIS) 5 MG tablet     clobetasol (TEMOVATE) 0.05 % external cream     scopolamine (TRANSDERM) 1 MG/3DAYS 72 hr patch     sertraline (ZOLOFT) 25 MG tablet     No current facility-administered medications for this visit.       Review of Systems   Per HPI        Objective    /72   Pulse 106   Temp (!) 96.4  F (35.8  C)   Resp 12   Ht 1.651 m (5' 5\")   Wt 88.5 kg (195 lb)   LMP  (LMP Unknown)   SpO2 97%   BMI 32.45 kg/m    Body mass index is 32.45 kg/m .  Physical Exam   General: Pleasant, in no apparent distress.  Cardiovascular: Regular rate and rhythm with S1 equal to S2. No murmurs, friction rubs, or gallops.   Respiratory: Lungs are resonant and clear to auscultation bilaterally. No wheezes, crackles, or rhonchi.  Psych: Appropriate mood and affect.      "

## 2023-03-31 NOTE — TELEPHONE ENCOUNTER
Patient has appointment with [facility] on Monday 4/3/2023 for SOB. Can she be triage? Should she wait till Monday? Thank you and have a great weekend  Joanna Dumont LPN ....................  3/31/2023   10:58 AM

## 2023-04-02 ASSESSMENT — PATIENT HEALTH QUESTIONNAIRE - PHQ9
SUM OF ALL RESPONSES TO PHQ QUESTIONS 1-9: 9
10. IF YOU CHECKED OFF ANY PROBLEMS, HOW DIFFICULT HAVE THESE PROBLEMS MADE IT FOR YOU TO DO YOUR WORK, TAKE CARE OF THINGS AT HOME, OR GET ALONG WITH OTHER PEOPLE: VERY DIFFICULT
SUM OF ALL RESPONSES TO PHQ QUESTIONS 1-9: 9

## 2023-04-03 ENCOUNTER — HOSPITAL ENCOUNTER (OUTPATIENT)
Dept: CT IMAGING | Facility: OTHER | Age: 53
Discharge: HOME | End: 2023-04-03
Attending: PHYSICIAN ASSISTANT
Payer: COMMERCIAL

## 2023-04-03 ENCOUNTER — TELEPHONE (OUTPATIENT)
Dept: FAMILY MEDICINE | Facility: OTHER | Age: 53
End: 2023-04-03

## 2023-04-03 ENCOUNTER — HOSPITAL ENCOUNTER (EMERGENCY)
Facility: OTHER | Age: 53
Discharge: HOME | End: 2023-04-03
Attending: NURSE PRACTITIONER | Admitting: NURSE PRACTITIONER
Payer: COMMERCIAL

## 2023-04-03 ENCOUNTER — OFFICE VISIT (OUTPATIENT)
Dept: FAMILY MEDICINE | Facility: OTHER | Age: 53
End: 2023-04-03
Attending: PHYSICIAN ASSISTANT
Payer: COMMERCIAL

## 2023-04-03 VITALS
OXYGEN SATURATION: 100 % | BODY MASS INDEX: 32.45 KG/M2 | WEIGHT: 195 LBS | RESPIRATION RATE: 10 BRPM | DIASTOLIC BLOOD PRESSURE: 74 MMHG | HEART RATE: 90 BPM | TEMPERATURE: 97.7 F | SYSTOLIC BLOOD PRESSURE: 117 MMHG

## 2023-04-03 VITALS
TEMPERATURE: 96.4 F | HEART RATE: 106 BPM | OXYGEN SATURATION: 97 % | BODY MASS INDEX: 32.49 KG/M2 | DIASTOLIC BLOOD PRESSURE: 72 MMHG | WEIGHT: 195 LBS | RESPIRATION RATE: 12 BRPM | SYSTOLIC BLOOD PRESSURE: 118 MMHG | HEIGHT: 65 IN

## 2023-04-03 DIAGNOSIS — E66.811 CLASS 1 OBESITY WITHOUT SERIOUS COMORBIDITY WITH BODY MASS INDEX (BMI) OF 32.0 TO 32.9 IN ADULT, UNSPECIFIED OBESITY TYPE: ICD-10-CM

## 2023-04-03 DIAGNOSIS — I82.4Y2 ACUTE DEEP VEIN THROMBOSIS (DVT) OF PROXIMAL VEIN OF LEFT LOWER EXTREMITY (H): Primary | ICD-10-CM

## 2023-04-03 DIAGNOSIS — I26.99 PULMONARY EMBOLISM (H): ICD-10-CM

## 2023-04-03 DIAGNOSIS — R06.02 SHORTNESS OF BREATH: ICD-10-CM

## 2023-04-03 DIAGNOSIS — I82.4Y2 ACUTE DEEP VEIN THROMBOSIS (DVT) OF PROXIMAL VEIN OF LEFT LOWER EXTREMITY (H): ICD-10-CM

## 2023-04-03 LAB
ANION GAP SERPL CALCULATED.3IONS-SCNC: 10 MMOL/L (ref 7–15)
BASOPHILS # BLD AUTO: 0.1 10E3/UL (ref 0–0.2)
BASOPHILS NFR BLD AUTO: 1 %
BUN SERPL-MCNC: 13.2 MG/DL (ref 6–20)
CALCIUM SERPL-MCNC: 9.3 MG/DL (ref 8.6–10)
CHLORIDE SERPL-SCNC: 104 MMOL/L (ref 98–107)
CREAT SERPL-MCNC: 0.86 MG/DL (ref 0.51–0.95)
DEPRECATED HCO3 PLAS-SCNC: 25 MMOL/L (ref 22–29)
EOSINOPHIL # BLD AUTO: 0.2 10E3/UL (ref 0–0.7)
EOSINOPHIL NFR BLD AUTO: 3 %
ERYTHROCYTE [DISTWIDTH] IN BLOOD BY AUTOMATED COUNT: 16.3 % (ref 10–15)
GFR SERPL CREATININE-BSD FRML MDRD: 80 ML/MIN/1.73M2
GLUCOSE SERPL-MCNC: 88 MG/DL (ref 70–99)
HCT VFR BLD AUTO: 37.9 % (ref 35–47)
HGB BLD-MCNC: 12.5 G/DL (ref 11.7–15.7)
HOLD SPECIMEN: NORMAL
IMM GRANULOCYTES # BLD: 0 10E3/UL
IMM GRANULOCYTES NFR BLD: 0 %
LYMPHOCYTES # BLD AUTO: 3.5 10E3/UL (ref 0.8–5.3)
LYMPHOCYTES NFR BLD AUTO: 39 %
MCH RBC QN AUTO: 27.8 PG (ref 26.5–33)
MCHC RBC AUTO-ENTMCNC: 33 G/DL (ref 31.5–36.5)
MCV RBC AUTO: 84 FL (ref 78–100)
MONOCYTES # BLD AUTO: 0.6 10E3/UL (ref 0–1.3)
MONOCYTES NFR BLD AUTO: 7 %
NEUTROPHILS # BLD AUTO: 4.7 10E3/UL (ref 1.6–8.3)
NEUTROPHILS NFR BLD AUTO: 50 %
NRBC # BLD AUTO: 0 10E3/UL
NRBC BLD AUTO-RTO: 0 /100
PLATELET # BLD AUTO: 328 10E3/UL (ref 150–450)
POTASSIUM SERPL-SCNC: 4.1 MMOL/L (ref 3.4–5.3)
RBC # BLD AUTO: 4.5 10E6/UL (ref 3.8–5.2)
SODIUM SERPL-SCNC: 139 MMOL/L (ref 136–145)
WBC # BLD AUTO: 9.2 10E3/UL (ref 4–11)

## 2023-04-03 PROCEDURE — 250N000011 HC RX IP 250 OP 636: Performed by: PHYSICIAN ASSISTANT

## 2023-04-03 PROCEDURE — 99213 OFFICE O/P EST LOW 20 MIN: CPT | Performed by: PHYSICIAN ASSISTANT

## 2023-04-03 PROCEDURE — 36415 COLL VENOUS BLD VENIPUNCTURE: CPT | Performed by: NURSE PRACTITIONER

## 2023-04-03 PROCEDURE — 80048 BASIC METABOLIC PNL TOTAL CA: CPT | Performed by: NURSE PRACTITIONER

## 2023-04-03 PROCEDURE — 71275 CT ANGIOGRAPHY CHEST: CPT

## 2023-04-03 PROCEDURE — 99284 EMERGENCY DEPT VISIT MOD MDM: CPT | Performed by: NURSE PRACTITIONER

## 2023-04-03 PROCEDURE — 99283 EMERGENCY DEPT VISIT LOW MDM: CPT | Performed by: NURSE PRACTITIONER

## 2023-04-03 PROCEDURE — 85004 AUTOMATED DIFF WBC COUNT: CPT | Performed by: NURSE PRACTITIONER

## 2023-04-03 RX ORDER — IOPAMIDOL 755 MG/ML
82 INJECTION, SOLUTION INTRAVASCULAR ONCE
Status: COMPLETED | OUTPATIENT
Start: 2023-04-03 | End: 2023-04-03

## 2023-04-03 RX ADMIN — IOPAMIDOL 82 ML: 755 INJECTION, SOLUTION INTRAVENOUS at 15:44

## 2023-04-03 ASSESSMENT — ENCOUNTER SYMPTOMS
NEUROLOGICAL NEGATIVE: 1
CONSTITUTIONAL NEGATIVE: 1
PSYCHIATRIC NEGATIVE: 1
CARDIOVASCULAR NEGATIVE: 1
SHORTNESS OF BREATH: 1
GASTROINTESTINAL NEGATIVE: 1

## 2023-04-03 ASSESSMENT — PAIN SCALES - GENERAL: PAINLEVEL: NO PAIN (0)

## 2023-04-03 ASSESSMENT — ACTIVITIES OF DAILY LIVING (ADL): ADLS_ACUITY_SCORE: 35

## 2023-04-03 NOTE — ED PROVIDER NOTES
History     Chief Complaint   Patient presents with     Pulmonary Embolism     The history is provided by the patient. No  was used.     Johanne Fish is a 52 year old female who presents to the ED for evaluation of a PE. Patient was diagnosed with a DVT on 3/27/2022. She was started on Eliquis. Patient reports that since Oct 2022 she started noticing shortness of breath. Recently the shortness of breath started getting worse. She was seen by Jana Carvalho PA-C today. A CT chest done which showed positive for a PE on the left lower lobe.     Allergies:  No Known Allergies    Problem List:    Patient Active Problem List    Diagnosis Date Noted     Diverticulosis of colon, scattered 09/28/2020     Priority: Medium     Abnormal thyroid ultrasound 03/05/2018     Priority: Medium     History of cervical dysplasia 01/18/2018     Priority: Medium     Other lichen, not elsewhere classified 01/18/2018     Priority: Medium     Major depressive disorder, recurrent episode, mild (H) 01/18/2018     Priority: Medium     Other premature beats 01/18/2018     Priority: Medium     Overview:   short IN interval, cardiac workup negative.  Observation recommended by Dr Fletcher.       Thyroid cyst 09/17/2014     Priority: Medium     Anxiety, generalized 11/01/2012     Priority: Medium     Phlebitis and thrombophlebitis of other deep vessels of lower extremities 01/23/2010     Priority: Medium     Overview:   While on OCP's          Past Medical History:    Past Medical History:   Diagnosis Date     Dysplasia of cervix uteri      Lichen sclerosus et atrophicus      Low back pain      Other premature depolarization      Phlebitis and thrombophlebitis of other deep vessels of unspecified lower extremity (H)        Past Surgical History:    Past Surgical History:   Procedure Laterality Date     BIOPSY BREAST  01/1997    benign disease.     BIOPSY BREAST  2012    Benign  Stereotactic R breast Bx [Other][      COLONOSCOPY N/A 2020    F/U  hyperplastic and scattered diverticulosis     CONIZATION LEEP      2001,Loop     LAPAROSCOPIC ABLATION ENDOMETRIOSIS      2010     TONSILLECTOMY, ADENOIDECTOMY, COMBINED      No Comments Provided       Family History:    Family History   Problem Relation Age of Onset     Lung Cancer Mother      Hyperlipidemia Mother      Heart Disease Father         Heart Disease,Congestive heart failure     Ovarian Cancer Sister      Lung Cancer Sister      Lung Cancer Sister      Thyroid Disease Sister      Other - See Comments Sister      Cancer Brother      Breast Cancer Maternal Grandmother         Cancer-breast     Other - See Comments Other        Social History:  Marital Status:  Single [1]  Social History     Tobacco Use     Smoking status: Former     Packs/day: 1.00     Years: 10.00     Pack years: 10.00     Types: Cigarettes     Quit date: 2006     Years since quittin.2     Smokeless tobacco: Never   Vaping Use     Vaping status: Never Used   Substance Use Topics     Alcohol use: Yes     Alcohol/week: 2.5 standard drinks of alcohol     Comment: Alcoholic Drinks/day: occasionally, weekends     Drug use: No        Medications:    apixaban ANTICOAGULANT (ELIQUIS) 5 MG tablet  scopolamine (TRANSDERM) 1 MG/3DAYS 72 hr patch  sertraline (ZOLOFT) 25 MG tablet      Review of Systems   Constitutional: Negative.    Respiratory: Positive for shortness of breath.    Cardiovascular: Negative.    Gastrointestinal: Negative.    Genitourinary: Negative.    Neurological: Negative.    Psychiatric/Behavioral: Negative.        Physical Exam   BP: (!) 146/76  Pulse: 91  Temp: 98.8  F (37.1  C)  Resp: 19  Weight: 88.5 kg (195 lb)  SpO2: 99 %      Physical Exam  Vitals and nursing note reviewed.   Constitutional:       Appearance: Normal appearance.   Cardiovascular:      Rate and Rhythm: Normal rate and regular rhythm.      Pulses: Normal pulses.      Heart sounds: Normal heart sounds. No  murmur heard.     No friction rub. No gallop.   Pulmonary:      Effort: Pulmonary effort is normal.      Breath sounds: Normal breath sounds. No wheezing, rhonchi or rales.   Chest:      Chest wall: No tenderness.   Musculoskeletal:         General: Normal range of motion.      Right lower leg: Edema present.      Left lower leg: Edema present.   Skin:     General: Skin is warm.      Capillary Refill: Capillary refill takes less than 2 seconds.   Neurological:      General: No focal deficit present.      Mental Status: She is alert and oriented to person, place, and time. Mental status is at baseline.   Psychiatric:         Mood and Affect: Mood normal.         Behavior: Behavior normal.         Thought Content: Thought content normal.         Judgment: Judgment normal.         ED Course   Johanne Fsih is a 52 year old female who presents to the ED for evaluation of a PE. Patient was diagnosed with a DVT on 3/27/2022. She was started on Eliquis. Patient reports that since Oct 2022 she started noticing shortness of breath. Recently the shortness of breath started getting worse. She was seen by Jana Carvalho PA-C today. A CT chest done which showed positive for a PE on the left lower lobe. BP (!) 146/76   Pulse 91   Temp 98.8  F (37.1  C) (Tympanic)   Resp 19   Wt 88.5 kg (195 lb)   LMP  (LMP Unknown)   SpO2 99%   BMI 32.45 kg/m   Physical exam revealed mild edema bilaterally on both lower extremities. Otherwise reassuring physical. PESI score of 54 points, very low risk, Class I.     Discussed case with Dr. Hennessy. He is in agreement with patient to continue outpatient treatment with Eliquis.     I discussed with the patient that she is a good candidate for outpatient therapy, which she is currently on Eliquis. I explained to patient that she might need to be on Eliquis for longer term given that this is a recurrent blood clot. I also suggested that patient should complete an echocardiogram. Given  that the blood clot is in the left lung, there could potentially be increased pressure to the heart. I am placing the echocardiogram referral for patient to complete. Patient verbalized understanding and comfortable with discharge plan. All questions were answered to the best of my knowledge. Patient was comfortable discharging home.       Results for orders placed or performed during the hospital encounter of 04/03/23 (from the past 24 hour(s))   CBC with platelets differential    Narrative    The following orders were created for panel order CBC with platelets differential.  Procedure                               Abnormality         Status                     ---------                               -----------         ------                     CBC with platelets and d...[[id number]]  Abnormal            Final result                 Please view results for these tests on the individual orders.   Basic metabolic panel   Result Value Ref Range    Sodium 139 136 - 145 mmol/L    Potassium 4.1 3.4 - 5.3 mmol/L    Chloride 104 98 - 107 mmol/L    Carbon Dioxide (CO2) 25 22 - 29 mmol/L    Anion Gap 10 7 - 15 mmol/L    Urea Nitrogen 13.2 6.0 - 20.0 mg/dL    Creatinine 0.86 0.51 - 0.95 mg/dL    Calcium 9.3 8.6 - 10.0 mg/dL    Glucose 88 70 - 99 mg/dL    GFR Estimate 80 >60 mL/min/1.73m2   Extra Tube (Central Draw)    Narrative    The following orders were created for panel order Extra Tube (Central Draw).  Procedure                               Abnormality         Status                     ---------                               -----------         ------                     Extra Blue Top Tube[[id number]]                              Final result               Extra Red Top Tube[[id number]]                               Final result               Extra Green Top (Lithium...[[id number]]                      Final result                 Please view results for these tests on the individual orders.   CBC with platelets and  differential   Result Value Ref Range    WBC Count 9.2 4.0 - 11.0 10e3/uL    RBC Count 4.50 3.80 - 5.20 10e6/uL    Hemoglobin 12.5 11.7 - 15.7 g/dL    Hematocrit 37.9 35.0 - 47.0 %    MCV 84 78 - 100 fL    MCH 27.8 26.5 - 33.0 pg    MCHC 33.0 31.5 - 36.5 g/dL    RDW 16.3 (H) 10.0 - 15.0 %    Platelet Count 328 150 - 450 10e3/uL    % Neutrophils 50 %    % Lymphocytes 39 %    % Monocytes 7 %    % Eosinophils 3 %    % Basophils 1 %    % Immature Granulocytes 0 %    NRBCs per 100 WBC 0 <1 /100    Absolute Neutrophils 4.7 1.6 - 8.3 10e3/uL    Absolute Lymphocytes 3.5 0.8 - 5.3 10e3/uL    Absolute Monocytes 0.6 0.0 - 1.3 10e3/uL    Absolute Eosinophils 0.2 0.0 - 0.7 10e3/uL    Absolute Basophils 0.1 0.0 - 0.2 10e3/uL    Absolute Immature Granulocytes 0.0 <=0.4 10e3/uL    Absolute NRBCs 0.0 10e3/uL   Extra Blue Top Tube   Result Value Ref Range    Hold Specimen x    Extra Red Top Tube   Result Value Ref Range    Hold Specimen x    Extra Green Top (Lithium Heparin) ON ICE   Result Value Ref Range    Hold Specimen x        Medications - No data to display    Assessments & Plan (with Medical Decision Making)     I have reviewed the nursing notes.    I have reviewed the findings, diagnosis, plan and need for follow up with the patient.  Medical Decision Making  The patient's presentation was of low complexity (an acute and uncomplicated illness or injury).    The patient's evaluation involved:  an assessment requiring an independent historian (see separate area of note for details)  review of external note(s) from 3+ sources (see separate area of note for details)  independent interpretation of testing performed by another health professional (see separate area of note for details)  discussion of management or test interpretation with another health professional (see separate area of note for details)    The patient's management necessitated only low risk treatment.      New Prescriptions    No medications on file       Final  diagnoses:   Pulmonary embolism (H)       4/3/2023   [facility]     Mimi Tran NP  04/03/23 [id number]

## 2023-04-03 NOTE — DISCHARGE INSTRUCTIONS
You are currently stable to be a candidate for outpatient treatment for the pulmonary embolism. You are on Eliquis which should help treat that blood clot. I recommend that you have a conversation with your primary care as you might need to be on Eliquis for a longer period of time given that the DVT is recurrent. If you experience any worsening shortness of breath, chest pain, dizziness, lightheadness, or heart palpitations you will need to be re-evaluated in the ER. I would recommend for you to complete an echocardiogram to make sure your heart is functioning appropriately. I will place the referral for you to complete the echocardiogram outpatient.

## 2023-04-03 NOTE — NURSING NOTE
Patient presents to clinic with SOB that has been going on for a couple months. Triage nurse did advise her to go to Er which she declined.  Joanna Dumont LPN ....................  4/3/2023   8:44 AM

## 2023-04-03 NOTE — ED TRIAGE NOTES
ED Nursing Triage Note (General)   ________________________________    Johanne Fish is a 52 year old Female that presents to triage via private vehicle with complaints of SOB.  Patient states she had DVT's and states she had a CTA completed this afternoon which confirmed PE's.  Patient states she was escorted here by CT staff.  Patient states she started eliquis 1 week ago. Patient states SOB has been for the past couple weeks, however, states it has recently gotten worse.   Significant symptoms had onset 1 week ago.  Patient appears alert behavior.  GCS-15  Airway: intact  Breathing noted as Normal  Action taken:2      PRE HOSPITAL PRIOR LIVING SITUATION-home

## 2023-04-03 NOTE — TELEPHONE ENCOUNTER
Notified via telephone by clinic LPN positive results of CT for PE. Patient is currently being treated with Eliquis for LLE DVT. Given I was out of the office at the time it was recommend to discuss with the [address] doc of the day or present to ED for additional evaluation if symptoms have worsened since when she was evaluated in the clinic this morning.     Jana Carvalho PA-C on 4/3/2023 at 4:40 PM

## 2023-04-18 ENCOUNTER — HOSPITAL ENCOUNTER (OUTPATIENT)
Dept: CARDIOLOGY | Facility: OTHER | Age: 53
Discharge: HOME | End: 2023-04-18
Attending: NURSE PRACTITIONER | Admitting: FAMILY MEDICINE
Payer: COMMERCIAL

## 2023-04-18 DIAGNOSIS — I26.99 PULMONARY EMBOLISM (H): ICD-10-CM

## 2023-04-18 LAB — LVEF ECHO: NORMAL

## 2023-04-18 PROCEDURE — 255N000002 HC RX 255 OP 636: Performed by: NURSE PRACTITIONER

## 2023-04-18 PROCEDURE — 999N000208 ECHOCARDIOGRAM COMPLETE

## 2023-04-18 RX ADMIN — PERFLUTREN 1 ML: 6.52 INJECTION, SUSPENSION INTRAVENOUS at 11:05

## 2023-04-26 DIAGNOSIS — I51.7 CONCENTRIC LEFT VENTRICULAR HYPERTROPHY: ICD-10-CM

## 2023-04-26 DIAGNOSIS — I26.94 MULTIPLE SUBSEGMENTAL PULMONARY EMBOLI WITHOUT ACUTE COR PULMONALE (H): ICD-10-CM

## 2023-04-26 DIAGNOSIS — F33.0 MAJOR DEPRESSIVE DISORDER, RECURRENT EPISODE, MILD (H): Primary | ICD-10-CM

## 2023-05-07 DIAGNOSIS — F41.1 ANXIETY, GENERALIZED: ICD-10-CM

## 2023-05-07 DIAGNOSIS — F33.0 MAJOR DEPRESSIVE DISORDER, RECURRENT EPISODE, MILD (H): ICD-10-CM

## 2023-05-09 RX ORDER — SERTRALINE HYDROCHLORIDE 25 MG/1
TABLET, FILM COATED ORAL
Qty: 60 TABLET | Refills: 0 | Status: SHIPPED | OUTPATIENT
Start: 2023-05-09 | End: 2023-06-08

## 2023-05-10 NOTE — PROGRESS NOTES
Assessment & Plan     1. Cervical cancer screening  Pap smear obtained. UTD on all other preventative screenings.   - Pap Screen Thin Prep      Return if symptoms worsen or fail to improve.    Jana Carvalho PA-C  [facility] AND [facility]    Kiel Herrera is a 52 year old, presenting for the following health issues:  Gyn Exam      History of Present Illness       Reason for visit:  Preventative pap    She eats 0-1 servings of fruits and vegetables daily.She consumes 0 sweetened beverage(s) daily.She exercises with enough effort to increase her heart rate 10 to 19 minutes per day.  She exercises with enough effort to increase her heart rate 3 or less days per week.   She is taking medications regularly.    Today's PHQ-9         PHQ-9 Total Score: 6    PHQ-9 Q9 Thoughts of better off dead/self-harm past 2 weeks :   Not at all    How difficult have these problems made it for you to do your work, take care of things at home, or get along with other people: Somewhat difficult     Here for Pap smear screening. Physical and other preventative care completed in 11/2022. Last pap smear completed 03/2018, NIL, HPV-.       PAST MEDICAL HISTORY:   Past Medical History:   Diagnosis Date     Dysplasia of cervix uteri     2001,abnormal Paps, treated by loop excision.     Lichen sclerosus et atrophicus     2005,external genital biopsy     Low back pain     Intermittent     Other premature depolarization     No Comments Provided     Phlebitis and thrombophlebitis of other deep vessels of unspecified lower extremity (H)     1/23/2010       PAST SURGICAL HISTORY:   Past Surgical History:   Procedure Laterality Date     BIOPSY BREAST  01/1997    benign disease.     BIOPSY BREAST  2012    Benign  Stereotactic R breast Bx [Other][     COLONOSCOPY N/A 09/28/2020    F/U 2030 hyperplastic and scattered diverticulosis     CONIZATION LEEP      9/2001,Loop     LAPAROSCOPIC ABLATION ENDOMETRIOSIS      07/20/2010      "TONSILLECTOMY, ADENOIDECTOMY, COMBINED      No Comments Provided       FAMILY HISTORY:   Family History   Problem Relation Age of Onset     Lung Cancer Mother      Hyperlipidemia Mother      Heart Disease Father         Heart Disease,Congestive heart failure     Ovarian Cancer Sister      Lung Cancer Sister      Lung Cancer Sister      Thyroid Disease Sister      Other - See Comments Sister      Cancer Brother      Breast Cancer Maternal Grandmother         Cancer-breast     Other - See Comments Other        SOCIAL HISTORY:   Social History     Tobacco Use     Smoking status: Former     Packs/day: 1.00     Years: 10.00     Pack years: 10.00     Types: Cigarettes     Quit date: 2006     Years since quittin.3     Smokeless tobacco: Never   Vaping Use     Vaping status: Never Used   Substance Use Topics     Alcohol use: Yes     Alcohol/week: 2.5 standard drinks of alcohol     Comment: Alcoholic Drinks/day: occasionally, weekends      No Known Allergies  Current Outpatient Medications   Medication     apixaban ANTICOAGULANT (ELIQUIS) 5 MG tablet     scopolamine (TRANSDERM) 1 MG/3DAYS 72 hr patch     sertraline (ZOLOFT) 25 MG tablet     No current facility-administered medications for this visit.       Review of Systems   Per HPI        Objective    /76   Pulse 87   Temp 97.9  F (36.6  C)   Resp 12   Ht 1.651 m (5' 5\")   Wt 87.5 kg (193 lb)   LMP  (LMP Unknown)   SpO2 98%   BMI 32.12 kg/m    Body mass index is 32.12 kg/m .  Physical Exam   General: Pleasant, in no apparent distress.  Genitourinary Exam Female: External genitalia, vulva and vagina are without lesions, masses, or ulcerations. Speculum exam reveals normal appearing cervix.  No cervical motion tenderness. Pap smear obtained.   Psych: Appropriate mood and affect.          "

## 2023-05-11 ENCOUNTER — OFFICE VISIT (OUTPATIENT)
Dept: FAMILY MEDICINE | Facility: OTHER | Age: 53
End: 2023-05-11
Attending: PHYSICIAN ASSISTANT
Payer: COMMERCIAL

## 2023-05-11 VITALS
TEMPERATURE: 97.9 F | DIASTOLIC BLOOD PRESSURE: 76 MMHG | SYSTOLIC BLOOD PRESSURE: 128 MMHG | HEIGHT: 65 IN | OXYGEN SATURATION: 98 % | HEART RATE: 87 BPM | RESPIRATION RATE: 12 BRPM | WEIGHT: 193 LBS | BODY MASS INDEX: 32.15 KG/M2

## 2023-05-11 DIAGNOSIS — Z12.4 CERVICAL CANCER SCREENING: Primary | ICD-10-CM

## 2023-05-11 PROBLEM — I26.99 PULMONARY EMBOLI (H): Status: ACTIVE | Noted: 2023-04-03

## 2023-05-11 PROCEDURE — 99212 OFFICE O/P EST SF 10 MIN: CPT | Performed by: PHYSICIAN ASSISTANT

## 2023-05-11 PROCEDURE — G0123 SCREEN CERV/VAG THIN LAYER: HCPCS | Performed by: PHYSICIAN ASSISTANT

## 2023-05-11 PROCEDURE — 87624 HPV HI-RISK TYP POOLED RSLT: CPT | Mod: ZL | Performed by: PHYSICIAN ASSISTANT

## 2023-05-11 ASSESSMENT — PATIENT HEALTH QUESTIONNAIRE - PHQ9
SUM OF ALL RESPONSES TO PHQ QUESTIONS 1-9: 6
SUM OF ALL RESPONSES TO PHQ QUESTIONS 1-9: 6
10. IF YOU CHECKED OFF ANY PROBLEMS, HOW DIFFICULT HAVE THESE PROBLEMS MADE IT FOR YOU TO DO YOUR WORK, TAKE CARE OF THINGS AT HOME, OR GET ALONG WITH OTHER PEOPLE: SOMEWHAT DIFFICULT

## 2023-05-11 ASSESSMENT — PAIN SCALES - GENERAL: PAINLEVEL: NO PAIN (0)

## 2023-05-11 NOTE — NURSING NOTE
Patient presents to clinic for pap.  Joanna Dumont LPN ....................  5/11/2023   7:51 AM

## 2023-05-19 LAB
BKR LAB AP GYN ADEQUACY: NORMAL
BKR LAB AP GYN INTERPRETATION: NORMAL
BKR LAB AP HPV REFLEX: NORMAL
BKR LAB AP PREVIOUS ABNORMAL: NORMAL
HUMAN PAPILLOMA VIRUS 16 DNA: NEGATIVE
HUMAN PAPILLOMA VIRUS 18 DNA: NEGATIVE
HUMAN PAPILLOMA VIRUS FINAL DIAGNOSIS: NORMAL
HUMAN PAPILLOMA VIRUS OTHER HR: NEGATIVE
PATH REPORT.COMMENTS IMP SPEC: NORMAL
PATH REPORT.COMMENTS IMP SPEC: NORMAL
PATH REPORT.RELEVANT HX SPEC: NORMAL

## 2023-06-04 DIAGNOSIS — F33.0 MAJOR DEPRESSIVE DISORDER, RECURRENT EPISODE, MILD (H): ICD-10-CM

## 2023-06-04 DIAGNOSIS — F41.1 ANXIETY, GENERALIZED: ICD-10-CM

## 2023-06-08 RX ORDER — SERTRALINE HYDROCHLORIDE 25 MG/1
TABLET, FILM COATED ORAL
Qty: 180 TABLET | Refills: 0 | Status: SHIPPED | OUTPATIENT
Start: 2023-06-08 | End: 2023-09-09

## 2023-06-08 NOTE — TELEPHONE ENCOUNTER
[id number] sent Rx request for the following:      Requested Prescriptions   Pending Prescriptions Disp Refills     sertraline (ZOLOFT) 25 MG tablet [Pharmacy Med Name: Sertraline HCl 25 MG Oral Tablet] 60 tablet 0     Sig: Take 2 tablets by mouth once daily       SSRIs Protocol Failed - 6/8/2023  4:25 PM        Failed - PHQ-9 score less than 5 in past 6 months     Please review last PHQ-9 score.      Last Prescription Date:   5/9/23  Last Fill Qty/Refills:         60, R-0    Last Office Visit:              5/11/23   Future Office visit:           None    Last filled in refill encounter. No notes by provider why it was filled for limited supply.     Jayshree Lindsey RN .............. 6/8/2023  4:42 PM

## 2023-06-23 ENCOUNTER — ONCOLOGY VISIT (OUTPATIENT)
Dept: ONCOLOGY | Facility: OTHER | Age: 53
End: 2023-06-23
Attending: INTERNAL MEDICINE
Payer: COMMERCIAL

## 2023-06-23 VITALS
DIASTOLIC BLOOD PRESSURE: 88 MMHG | SYSTOLIC BLOOD PRESSURE: 126 MMHG | OXYGEN SATURATION: 96 % | HEIGHT: 65 IN | BODY MASS INDEX: 31.16 KG/M2 | WEIGHT: 187 LBS | RESPIRATION RATE: 12 BRPM | TEMPERATURE: 96.6 F | HEART RATE: 80 BPM

## 2023-06-23 DIAGNOSIS — I82.412 ACUTE DEEP VEIN THROMBOSIS (DVT) OF FEMORAL VEIN OF LEFT LOWER EXTREMITY (H): ICD-10-CM

## 2023-06-23 LAB
FACTOR 2 INTERPRETATION: ABNORMAL
FACTOR V INTERPRETATION: ABNORMAL
LAB DIRECTOR COMMENTS: ABNORMAL
LAB DIRECTOR DISCLAIMER: ABNORMAL
LAB DIRECTOR INTERPRETATION: ABNORMAL
LAB DIRECTOR METHODOLOGY: ABNORMAL
LAB DIRECTOR RESULTS: ABNORMAL
SPECIMEN DESCRIPTION: ABNORMAL

## 2023-06-23 PROCEDURE — G0452 MOLECULAR PATHOLOGY INTERPR: HCPCS | Mod: 26 | Performed by: PATHOLOGY

## 2023-06-23 PROCEDURE — 85300 ANTITHROMBIN III ACTIVITY: CPT | Mod: ZL | Performed by: INTERNAL MEDICINE

## 2023-06-23 PROCEDURE — 85303 CLOT INHIBIT PROT C ACTIVITY: CPT | Mod: ZL | Performed by: INTERNAL MEDICINE

## 2023-06-23 PROCEDURE — 86146 BETA-2 GLYCOPROTEIN ANTIBODY: CPT | Mod: ZL | Performed by: INTERNAL MEDICINE

## 2023-06-23 PROCEDURE — 86147 CARDIOLIPIN ANTIBODY EA IG: CPT | Mod: ZL | Performed by: INTERNAL MEDICINE

## 2023-06-23 PROCEDURE — 99205 OFFICE O/P NEW HI 60 MIN: CPT | Performed by: INTERNAL MEDICINE

## 2023-06-23 PROCEDURE — 36415 COLL VENOUS BLD VENIPUNCTURE: CPT | Mod: ZL | Performed by: INTERNAL MEDICINE

## 2023-06-23 PROCEDURE — 81240 F2 GENE: CPT | Mod: ZL | Performed by: INTERNAL MEDICINE

## 2023-06-23 PROCEDURE — 99417 PROLNG OP E/M EACH 15 MIN: CPT | Performed by: INTERNAL MEDICINE

## 2023-06-23 PROCEDURE — 85306 CLOT INHIBIT PROT S FREE: CPT | Mod: ZL | Performed by: INTERNAL MEDICINE

## 2023-06-23 ASSESSMENT — PAIN SCALES - GENERAL: PAINLEVEL: NO PAIN (0)

## 2023-06-23 NOTE — NURSING NOTE
Chief Complaint   Patient presents with     Hematology     Recurrent DVT     Medication Reconciliation: complete    Usha Garcia CMA ([facility]) 6/23/2023 9:00 AM

## 2023-06-23 NOTE — PROGRESS NOTES
HEMATOLOGY CONSULT NOTE  Jun 23, 2023    Reason for referral: DVT and PE    Referring provider: Jana Ahn APRN CNP    HISTORY OF PRESENT ILLNESS  Johanne Fihs is a 53 year old female with with PMH as stated below who is seen in the hematology clinic for recent diagnosis of DVT and PE    Her history in chart is as follows:    Ms. Fish was diagnosed with a left lower extremity DVT in 2010 1/25/2010: Left lower extremity venous duplex ultrasound: Deep  venous thrombosis in the left popliteal vein and peroneal vein.  The other veins of the left lower extremity are negative for DVT.      At that time she was treated with warfarin for around 3 months.  She does not remember any clear provoking factors however she may have been on hormone birth control at that time    She again developed swelling and pain in her left leg in March 2023    3/27/2023: Ultrasound lower extremity venous duplex left positive for DVT.  Occlusive thrombus in the distal femoral vein as well as throughout the popliteal vein into the posterior tibial veins.    She was started on Eliquis.  She had been having dyspnea on exertion since October however this worsened 1 week after she was diagnosed with a lower extremity DVT.  She was already on Eliquis at that time.  She subsequently had a CT PE done which showed    4/3/2023: CT chest pulmonary embolism with contrast: Positive for pulmonary emboli as described above. No distinct  evidence of evolving pulmonary infarction.    Her shortness of breath has improved since being on Eliquis.  She continues to have chronic leg swelling in her left leg.  She has been having swelling since her initial DVT diagnosis in 2010.  Again no clear provoking factors prior to this diagnosis of DVT.  No prolonged travel, prolonged illness or surgery.  She has not been on birth control or hormone replacement therapy since 2010.  Denies any joint pain or rash.  Denies any unintentional weight loss or  abdominal pain.    Has had 2 full-term pregnancies in the past.  No history of pregnancy losses or blood clots or preeclampsia with pregnancy    Family history: Family history of blood clots in her father in his 40s to 50s.  No family history of any autoimmune disorder    REVIEW OF SYSTEMS  A 12-point ROS negative except as in HPI      Current Outpatient Medications   Medication Sig Dispense Refill     apixaban ANTICOAGULANT (ELIQUIS) 5 MG tablet Take 2 tablets (10 mg) by mouth 2 times daily for 7 days, THEN 1 tablet (5 mg) 2 times daily for 180 days. 388 tablet 0     sertraline (ZOLOFT) 25 MG tablet Take 2 tablets by mouth once daily 180 tablet 0     scopolamine (TRANSDERM) 1 MG/3DAYS 72 hr patch Place 1 patch onto the skin every 72 hours (Patient not taking: Reported on 6/23/2023) 4 patch 0       No Known Allergies  Immunization History   Administered Date(s) Administered     COVID-19 Bivalent 18+ (Moderna) 02/01/2023     COVID-19 Monovalent 18+ (Moderna) 02/05/2021, 03/05/2021, 11/15/2021     DTaP, Unspecified 02/16/2006     Flu, Unspecified 11/04/2011     Influenza (IIV3) PF 10/18/2012, 11/06/2013     Influenza Vaccine >6 months (Alfuria,Fluzone) 09/11/2014, 10/15/2015, 11/17/2016, 11/01/2017, 11/14/2018, 10/23/2019, 10/28/2020     TDAP Vaccine (Boostrix) 01/25/2017     Twinrix A/B 10/21/2014, 11/25/2014, 06/30/2015       Past Medical History:   Diagnosis Date     Dysplasia of cervix uteri     2001,abnormal Paps, treated by loop excision.     Lichen sclerosus et atrophicus     2005,external genital biopsy     Low back pain     Intermittent     Other premature depolarization     No Comments Provided     Phlebitis and thrombophlebitis of other deep vessels of unspecified lower extremity (H)     1/23/2010       Past Surgical History:   Procedure Laterality Date     BIOPSY BREAST  01/1997    benign disease.     BIOPSY BREAST  2012    Benign  Stereotactic R breast Bx [Other][     COLONOSCOPY N/A 09/28/2020    F/U  "2030 hyperplastic and scattered diverticulosis     CONIZATION LEEP      2001,Loop     LAPAROSCOPIC ABLATION ENDOMETRIOSIS      2010     TONSILLECTOMY, ADENOIDECTOMY, COMBINED      No Comments Provided       SOCIAL HISTORY  History   Smoking Status     Former     Packs/day: 1.00     Years: 10.00     Types: Cigarettes     Quit date: 2006   Smokeless Tobacco     Never    Social History    Substance and Sexual Activity      Alcohol use: Yes        Alcohol/week: 2.5 standard drinks of alcohol        Comment: Alcoholic Drinks/day: occasionally, weekends     History   Drug Use No       FAMILY HISTORY  Family History   Problem Relation Age of Onset     Lung Cancer Mother      Hyperlipidemia Mother      Heart Disease Father         Heart Disease,Congestive heart failure     Ovarian Cancer Sister      Lung Cancer Sister      Lung Cancer Sister      Thyroid Disease Sister      Other - See Comments Sister      Cancer Brother      Breast Cancer Maternal Grandmother         Cancer-breast     Other - See Comments Other        PHYSICAL EXAMINATION  /88 (BP Location: Right arm, Patient Position: Sitting, Cuff Size: Adult Regular)   Pulse 80   Temp (!) 96.6  F (35.9  C) (Tympanic)   Resp 12   Ht 1.651 m (5' 5\")   Wt 84.8 kg (187 lb)   LMP  (LMP Unknown)   SpO2 96%   BMI 31.12 kg/m    Wt Readings from Last 2 Encounters:   23 84.8 kg (187 lb)   23 87.5 kg (193 lb)     Physical Exam  Constitutional:       Appearance: Normal appearance.   Pulmonary:      Effort: Pulmonary effort is normal.   Musculoskeletal:      Left lower leg: Edema present.   Neurological:      General: No focal deficit present.      Mental Status: She is alert and oriented to person, place, and time.       Laboratory and imagin2023: Echocardiogram:Global and regional left ventricular function is hyperkinetic with an EF of  65-70%.  Global right ventricular function is normal. The right ventricle is normal  size.  No " significant valvular abnormalities.  The estimated PA systolic pressure is 32 mmHg.    ASSESSMENT AND PLAN    1.  DVT and PE:      History of left lower extremity DVT diagnosed 3/2023.  1 week later she was diagnosed with embolism.  Currently on Eliquis.  Symptoms have improved on Eliquis with improvement in shortness of breath  No clear provoking factors.  Prior history of left lower extremity DVT in 2010.  At that time she was on hormone birth control and was told to stop.  No history of pregnancy losses or blood clots during pregnancy    Family history of blood clots in father    Discussed today with Ms. Fish that as this is her second instance of DVT and and history of PE she should be on long-term anticoagulation.  I also discussed hypercoagulable work-up.  Although it will not  in her case as she I think is a candidate already for long-term anticoagulation.  If she does have an inheritable mutation or deficiency then this could lead to cascade testing as female family members with similar mutation or deficiency should not be on hormonal birth control or hormone replacement.    She is agreeable to proceed.  Orders placed for factor II and factor V Leiden mutation, Antithrombin III level protein C level and protein S levels.  Will also test for anticardiolipin antibodies IgG and IgM and beta-2 glycoprotein IgG and IgM.  Cannot test for lupus anticoagulant as she is on blood thinners.    Both her lower extremity DVT have been in the left side therefore I would also recommend a CT abdomen and pelvis with and without contrast to evaluate for May Thurner syndrome.  This would also help rule out any underlying malignancy given the burden of clot she had especially in her lower extremity.    Symptomatically she is doing better.  Has chronic left lower extremity swelling.  Discussed compression stockings could help.  From her breathing standpoint she is doing well.  She did have a echo done for 2023  which showed normal right ventricular ejection fraction.    For now will recommend follow-up in 1 month to go over her hypercoagulable testing that was sent today and CT abdomen and pelvis with and without contrast.    Total time spent on the patient on day of encounter was 90 minutes doing chart review, review of test results, interpretation of results, patient visit and documentation.      Deja Whelan MD

## 2023-06-23 NOTE — Clinical Note
George Milan,  I tried calling Ms. Fish twice regarding her CT abdomen and pelvis.  Her CT scan showed mesenteric pancolitis which I do not think is anything to worry about it also showed possible May Thurner anatomy so I did put in a referral for vascular surgery.  I left it as patient preference so we could send the referral wherever she wants to.  Also can you please let her know what my recommendations are  Thank you

## 2023-06-26 ENCOUNTER — HOSPITAL ENCOUNTER (OUTPATIENT)
Dept: CT IMAGING | Facility: OTHER | Age: 53
Discharge: HOME | End: 2023-06-26
Attending: INTERNAL MEDICINE | Admitting: INTERNAL MEDICINE
Payer: COMMERCIAL

## 2023-06-26 DIAGNOSIS — I82.412 ACUTE DEEP VEIN THROMBOSIS (DVT) OF FEMORAL VEIN OF LEFT LOWER EXTREMITY (H): ICD-10-CM

## 2023-06-26 LAB
AT III ACT/NOR PPP CHRO: 93 % (ref 85–135)
B2 GLYCOPROT1 IGG SERPL IA-ACNC: <0.8 U/ML
B2 GLYCOPROT1 IGM SERPL IA-ACNC: <2.4 U/ML
CARDIOLIPIN IGG SER IA-ACNC: <2 GPL-U/ML
CARDIOLIPIN IGG SER IA-ACNC: NEGATIVE
CARDIOLIPIN IGM SER IA-ACNC: <2 MPL-U/ML
CARDIOLIPIN IGM SER IA-ACNC: NEGATIVE
PROT C ACT/NOR PPP CHRO: 83 % (ref 70–170)
PROT S FREE AG ACT/NOR PPP IA: 104 % (ref 55–125)

## 2023-06-26 PROCEDURE — 74178 CT ABD&PLV WO CNTR FLWD CNTR: CPT

## 2023-06-26 PROCEDURE — 250N000011 HC RX IP 250 OP 636: Performed by: INTERNAL MEDICINE

## 2023-06-26 RX ORDER — IOPAMIDOL 755 MG/ML
108 INJECTION, SOLUTION INTRAVASCULAR ONCE
Status: COMPLETED | OUTPATIENT
Start: 2023-06-26 | End: 2023-06-26

## 2023-06-26 RX ADMIN — IOPAMIDOL 108 ML: 755 INJECTION, SOLUTION INTRAVENOUS at 15:42

## 2023-06-26 NOTE — PROGRESS NOTES
1.  Has the patient had a previous reaction to IV contrast? no    2.  Does the patient have kidney disease? no    3.  Is the patient on dialysis? no    If YES to any of these questions, exam will be reviewed with a Radiologist before administering contrast.  IV Contrast- Discharge Instructions After Your CT Scan      The IV contrast you received today will be filtered from your bloodstream by your kidneys during the next 24 hours and pass from the body in urine.  You will not be aware of this process and your urine will not change in color.  To help this process you should drink at least 4 additional glasses of water or juice today.  This reduces stress on your kidneys.    Most contrast reactions are immediate.  Should you develop symptoms of concern after discharge, contact the department at the number below.  After hours you should contact your personal physician.  If you develop breathing distress or wheezing, call 911.       Dr. Candido Nguyen

## 2023-06-29 NOTE — PROGRESS NOTES
Attempted to call Ms. Fish twice to discuss findings of the CT abdomen and pelvis.  Was unable to get through voicemail left.  CT abdomen showed mesenteric panniculitis.  It also showed some mass effect of the left common iliac vein by the right common iliac artery which is consistent with May Thurner anatomy.  She has had 2 episodes of left lower extremity DVT.  Therefore I think it is reasonable to get an opinion from vascular surgery if any intervention is recommended at this point.  Will place a referral.  We will also continue to try to call Ms. Fish to discuss these recommendations.

## 2023-07-05 DIAGNOSIS — I82.412 ACUTE DEEP VEIN THROMBOSIS (DVT) OF FEMORAL VEIN OF LEFT LOWER EXTREMITY (H): ICD-10-CM

## 2023-07-26 ENCOUNTER — HOSPITAL ENCOUNTER (OUTPATIENT)
Dept: CARDIOLOGY | Facility: OTHER | Age: 53
Discharge: HOME | End: 2023-07-26
Attending: FAMILY MEDICINE | Admitting: FAMILY MEDICINE
Payer: COMMERCIAL

## 2023-07-26 ENCOUNTER — MYC MEDICAL ADVICE (OUTPATIENT)
Dept: ONCOLOGY | Facility: OTHER | Age: 53
End: 2023-07-26

## 2023-07-26 DIAGNOSIS — I26.94 MULTIPLE SUBSEGMENTAL PULMONARY EMBOLI WITHOUT ACUTE COR PULMONALE (H): ICD-10-CM

## 2023-07-26 DIAGNOSIS — I51.7 CONCENTRIC LEFT VENTRICULAR HYPERTROPHY: ICD-10-CM

## 2023-07-26 LAB — LVEF ECHO: NORMAL

## 2023-07-26 PROCEDURE — 93306 TTE W/DOPPLER COMPLETE: CPT

## 2023-07-26 PROCEDURE — 93306 TTE W/DOPPLER COMPLETE: CPT | Mod: 26 | Performed by: INTERNAL MEDICINE

## 2023-07-26 NOTE — TELEPHONE ENCOUNTER
Spoke with  , Yahaira and she will follow up on this.  Sunitha Feliciano RN on 7/26/2023 at 10:33 AM

## 2023-08-11 ENCOUNTER — ONCOLOGY VISIT (OUTPATIENT)
Dept: ONCOLOGY | Facility: OTHER | Age: 53
End: 2023-08-11
Attending: INTERNAL MEDICINE
Payer: COMMERCIAL

## 2023-08-11 VITALS
DIASTOLIC BLOOD PRESSURE: 82 MMHG | WEIGHT: 186 LBS | TEMPERATURE: 96.8 F | RESPIRATION RATE: 16 BRPM | BODY MASS INDEX: 30.95 KG/M2 | SYSTOLIC BLOOD PRESSURE: 116 MMHG | HEART RATE: 80 BPM | OXYGEN SATURATION: 97 %

## 2023-08-11 DIAGNOSIS — I82.412 ACUTE DEEP VEIN THROMBOSIS (DVT) OF FEMORAL VEIN OF LEFT LOWER EXTREMITY (H): Primary | ICD-10-CM

## 2023-08-11 PROCEDURE — 99215 OFFICE O/P EST HI 40 MIN: CPT | Performed by: INTERNAL MEDICINE

## 2023-08-11 ASSESSMENT — PAIN SCALES - GENERAL: PAINLEVEL: MODERATE PAIN (4)

## 2023-08-11 NOTE — NURSING NOTE
Chief Complaint   Patient presents with    Hematology     F/U DVT & PE     Medication Reconciliation: complete    Hawa Sanchez CMA ([facility])

## 2023-08-11 NOTE — PROGRESS NOTES
HEMATOLOGY FOLLOW-UP NOTE   Aug 11, 2023    Reason for follow-up: DVT and PE      HISTORY OF PRESENT ILLNESS  Johanne Fish is a 53 year old female with with PMH as stated below who is seen in the hematology clinic for recent diagnosis of DVT and PE    Her history in chart is as follows:    Ms. Fish was diagnosed with a left lower extremity DVT in 2010 1/25/2010: Left lower extremity venous duplex ultrasound: Deep  venous thrombosis in the left popliteal vein and peroneal vein.  The other veins of the left lower extremity are negative for DVT.      At that time she was treated with warfarin for around 3 months.  She does not remember any clear provoking factors however she may have been on hormone birth control at that time    She again developed swelling and pain in her left leg in March 2023    3/27/2023: Ultrasound lower extremity venous duplex left positive for DVT.  Occlusive thrombus in the distal femoral vein as well as throughout the popliteal vein into the posterior tibial veins.    She was started on Eliquis.  She had been having dyspnea on exertion since October however this worsened 1 week after she was diagnosed with a lower extremity DVT.  She was already on Eliquis at that time.  She subsequently had a CT PE done which showed    4/3/2023: CT chest pulmonary embolism with contrast: Positive for pulmonary emboli as described above. No distinct  evidence of evolving pulmonary infarction.    Her shortness of breath has improved since being on Eliquis.  She continues to have chronic leg swelling in her left leg.  She has been having swelling since her initial DVT diagnosis in 2010.  Again no clear provoking factors prior to this diagnosis of DVT.  No prolonged travel, prolonged illness or surgery.  She has not been on birth control or hormone replacement therapy since 2010.      Has had 2 full-term pregnancies in the past.  No history of pregnancy losses or blood clots or preeclampsia with  pregnancy    Family history: Family history of blood clots in her father in his 40s to 50s.  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 No evidence of apparent retroperitoneal mass. Radiodense calculi of the right kidney. Additional nonacute findings as described above.    Interim history:    She is doing well.  Denies any leg pain.  Still continues to have left leg swelling on and off.  She is using compression stocking.  Denies any abdominal pain. Breathing is much better. Denies any worsening JUNE.     REVIEW OF SYSTEMS  A 12-point ROS negative except as in HPI      Current Outpatient Medications   Medication Sig Dispense Refill    apixaban ANTICOAGULANT (ELIQUIS) 5 MG tablet Take 2 tablets (10 mg) by mouth 2 times daily for 7 days, THEN 1 tablet (5 mg) 2 times daily for 180 days. 388 tablet 0    scopolamine (TRANSDERM) 1 MG/3DAYS 72 hr patch Place 1 patch onto the skin every 72 hours (Patient not taking: Reported on 6/23/2023) 4 patch 0    sertraline (ZOLOFT) 25 MG tablet Take 2 tablets by mouth once daily 180 tablet 0       No Known Allergies  Immunization History   Administered Date(s) Administered    COVID-19 Bivalent 18+ (Moderna) 02/01/2023    COVID-19 Monovalent 18+ (Moderna) 02/05/2021, 03/05/2021, 11/15/2021    DTaP, Unspecified 02/16/2006    Flu, Unspecified 11/04/2011    Influenza (IIV3) PF 10/18/2012, 11/06/2013    Influenza Vaccine >6 months (Alfuria,Fluzone) 09/11/2014, 10/15/2015, 11/17/2016, 11/01/2017, 11/14/2018, 10/23/2019, 10/28/2020    TDAP Vaccine (Boostrix) 01/25/2017    Twinrix A/B 10/21/2014, 11/25/2014, 06/30/2015       Past Medical History:   Diagnosis Date    Dysplasia of cervix uteri      2001,abnormal Paps, treated by loop excision.    Lichen sclerosus et atrophicus     2005,external genital biopsy    Low back pain     Intermittent    Other premature depolarization     No Comments Provided    Phlebitis and thrombophlebitis of other deep vessels of unspecified lower extremity (H)     1/23/2010       Past Surgical History:   Procedure Laterality Date    BIOPSY BREAST  01/1997    benign disease.    BIOPSY BREAST  2012    Benign  Stereotactic R breast Bx [Other][    COLONOSCOPY N/A 09/28/2020    F/U 2030 hyperplastic and scattered diverticulosis    CONIZATION LEEP      9/2001,Loop    LAPAROSCOPIC ABLATION ENDOMETRIOSIS      07/20/2010    TONSILLECTOMY, ADENOIDECTOMY, COMBINED      No Comments Provided       SOCIAL HISTORY  History   Smoking Status    Former    Packs/day: 1.00    Years: 10.00    Types: Cigarettes    Quit date: 1/1/2006   Smokeless Tobacco    Never    Social History    Substance and Sexual Activity      Alcohol use: Yes        Alcohol/week: 2.5 standard drinks of alcohol        Comment: Alcoholic Drinks/day: occasionally, weekends     History   Drug Use No       FAMILY HISTORY  Family History   Problem Relation Age of Onset    Lung Cancer Mother     Hyperlipidemia Mother     Heart Disease Father         Heart Disease,Congestive heart failure    Ovarian Cancer Sister     Lung Cancer Sister     Lung Cancer Sister     Thyroid Disease Sister     Other - See Comments Sister     Cancer Brother     Breast Cancer Maternal Grandmother         Cancer-breast    Other - See Comments Other        PHYSICAL EXAMINATION  LMP  (LMP Unknown)   Wt Readings from Last 2 Encounters:   06/23/23 84.8 kg (187 lb)   05/11/23 87.5 kg (193 lb)     Physical Exam  Constitutional:       Appearance: Normal appearance.   Pulmonary:      Effort: Pulmonary effort is normal.   Musculoskeletal:      Left lower leg: Edema present.   Neurological:      General: No focal deficit present.      Mental Status: She is alert and  oriented to person, place, and time.       Laboratory and imagin2023: Echocardiogram:Global and regional left ventricular function is hyperkinetic with an EF of  65-70%.  Global right ventricular function is normal. The right ventricle is normal  size.  No significant valvular abnormalities.  The estimated PA systolic pressure is 32 mmHg.    ASSESSMENT AND PLAN    1.  DVT and PE:      History of left lower extremity DVT diagnosed 3/2023.  1 week later she was diagnosed with embolism.  Currently on Eliquis.  Symptoms have improved on Eliquis with improvement in shortness of breath  No clear provoking factors.  Prior history of left lower extremity DVT in .  At that time she was on hormone birth control and was told to stop.  No history of pregnancy losses or blood clots during pregnancy    Family history of blood clots in father  During initial consultation discussed with Ms. Fish that as this is her second instance of DVT and and history of PE she should be on long-term anticoagulation.  I I had  also discussed hypercoagulable work-up.  Although it will not  in her case as she I think is a candidate already for long-term anticoagulation.  If she does have an inheritable mutation or deficiency then this could lead to cascade testing as female family members with similar mutation or deficiency should not be on hormonal birth control or hormone replacement.    Hypercoagulable workup showed that she has heterozygous factor V Leiden mutation.  No mutation for prothrombin gene mutation.  Antithrombin 3 level, protein C, protein S, cardiolipin antibodies IgG and IgM and beta-2 glycoprotein IgG and IgM were all within normal limits.  Could not test for lupus anticoagulant as she is on Eliquis.    CT scan of the abdomen with and without done to evaluate for May Thurner anatomy showed mesenteric panniculitis without any lymph node enlargement.  It also showed slight mass effect on the left  common iliac vein by the right common iliac artery.  Therefore she was referred to vascular surgery for consultation for evaluation for stenting.    Currently I would recommend she stay on the Eliquis 5 mg twice daily.  I would recommend March 2024 and at that time we can consider going down to a prophylactic dose of 2.5 mg twice daily.    Total time spent on the patient on day of encounter was 60 minutes doing chart review, review of test results, interpretation of results, patient visit and documentation.      Deja Whelan MD

## 2023-08-24 ENCOUNTER — TRANSFERRED RECORDS (OUTPATIENT)
Dept: HEALTH INFORMATION MANAGEMENT | Facility: OTHER | Age: 53
End: 2023-08-24
Payer: COMMERCIAL

## 2023-09-09 ENCOUNTER — MYC REFILL (OUTPATIENT)
Dept: FAMILY MEDICINE | Facility: OTHER | Age: 53
End: 2023-09-09
Payer: COMMERCIAL

## 2023-09-09 DIAGNOSIS — F33.0 MAJOR DEPRESSIVE DISORDER, RECURRENT EPISODE, MILD (H): ICD-10-CM

## 2023-09-09 DIAGNOSIS — F41.1 ANXIETY, GENERALIZED: ICD-10-CM

## 2023-09-12 ENCOUNTER — MYC REFILL (OUTPATIENT)
Dept: FAMILY MEDICINE | Facility: OTHER | Age: 53
End: 2023-09-12
Payer: COMMERCIAL

## 2023-09-12 DIAGNOSIS — F41.1 ANXIETY, GENERALIZED: ICD-10-CM

## 2023-09-12 DIAGNOSIS — F33.0 MAJOR DEPRESSIVE DISORDER, RECURRENT EPISODE, MILD (H): ICD-10-CM

## 2023-09-12 RX ORDER — SERTRALINE HYDROCHLORIDE 25 MG/1
50 TABLET, FILM COATED ORAL DAILY
Qty: 180 TABLET | Refills: 0 | Status: SHIPPED | OUTPATIENT
Start: 2023-09-12 | End: 2023-12-15

## 2023-09-12 RX ORDER — SERTRALINE HYDROCHLORIDE 25 MG/1
50 TABLET, FILM COATED ORAL DAILY
Qty: 180 TABLET | Refills: 0 | OUTPATIENT
Start: 2023-09-12

## 2023-11-20 ENCOUNTER — NURSE TRIAGE (OUTPATIENT)
Dept: FAMILY MEDICINE | Facility: OTHER | Age: 53
End: 2023-11-20
Payer: COMMERCIAL

## 2023-11-20 ENCOUNTER — HOSPITAL ENCOUNTER (EMERGENCY)
Facility: OTHER | Age: 53
Discharge: HOME | End: 2023-11-20
Attending: EMERGENCY MEDICINE | Admitting: EMERGENCY MEDICINE
Payer: COMMERCIAL

## 2023-11-20 ENCOUNTER — APPOINTMENT (OUTPATIENT)
Dept: CT IMAGING | Facility: OTHER | Age: 53
End: 2023-11-20
Attending: EMERGENCY MEDICINE
Payer: COMMERCIAL

## 2023-11-20 VITALS
HEIGHT: 65 IN | DIASTOLIC BLOOD PRESSURE: 81 MMHG | WEIGHT: 180 LBS | BODY MASS INDEX: 29.99 KG/M2 | TEMPERATURE: 97.7 F | SYSTOLIC BLOOD PRESSURE: 132 MMHG | RESPIRATION RATE: 17 BRPM | OXYGEN SATURATION: 98 % | HEART RATE: 89 BPM

## 2023-11-20 DIAGNOSIS — S09.90XA INJURY OF HEAD, INITIAL ENCOUNTER: ICD-10-CM

## 2023-11-20 DIAGNOSIS — S00.31XA NASAL ABRASION, INITIAL ENCOUNTER: ICD-10-CM

## 2023-11-20 DIAGNOSIS — S00.81XA FOREHEAD ABRASION, INITIAL ENCOUNTER: ICD-10-CM

## 2023-11-20 PROCEDURE — 70450 CT HEAD/BRAIN W/O DYE: CPT

## 2023-11-20 PROCEDURE — 99284 EMERGENCY DEPT VISIT MOD MDM: CPT | Mod: 25 | Performed by: EMERGENCY MEDICINE

## 2023-11-20 PROCEDURE — 99282 EMERGENCY DEPT VISIT SF MDM: CPT | Performed by: EMERGENCY MEDICINE

## 2023-11-20 NOTE — ED PROVIDER NOTES
[facility]  Emergency Department Visit Note    Fall      History of Present Illness     HPI:  Johanne Fish is a 53 year old female presenting with facial abrasions after fall yesterday.  She states she was walking and tripped and fell onto her face. There was no loss of consciousness. There is no associated vomiting, weakness, numbness, blurry vision, double vision. She was able to ambulate on the scene. No neck pain. The patient is anti-coagulated with Eliquis.  Sustained abrasions to her forehead and her nose.  She did not come to the emergency room after her fall but after she was told to come in due to her being on Eliquis.  Currently she denies headache    Medications:  Prior to Admission medications    Medication Sig Last Dose Taking? Auth Provider Long Term End Date   apixaban ANTICOAGULANT (ELIQUIS) 5 MG tablet Take 2 tablets (10 mg) by mouth 2 times daily for 7 days, THEN 1 tablet (5 mg) 2 times daily for 180 days. 11/19/2023 Yes Mary Mckinley MD  1/8/24   sertraline (ZOLOFT) 25 MG tablet Take 2 tablets (50 mg) by mouth daily 11/19/2023 Yes Jana Carvalho PA-C Yes        Allergies:  No Known Allergies    Problem List:  Patient Active Problem List   Diagnosis    Anxiety, generalized    History of cervical dysplasia    Phlebitis and thrombophlebitis of other deep vessels of lower extremities    Other lichen, not elsewhere classified    Major depressive disorder, recurrent episode, mild (H24)    Other premature beats    Thyroid cyst    Abnormal thyroid ultrasound    Diverticulosis of colon, scattered    Pulmonary emboli (H)       Past Medical History:  Past Medical History:   Diagnosis Date    Dysplasia of cervix uteri     2001,abnormal Paps, treated by loop excision.    Lichen sclerosus et atrophicus     2005,external genital biopsy    Low back pain     Intermittent    Other premature depolarization     No Comments Provided    Phlebitis and thrombophlebitis of  "other deep vessels of unspecified lower extremity (H)     2010       Past Surgical History:  Past Surgical History:   Procedure Laterality Date    BIOPSY BREAST  1997    benign disease.    BIOPSY BREAST  2012    Benign  Stereotactic R breast Bx [Other][    COLONOSCOPY N/A 2020    F/U 2030 hyperplastic and scattered diverticulosis    CONIZATION LEEP      2001,Loop    LAPAROSCOPIC ABLATION ENDOMETRIOSIS      2010    TONSILLECTOMY, ADENOIDECTOMY, COMBINED      No Comments Provided       Social History:  Social History     Tobacco Use    Smoking status: Former     Packs/day: 1.00     Years: 10.00     Additional pack years: 0.00     Total pack years: 10.00     Types: Cigarettes     Quit date: 2006     Years since quittin.8     Passive exposure: Past    Smokeless tobacco: Never    Tobacco comments:     Passive exposure in childhood and adult homes.    Vaping Use    Vaping Use: Never used   Substance Use Topics    Alcohol use: Yes     Alcohol/week: 2.5 standard drinks of alcohol     Types: 3 Standard drinks or equivalent per week     Comment: 2 per day    Drug use: No       Review of Systems:  See HPI    Physical Exam     Vital signs: BP (!) 132/91   Pulse 98   Temp 97.6  F (36.4  C) (Tympanic)   Resp 18   Ht 1.651 m (5' 5\")   Wt 81.6 kg (180 lb)   LMP  (LMP Unknown)   SpO2 97%   BMI 29.95 kg/m      Physical Exam:    General: awake and alert, uncomfortable  HEENT: Abrasions to forehead and superficial laceration to nasal bridge without obvious deformity, no septal hematoma, no scleral injection, no nasal discharge, tympanic membranes clear bilaterally, neck supple without midline tenderness  Chest: clear to auscultation bilaterally without wheezes or crackles, non labored   Extremities: no deformities, edema, or tenderness  Skin: warm, dry, no rashes  Neuro: alert and oriented x 3, moving extremities x 4, 5/5 hand  and plantar/dorsiflexion bilaterally, extraocular movements " intact, pupils equal round and reactive to light, ambulates without difficulty      Medical Decision Making & ED Course     Differential includes traumatic intracranial hemorrhage, preceding subarachnoid hemorrhage, skull fracture, contusion, concussion. she has a benign neurologic exam without focal neurologic deficits. However, given the patient's active anticoagulation with Eliquis, the patient required head CT evaluation for intracranial hemorrhage. This initial CT revealed no acute intracranial process.  Given the delay in presentation this patient is safe for discharge     I have reviewed the patients imaging  and medical records.  .    Diagnosis & Disposition     Diagnosis:  1. Injury of head, initial encounter    2. Forehead abrasion, initial encounter    3. Nasal abrasion, initial encounter        Disposition:  Home    MD Migel You Theresa M, MD  11/20/23 7836

## 2023-11-20 NOTE — ED TRIAGE NOTES
"Pt states last night @ 1800 she \"tripped on my feet\" on a sidewalk and fell on her face.  Pt does have an abrasion on her forehead bridge of her nose.  Pt reports being on blood thinners and [facility] and they told her to come to the ED due to being on blood thinners.  Bleeding is controlled.  Pt's pain is a 4/10 in her head and shoulders,/hand.  Pt denies LOC after the fall.     Triage Assessment (Adult)       Row Name 11/20/23 0931          Triage Assessment    Airway WDL WDL        Respiratory WDL    Respiratory WDL WDL        Cardiac WDL    Cardiac WDL WDL        Peripheral/Neurovascular WDL    Peripheral Neurovascular WDL WDL        Cognitive/Neuro/Behavioral WDL    Cognitive/Neuro/Behavioral WDL WDL                     "

## 2023-11-20 NOTE — TELEPHONE ENCOUNTER
"Reason for Disposition    Taking Coumadin (warfarin) or other strong blood thinner, or known bleeding disorder (e.g., thrombocytopenia)    Additional Information    Negative: ACUTE NEUROLOGIC SYMPTOM and symptom present now    Negative: Knocked out (unconscious) > 1 minute    Negative: Seizure (convulsion) occurred  (Exception: Prior history of seizures and now alert and without Acute Neurologic Symptoms.)    Negative: Neck pain after dangerous injury (e.g., MVA, diving, trampoline, contact sports, fall > 10 feet or 3 meters)  (Exception: Neck pain began > 1 hour after injury.)    Negative: Major bleeding (actively dripping or spurting) that can't be stopped    Negative: Penetrating head injury (e.g., knife, gunshot wound, metal object)    Negative: Sounds like a life-threatening emergency to the triager    Negative: Diagnosed with a concussion within last 14 days    Negative: Can't remember what happened (amnesia)    Negative: Vomiting once or more    Negative: Watery or blood-tinged fluid dripping from the nose or ears    Answer Assessment - Initial Assessment Questions  1. MECHANISM: \"How did the injury happen?\" For falls, ask: \"What height did you fall from?\" and \"What surface did you fall against?\"       Fall yesterday onto concrete  2. ONSET: \"When did the injury happen?\" (Minutes or hours ago)       yesterday  3. NEUROLOGIC SYMPTOMS: \"Was there any loss of consciousness?\" \"Are there any other neurological symptoms?\"       no  4. MENTAL STATUS: \"Does the person know who they are, who you are, and where they are?\"       yes  5. LOCATION: \"What part of the head was hit?\"       face  6. SCALP APPEARANCE: \"What does the scalp look like? Is it bleeding now?\" If Yes, ask: \"Is it difficult to stop?\"       Facial wounds  7. SIZE: For cuts, bruises, or swelling, ask: \"How large is it?\" (e.g., inches or centimeters)         8. PAIN: \"Is there any pain?\" If Yes, ask: \"How bad is it?\"  (e.g., Scale 1-10; or mild, " "moderate, severe)        9. TETANUS: For any breaks in the skin, ask: \"When was the last tetanus booster?\"        10. BLOOD THINNERS: \"Do you take any blood thinners?\" (e.g., aspirin, clopidogrel / Plavix, coumadin, heparin). Notes: Other strong blood thinners include: Arixtra (fondaparinux), Eliquis (apixaban), Pradaxa (dabigatran), and Xarelto (rivaroxaban).        yes  11. OTHER SYMPTOMS: \"Do you have any other symptoms?\" (e.g., neck pain, vomiting)        none  12. PREGNANCY: \"Is there any chance you are pregnant?\" \"When was your last menstrual period?\"    Protocols used: Head Injury-A-OH    "

## 2023-11-20 NOTE — TELEPHONE ENCOUNTER
Patient called and is inquiring if she should be concerned at all after falling last night since she is on a bloodthinner. Says she feels ok and no headache    Calin Padgett on 11/20/2023 at 7:38 AM

## 2023-12-03 ENCOUNTER — MYC MEDICAL ADVICE (OUTPATIENT)
Dept: FAMILY MEDICINE | Facility: OTHER | Age: 53
End: 2023-12-03
Payer: COMMERCIAL

## 2023-12-14 ENCOUNTER — E-VISIT (OUTPATIENT)
Dept: FAMILY MEDICINE | Facility: OTHER | Age: 53
End: 2023-12-14
Payer: COMMERCIAL

## 2023-12-14 DIAGNOSIS — F41.1 ANXIETY, GENERALIZED: ICD-10-CM

## 2023-12-14 DIAGNOSIS — F33.0 MAJOR DEPRESSIVE DISORDER, RECURRENT EPISODE, MILD (H): ICD-10-CM

## 2023-12-14 PROCEDURE — 99421 OL DIG E/M SVC 5-10 MIN: CPT | Performed by: PHYSICIAN ASSISTANT

## 2023-12-14 ASSESSMENT — ANXIETY QUESTIONNAIRES
7. FEELING AFRAID AS IF SOMETHING AWFUL MIGHT HAPPEN: SEVERAL DAYS
4. TROUBLE RELAXING: NOT AT ALL
3. WORRYING TOO MUCH ABOUT DIFFERENT THINGS: SEVERAL DAYS
5. BEING SO RESTLESS THAT IT IS HARD TO SIT STILL: NOT AT ALL
6. BECOMING EASILY ANNOYED OR IRRITABLE: MORE THAN HALF THE DAYS
GAD7 TOTAL SCORE: 6
GAD7 TOTAL SCORE: 6
1. FEELING NERVOUS, ANXIOUS, OR ON EDGE: SEVERAL DAYS
2. NOT BEING ABLE TO STOP OR CONTROL WORRYING: SEVERAL DAYS

## 2023-12-14 ASSESSMENT — PATIENT HEALTH QUESTIONNAIRE - PHQ9
SUM OF ALL RESPONSES TO PHQ QUESTIONS 1-9: 10
SUM OF ALL RESPONSES TO PHQ QUESTIONS 1-9: 10
10. IF YOU CHECKED OFF ANY PROBLEMS, HOW DIFFICULT HAVE THESE PROBLEMS MADE IT FOR YOU TO DO YOUR WORK, TAKE CARE OF THINGS AT HOME, OR GET ALONG WITH OTHER PEOPLE: SOMEWHAT DIFFICULT

## 2023-12-15 RX ORDER — SERTRALINE HYDROCHLORIDE 100 MG/1
100 TABLET, FILM COATED ORAL DAILY
Qty: 90 TABLET | Refills: 1 | Status: SHIPPED | OUTPATIENT
Start: 2023-12-15 | End: 2024-05-20

## 2023-12-15 ASSESSMENT — ANXIETY QUESTIONNAIRES: GAD7 TOTAL SCORE: 6

## 2023-12-15 ASSESSMENT — PATIENT HEALTH QUESTIONNAIRE - PHQ9: SUM OF ALL RESPONSES TO PHQ QUESTIONS 1-9: 10

## 2023-12-15 NOTE — PATIENT INSTRUCTIONS
Thank you for choosing us for your care. I have placed an order for a prescription so that you can start treatment. View your full visit summary for details by clicking on the link below. Your pharmacist will able to address any questions you may have about the medication.      If you're not feeling better within 4-6 weeks please schedule an appointment.  You can schedule an appointment right here in [facility], or call [phone]  If the visit is for the same symptoms as your eVisit, we'll refund the cost of your eVisit if seen within seven days.    Depression and Chronic Disease: Care Instructions  Overview     A chronic disease is one that you have for a long time. Some chronic diseases can be managed, but they usually cannot be cured. Depression is common in people with chronic diseases.  If you have depression, it's not your fault. Depression is a common mental health condition. And it may get better with treatment. Medicines, counseling, and self-care can all help.  Follow-up care is a key part of your treatment and safety. Be sure to make and go to all appointments, and call your doctor if you are having problems. It's also a good idea to know your test results and keep a list of the medicines you take.  How can you care for yourself at home?  Watch for symptoms of depression  The symptoms of depression are often subtle at first. You may think they are caused by your disease rather than depression. Or you may think it is normal to be depressed when you have a chronic disease.  If you are depressed you may:  Feel sad or hopeless.  Feel guilty or worthless.  Not enjoy the things you used to enjoy.  Feel hopeless, as though life is not worth living.  Have trouble thinking or remembering.  Have low energy, and you may not eat or sleep well.  Pull away from others.  Think often about death or killing yourself.  Get treatment  By treating your depression, you can feel more hopeful and have more energy. If you feel  better, you may take better care of yourself, so your health may improve.  Talk to your doctor if you have any changes in mood during treatment for your disease.  Ask your doctor for help. Counseling, antidepressant medicine, or a combination of the two can help most people with depression. Often a combination works best. Counseling can also help you cope with having a chronic disease.  Where to get help 24 hours a day, 7 days a week   If you or someone you know talks about suicide, self-harm, a mental health crisis, a substance use crisis, or any other kind of emotional distress, get help right away. You can:  Call the Suicide and Crisis Lifeline at 988.  Call 8-556-420-TALK (1-694.166.5633).  Text HOME to 264584 to access the Crisis Text Line.  Consider saving these numbers in your phone.  Go to [employer] for more information or to chat online.  When should you call for help?   Call 061 anytime you think you may need emergency care. For example, call if:    You feel like hurting yourself or someone else.     Someone you know has depression and is about to attempt or is attempting suicide.   Where to get help 24 hours a day, 7 days a week   If you or someone you know talks about suicide, self-harm, a mental health crisis, a substance use crisis, or any other kind of emotional distress, get help right away. You can:    Call the Suicide and Crisis Lifeline at 988.     Call 4-611-944-TALK (1-436.818.3054).     Text HOME to 237771 to access the Crisis Text Line.   Consider saving these numbers in your phone.  Go to [employer] for more information or to chat online.  Call your doctor now or seek immediate medical care if:    You hear voices.     Someone you know has depression and:  Starts to give away possessions.  Uses illegal drugs or drinks alcohol heavily.  Talks or writes about death, including writing suicide notes or talking about guns, knives, or pills.  Starts to spend a lot of time alone.  Acts  "very aggressively or suddenly appears calm.   Watch closely for changes in your health, and be sure to contact your doctor if:    You do not get better as expected.   Where can you learn more?  Go to https://www.[employer].net/patiented  Enter A548 in the search box to learn more about \"Depression and Chronic Disease: Care Instructions.\"  Current as of: June 25, 2023               Content Version: 13.8    4816-1042 [employer].   Care instructions adapted under license by your healthcare professional. If you have questions about a medical condition or this instruction, always ask your healthcare professional. [employer] disclaims any warranty or liability for your use of this information.                   My Depression Action Plan  Name: Johanne Fish   Date of Birth 1970  Date: 12/15/2023    My doctor: Zuly Dyer   My clinic: [facility]  [address]  [place] [address]  [phone]          GREEN    ZONE   Good Control    What it looks like:   Things are going generally well. You have normal ups and downs. You may even feel depressed from time to time, but bad moods usually last less than a day.   What you need to do:  Continue to care for yourself (see self care plan)  Check your depression survival kit and update it as needed  Follow your physician s recommendations including any medication.  Do not stop taking medication unless you consult with your physician first.           YELLOW         ZONE Getting Worse    What it looks like:   Depression is starting to interfere with your life.   It may be hard to get out of bed; you may be starting to isolate yourself from others.  Symptoms of depression are starting to last most all day and this has happened for several days.   You may have suicidal thoughts but they are not constant.   What you need to do:     Call your care team. Your response to treatment will improve if you keep your " care team informed of your progress. Yellow periods are signs an adjustment may need to be made.     Continue your self-care.  Just get dressed and ready for the day.  Don't give yourself time to talk yourself out of it.    Talk to someone in your support network.    Open up your Depression Self-Care Plan/Wellness Kit.           RED    ZONE Medical Alert - Get Help    What it looks like:   Depression is seriously interfering with your life.   You may experience these or other symptoms: You can t get out of bed most days, can t work or engage in other necessary activities, you have trouble taking care of basic hygiene, or basic responsibilities, thoughts of suicide or death that will not go away, self-injurious behavior.     What you need to do:  Call your care team and request a same-day appointment. If they are not available (weekends or after hours) call your local crisis line, emergency room or 911.          Depression Self-Care Plan / Wellness Kit    Many people find that medication and therapy are helpful treatments for managing depression. In addition, making small changes to your everyday life can help to boost your mood and improve your wellbeing. Below are some tips for you to consider. Be sure to talk with your medical provider and/or behavioral health consultant if your symptoms are worsening or not improving.     Sleep   Sleep hygiene  means all of the habits that support good, restful sleep. It includes maintaining a consistent bedtime and wake time, using your bedroom only for sleeping or sex, and keeping the bedroom dark and free of distractions like a computer, smartphone, or television.     Develop a Healthy Routine  Maintain good hygiene. Get out of bed in the morning, make your bed, brush your teeth, take a shower, and get dressed. Don t spend too much time viewing media that makes you feel stressed. Find time to relax each day.    Exercise  Get some form of exercise every day. This will help  reduce pain and release endorphins, the  feel good  chemicals in your brain. It can be as simple as just going for a walk or doing some gardening, anything that will get you moving.      Diet  Strive to eat healthy foods, including fruits and vegetables. Drink plenty of water. Avoid excessive sugar, caffeine, alcohol, and other mood-altering substances.     Stay Connected with Others  Stay in touch with friends and family members.    Manage Your Mood  Try deep breathing, massage therapy, biofeedback, or meditation. Take part in fun activities when you can. Try to find something to smile about each day.     Psychotherapy  Be open to working with a therapist if your provider recommends it.     Medication  Be sure to take your medication as prescribed. Most anti-depressants need to be taken every day. It usually takes several weeks for medications to work. Not all medicines work for all people. It is important to follow-up with your provider to make sure you have a treatment plan that is working for you. Do not stop your medication abruptly without first discussing it with your provider.    Crisis Resources   These hotlines are for both adults and children. They and are open 24 hours a day, 7 days a week unless noted otherwise.    National Suicide Prevention Lifeline   988 or 9-308-468-SDGV (0365)    Crisis Text Line    www.crisistextline.org  Text HOME to 255932 from anywhere in the [place], anytime, about any type of crisis. A live, trained crisis counselor will receive the text and respond quickly.    [place] Lifeline for LGBTQ Youth  A national crisis intervention and suicide lifeline for LGBTQ youth under 25. Provides a safe place to talk without judgement. Call 1-408.519.3100; text START to 178483 or visit www.thePassKitvorproject.org to talk to a trained counselor.    For [facility] crisis numbers, visit the [place] DHS website  at:  https://[place].gov/dhs/people-we-serve/adults/health-care/mental-health/resources/crisis-contacts.jsp    Learning About Anxiety Disorders  What are anxiety disorders?     Anxiety disorders are a type of medical problem. They cause severe anxiety. When you feel anxious, you feel that something bad is about to happen. This feeling interferes with your life.  These disorders include:  Generalized anxiety disorder. You feel worried and stressed about many everyday events and activities. This goes on for several months and disrupts your life on most days.  Panic disorder. You have repeated panic attacks. A panic attack is a sudden, intense fear or anxiety. It may make you feel short of breath. Your heart may pound.  Social anxiety disorder. You feel very anxious about what you will say or do in front of people. For example, you may be scared to talk or eat in public. This problem affects your daily life.  Phobias. You are very scared of a specific object, situation, or activity. For example, you may fear spiders, high places, or small spaces.  What are the symptoms?  Generalized anxiety disorder  Symptoms may include:  Feeling worried and stressed about many things almost every day.  Feeling tired or irritable. You may have a hard time concentrating.  Having headaches or muscle aches.  Having a hard time getting to sleep or staying asleep.  Panic disorder  You may have repeated panic attacks when there is no reason for feeling afraid. You may change your daily activities because you worry that you will have another attack.  Symptoms may include:  Intense fear, terror, or anxiety.  Trouble breathing or very fast breathing.  Chest pain or tightness.  A heartbeat that races or is not regular.  Social anxiety disorder  Symptoms may include:  Fear about a social situation, such as eating in front of others or speaking in public. You may worry a lot. Or you may be afraid that something bad will happen.  Anxiety that can cause  "you to blush, sweat, and feel shaky.  A heartbeat that is faster than normal.  A hard time focusing.  Phobias  Symptoms may include:  More fear than most people of being around an object, being in a situation, or doing an activity. You might also be stressed about the chance of being around the thing you fear.  Worry about losing control, panicking, fainting, or having physical symptoms like a faster heartbeat when you are around the situation or object.  How are these disorders treated?  Anxiety disorders can be treated with medicines or counseling. A combination of both may be used.  Medicines may include:  Antidepressants. These may help your symptoms by keeping chemicals in your brain in balance.  Benzodiazepines. These may give you short-term relief of your symptoms.  Some people use cognitive-behavioral therapy. A therapist helps you learn to change stressful or bad thoughts into helpful thoughts.  Lead a healthy lifestyle  A healthy lifestyle may help you feel better.  Get at least 30 minutes of exercise on most days of the week. Walking is a good choice.  Eat a healthy diet. Include fruits, vegetables, lean proteins, and whole grains in your diet each day.  Try to go to bed at the same time every night. Try for 8 hours of sleep a night.  Find ways to manage stress. Try relaxation exercises.  Avoid alcohol and illegal drugs.  Follow-up care is a key part of your treatment and safety. Be sure to make and go to all appointments, and call your doctor if you are having problems. It's also a good idea to know your test results and keep a list of the medicines you take.  Where can you learn more?  Go to https://www.[employer].net/patiented  Enter K667 in the search box to learn more about \"Learning About Anxiety Disorders.\"  Current as of: June 25, 2023               Content Version: 13.8    1630-6685 [employer].   Care instructions adapted under license by your healthcare professional. If you have " questions about a medical condition or this instruction, always ask your healthcare professional. [employer] disclaims any warranty or liability for your use of this information.

## 2023-12-28 ENCOUNTER — OFFICE VISIT (OUTPATIENT)
Dept: FAMILY MEDICINE | Facility: OTHER | Age: 53
End: 2023-12-28
Attending: STUDENT IN AN ORGANIZED HEALTH CARE EDUCATION/TRAINING PROGRAM
Payer: COMMERCIAL

## 2023-12-28 ENCOUNTER — HOSPITAL ENCOUNTER (OUTPATIENT)
Dept: ULTRASOUND IMAGING | Facility: OTHER | Age: 53
Discharge: HOME | End: 2023-12-28
Attending: STUDENT IN AN ORGANIZED HEALTH CARE EDUCATION/TRAINING PROGRAM
Payer: COMMERCIAL

## 2023-12-28 ENCOUNTER — NURSE TRIAGE (OUTPATIENT)
Dept: FAMILY MEDICINE | Facility: OTHER | Age: 53
End: 2023-12-28

## 2023-12-28 VITALS
HEIGHT: 65 IN | SYSTOLIC BLOOD PRESSURE: 123 MMHG | DIASTOLIC BLOOD PRESSURE: 78 MMHG | BODY MASS INDEX: 32.15 KG/M2 | WEIGHT: 193 LBS | HEART RATE: 92 BPM | RESPIRATION RATE: 16 BRPM | OXYGEN SATURATION: 99 % | TEMPERATURE: 98.2 F

## 2023-12-28 DIAGNOSIS — T14.8XXA HEMATOMA OF SKIN: ICD-10-CM

## 2023-12-28 DIAGNOSIS — Z86.718 PERSONAL HISTORY OF DVT (DEEP VEIN THROMBOSIS): ICD-10-CM

## 2023-12-28 DIAGNOSIS — R22.42 LOCALIZED SWELLING OF LEFT LOWER LEG: Primary | ICD-10-CM

## 2023-12-28 DIAGNOSIS — R22.42 LOCALIZED SWELLING OF LEFT LOWER LEG: ICD-10-CM

## 2023-12-28 PROCEDURE — 93971 EXTREMITY STUDY: CPT | Mod: LT

## 2023-12-28 PROCEDURE — 99213 OFFICE O/P EST LOW 20 MIN: CPT | Performed by: STUDENT IN AN ORGANIZED HEALTH CARE EDUCATION/TRAINING PROGRAM

## 2023-12-28 ASSESSMENT — PAIN SCALES - GENERAL: PAINLEVEL: NO PAIN (0)

## 2023-12-28 NOTE — TELEPHONE ENCOUNTER
Called patient to triage prior to appointment. Left message.    SAMANTHA LORENZO RN on 12/28/2023 at 8:27 AM

## 2023-12-28 NOTE — RESULT ENCOUNTER NOTE
Results discussed directly with patient while patient was present. Any further details documented in the note.   Cornelius Sands MD

## 2023-12-28 NOTE — PROGRESS NOTES
"  Assessment & Plan   Problem List Items Addressed This Visit    None  Visit Diagnoses       Localized swelling of left lower leg    -  Primary    Relevant Orders    US Lower Extremity Venous Duplex Left (Completed)    Personal history of DVT (deep vein thrombosis)        Hematoma of skin               Given her history of recurrent clots and swelling, ordered stat DVT US to assess for clot.   No DVT but does have hematomas vs seromas vs ruptured baker cyst fluid. Mostly likely hematoma based on mechanism of injury and on blood thinners. Discussed supportive cares with compression, ice, elevation, tylenol, time. Small red area but does not appear to be cellulitis of infection though was given strict return precautions   Ok to continue eliquis             BMI:   Estimated body mass index is 32.12 kg/m  as calculated from the following:    Height as of this encounter: 1.651 m (5' 5\").    Weight as of this encounter: 87.5 kg (193 lb).           No follow-ups on file.    Cornelius Sands MD  [facility]    Subjective   Sharno is a 53 year old, presenting for the following health issues:  Leg Problem      History of Present Illness       Reason for visit:  Injury  Symptom onset:  More than a month  Symptom intensity:  Mild  Symptom progression:  Staying the same  Had these symptoms before:  No    She eats 2-3 servings of fruits and vegetables daily.She consumes 0 sweetened beverage(s) daily.She exercises with enough effort to increase her heart rate 9 or less minutes per day.  She exercises with enough effort to increase her heart rate 3 or less days per week.   She is taking medications regularly.     Left leg swelling  - over a month ago tripped and hit left shin  - painful, swollen, red spot   - history of blood clots   - on eliquis, no missed doses   - has had clots while on blood thinners                 Review of Systems   Constitutional, HEENT, cardiovascular, pulmonary, gi and gu systems " "are negative, except as otherwise noted.      Objective    /78   Pulse 92   Temp 98.2  F (36.8  C) (Oral)   Resp 16   Ht 1.651 m (5' 5\")   Wt 87.5 kg (193 lb)   LMP  (LMP Unknown)   SpO2 99%   BMI 32.12 kg/m    Body mass index is 32.12 kg/m .  Physical Exam   GENERAL: healthy, alert and no distress  RESP: lungs clear to auscultation - no rales, rhonchi or wheezes  CV: regular rate and rhythm, normal S1 S2, no S3 or S4, no murmur, click or rub, no peripheral edema and peripheral pulses strong  MS: left lower leg swelling compared to right. At least three palpable nodules left shin that are tender.   SKIN: slight erythema left shin without significant warmth   PSYCH: mentation appears normal, affect normal/bright    US Lower Extremity Venous Duplex Left    Result Date: 12/28/2023  US LOWER EXTREMITY VENOUS DUPLEX LEFT 12/28/2023 2:24 PM History:Female, age 53 years; ; history of recurrent DVTs left leg. On eliquis. Left lower leg swelling, redness, pain. Lumps (hematomas??); Localized swelling of left lower leg Comparison: No relevant prior imaging. Technique: Grayscale and color Doppler ultrasound of the left lower extremity deep venous structures. Findings: The deep venous structures are patent and fully compressible. There is normal augmentation of flow.     Impression: No evidence of DVT. Subcutaneous fluid collection in the area of discomfort suggesting the remnants of a hematoma, seroma or perhaps a large Dubon's cyst. MARAH POWELL MD   SYSTEM ID:  [id number]                   "

## 2023-12-28 NOTE — TELEPHONE ENCOUNTER
S-(situation): on or around Nov 10th, patient fell and skinned her shin. She states she just tripped over her own feet.     B-(background): Hx of DVT in that leg so swelling in that leg is always worse. Takes Eliquis daily.     A-(assessment): Initially noticed bruising.  She noticed a few bumps in her leg on the shin above ankle. She says they are like little balls that she can move around in her leg. Almost feels a little numb on the top of her leg. Sore to the touch. She has a reddened area that is quarter sized that she just noticed a week or so ago. Still has swelling.   Can't say that it does or doesn't feel like a previous DVT. She is concerned about the red spot and still having discomfort.     R-(recommendations): Patient being seen in clinic today at 1:20. Does she need imaging prior?    SAMANTHA LORENZO RN on 12/28/2023 at 10:17 AM

## 2023-12-28 NOTE — NURSING NOTE
Patient presents for left lower leg problem. Swelling, redness, and reports bumps that feel like they are under the skin, patient states that symptoms started aftyer a fall happening around thanksgiving.

## 2024-01-21 ENCOUNTER — MYC REFILL (OUTPATIENT)
Dept: FAMILY MEDICINE | Facility: OTHER | Age: 54
End: 2024-01-21
Payer: COMMERCIAL

## 2024-01-21 DIAGNOSIS — I82.412 ACUTE DEEP VEIN THROMBOSIS (DVT) OF FEMORAL VEIN OF LEFT LOWER EXTREMITY (H): ICD-10-CM

## 2024-01-22 NOTE — TELEPHONE ENCOUNTER
Patient comment: I only have 1 week left. I take 2 per day.....probably for the rest of my life     Requested Prescriptions   Pending Prescriptions Disp Refills    apixaban ANTICOAGULANT (ELIQUIS) 5 MG tablet 388 tablet 0     Sig: Take 2 tablets (10 mg) by mouth 2 times daily for 7 days, THEN 1 tablet (5 mg) 2 times daily for 180 days.       Anticoagulant Agents Failed - 1/22/2024  9:10 AM        Failed - Medication is active on med list        Failed - GFR on file in the past 12 months and most recent GFR is normal     Last Prescription Date:   7/5/23  Last Fill Qty/Refills:         388, R-0 (End: 1/8/24)    Last Office Visit:              12/28/23   Future Office visit:           None    Unable to complete prescription refill per RN Medication Refill Policy.     Jayshree Lindsey RN .............. 1/22/2024  9:11 AM

## 2024-02-01 ENCOUNTER — HOSPITAL ENCOUNTER (OUTPATIENT)
Dept: MAMMOGRAPHY | Facility: OTHER | Age: 54
Discharge: HOME | End: 2024-02-01
Attending: FAMILY MEDICINE | Admitting: FAMILY MEDICINE
Payer: COMMERCIAL

## 2024-02-01 DIAGNOSIS — Z12.31 VISIT FOR SCREENING MAMMOGRAM: ICD-10-CM

## 2024-02-01 PROCEDURE — 77063 BREAST TOMOSYNTHESIS BI: CPT

## 2024-04-05 ENCOUNTER — ONCOLOGY VISIT (OUTPATIENT)
Dept: ONCOLOGY | Facility: OTHER | Age: 54
End: 2024-04-05
Attending: INTERNAL MEDICINE
Payer: COMMERCIAL

## 2024-04-05 VITALS
WEIGHT: 200 LBS | DIASTOLIC BLOOD PRESSURE: 78 MMHG | HEART RATE: 80 BPM | BODY MASS INDEX: 33.28 KG/M2 | RESPIRATION RATE: 16 BRPM | OXYGEN SATURATION: 96 % | TEMPERATURE: 96.9 F | SYSTOLIC BLOOD PRESSURE: 122 MMHG

## 2024-04-05 DIAGNOSIS — I26.99 PULMONARY EMBOLISM, OTHER, UNSPECIFIED CHRONICITY, UNSPECIFIED WHETHER ACUTE COR PULMONALE PRESENT (H): Primary | ICD-10-CM

## 2024-04-05 DIAGNOSIS — Z86.718 HISTORY OF DEEP VENOUS THROMBOSIS: ICD-10-CM

## 2024-04-05 PROCEDURE — 99214 OFFICE O/P EST MOD 30 MIN: CPT | Performed by: INTERNAL MEDICINE

## 2024-04-05 ASSESSMENT — PAIN SCALES - GENERAL: PAINLEVEL: NO PAIN (0)

## 2024-04-05 NOTE — PROGRESS NOTES
HEMATOLOGY FOLLOW-UP NOTE   Apr 5, 2024    Reason for follow-up: DVT and PE      HISTORY OF PRESENT ILLNESS  Johanne Fish is a 53 year old female with with PMH as stated below who is seen in the hematology clinic for  diagnosis of DVT and PE    Her history in chart is as follows:    Ms. Fish was diagnosed with a left lower extremity DVT in 2010 1/25/2010: Left lower extremity venous duplex ultrasound: Deep  venous thrombosis in the left popliteal vein and peroneal vein.  The other veins of the left lower extremity are negative for DVT.      At that time she was treated with warfarin for around 3 months.  She does not remember any clear provoking factors however she may have been on hormone birth control at that time    She again developed swelling and pain in her left leg in March 2023    3/27/2023: Ultrasound lower extremity venous duplex left positive for DVT.  Occlusive thrombus in the distal femoral vein as well as throughout the popliteal vein into the posterior tibial veins.    She was started on Eliquis.  She had been having dyspnea on exertion since October however this worsened 1 week after she was diagnosed with a lower extremity DVT.  She was already on Eliquis at that time.  She subsequently had a CT PE done which showed    4/3/2023: CT chest pulmonary embolism with contrast: Positive for pulmonary emboli as described above. No distinct  evidence of evolving pulmonary infarction.    Her shortness of breath has improved since being on Eliquis.  She continues to have chronic leg swelling in her left leg.  She has been having swelling since her initial DVT diagnosis in 2010.  Again no clear provoking factors prior to this diagnosis of DVT.  No prolonged travel, prolonged illness or surgery.  She has not been on birth control or hormone replacement therapy since 2010.      Has had 2 full-term pregnancies in the past.  No history of pregnancy losses or blood clots or preeclampsia with  pregnancy    Family history: Family history of blood clots in her father in his 40s to 50s.  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 No evidence of apparent retroperitoneal mass. Radiodense calculi of the right kidney. Additional nonacute findings as described above.    Interim history:    She is doing well.  Denies any leg pain.  Still continues to have left leg swelling on and off.  She had a repeat left lower extremity ultrasound which did not show any DVT.  She recently fell and does have a small bump on her left leg currently.  Otherwise is doing well denies any shortness of breath or chest pain.  She met with vascular and no intervention was recommended for the May Thurner anatomy seen on CT abdomen.  Denies any blood in urine or stools.    REVIEW OF SYSTEMS  A 12-point ROS negative except as in HPI      Current Outpatient Medications   Medication Sig Dispense Refill    apixaban ANTICOAGULANT (ELIQUIS) 5 MG tablet Take 1 tablet (5mg) by mouth 2 times daily 180 tablet 1    sertraline (ZOLOFT) 100 MG tablet Take 1 tablet (100 mg) by mouth daily 90 tablet 1       No Known Allergies  Immunization History   Administered Date(s) Administered    COVID-19 Bivalent 18+ (Moderna) 02/01/2023    COVID-19 Monovalent 18+ (Moderna) 02/05/2021, 03/05/2021, 11/15/2021    DTaP, Unspecified 02/16/2006    Flu, Unspecified 11/04/2011    Influenza (IIV3) PF 10/18/2012, 11/06/2013    Influenza Vaccine >6 months,quad, PF 09/11/2014, 10/15/2015, 11/17/2016, 11/01/2017, 11/14/2018, 10/23/2019, 10/28/2020, 11/03/2021, 10/12/2022, 10/18/2023    TDAP Vaccine (Boostrix) 01/25/2017    Twinrix A/B 10/21/2014, 11/25/2014, 06/30/2015       Past  Medical History:   Diagnosis Date    Dysplasia of cervix uteri     2001,abnormal Paps, treated by loop excision.    Lichen sclerosus et atrophicus     2005,external genital biopsy    Low back pain     Intermittent    Other premature depolarization     No Comments Provided    Phlebitis and thrombophlebitis of other deep vessels of unspecified lower extremity (H)     1/23/2010       Past Surgical History:   Procedure Laterality Date    BIOPSY BREAST  01/1997    benign disease.    BIOPSY BREAST  2012    Benign  Stereotactic R breast Bx [Other][    COLONOSCOPY N/A 09/28/2020    F/U 2030 hyperplastic and scattered diverticulosis    CONIZATION LEEP      9/2001,Loop    LAPAROSCOPIC ABLATION ENDOMETRIOSIS      07/20/2010    TONSILLECTOMY, ADENOIDECTOMY, COMBINED      No Comments Provided       SOCIAL HISTORY  History   Smoking Status    Former    Packs/day: 1.00    Years: 10.00    Types: Cigarettes    Quit date: 1/1/2006   Smokeless Tobacco    Never    Social History    Substance and Sexual Activity      Alcohol use: Yes        Alcohol/week: 2.5 standard drinks of alcohol        Types: 3 Standard drinks or equivalent per week        Comment: 2 per day     History   Drug Use No       FAMILY HISTORY  Family History   Problem Relation Age of Onset    Lung Cancer Mother     Hyperlipidemia Mother     Heart Disease Father         Heart Disease,Congestive heart failure    Ovarian Cancer Sister     Lung Cancer Sister     Lung Cancer Sister     Thyroid Disease Sister     Other - See Comments Sister     Cancer Brother     Breast Cancer Maternal Grandmother         Cancer-breast    Other - See Comments Other        PHYSICAL EXAMINATION  LMP  (LMP Unknown)   Wt Readings from Last 2 Encounters:   12/28/23 87.5 kg (193 lb)   11/20/23 81.6 kg (180 lb)     Physical Exam  Constitutional:       Appearance: Normal appearance.   Pulmonary:      Effort: Pulmonary effort is normal.   Musculoskeletal:         General: Swelling (Left lower leg)  present.      Right lower leg: No edema.   Neurological:      General: No focal deficit present.      Mental Status: She is alert and oriented to person, place, and time.       Laboratory and imagin2023: Echocardiogram:Global and regional left ventricular function is hyperkinetic with an EF of  65-70%.  Global right ventricular function is normal. The right ventricle is normal  size.  No significant valvular abnormalities.  The estimated PA systolic pressure is 32 mmHg.    ASSESSMENT AND PLAN    1.  DVT and PE:      History of left lower extremity DVT diagnosed 3/2023.  1 week later she was diagnosed with embolism.  Currently on Eliquis.  Symptoms have improved on Eliquis with improvement in shortness of breath  No clear provoking factors.  Prior history of left lower extremity DVT in .  At that time she was on hormone birth control and was told to stop.  No history of pregnancy losses or blood clots during pregnancy    Family history of blood clots in father  During initial consultation discussed with Ms. Fish that as this is her second instance of DVT and and history of PE, no clear provoking factors, she should be on long-term anticoagulation.  I I had  also discussed hypercoagulable work-up.  Although it will not  in her case as she I think is a candidate already for long-term anticoagulation.  If she does have an inheritable mutation or deficiency then this could lead to cascade testing as female family members with similar mutation or deficiency should not be on hormonal birth control or hormone replacement.    Hypercoagulable workup showed that she has heterozygous factor V Leiden mutation.  No mutation for prothrombin gene mutation.  Antithrombin 3 level, protein C, protein S, cardiolipin antibodies IgG and IgM and beta-2 glycoprotein IgG and IgM were all within normal limits.  Could not test for lupus anticoagulant as she is on Eliquis.    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  It also showed slight mass effect on the left common iliac vein by the right common iliac artery.  Therefore she was referred to vascular surgery for consultation for evaluation for stenting.  However no intervention was recommended by vascular.    She overall has been doing very well.  She continues to have left lower extremity swelling.  Had a repeat duplex ultrasound of the lower extremity done 12/2023 which did not show any DVT.  At this time I would recommend she continue with 5 mg twice daily.  We can switch her to 2.5 mg twice daily when she runs out of her prescription in 3 months.  She will let us know when this is about to happen.  She understands she should be on long-term anticoagulation.  She should also continue with age-appropriate cancer screening.    Follow-up in clinic in 1 year.    Total time spent on the patient on day of encounter was 30 minutes doing chart review, review of test results, interpretation of results, patient visit and documentation.      Deja Whelan MD

## 2024-04-05 NOTE — NURSING NOTE
"Oncology Rooming Note    April 5, 2024 10:30 AM   Johanne Fish is a 53 year old female who presents for:    Chief Complaint   Patient presents with    Hematology     DVT, LLE     Initial Vitals: /78 (BP Location: Right arm, Patient Position: Sitting, Cuff Size: Adult Large)   Pulse 80   Temp 96.9  F (36.1  C) (Tympanic)   Resp 16   Wt 90.7 kg (200 lb)   LMP 04/01/2014 (Approximate)   SpO2 96%   BMI 33.28 kg/m   Estimated body mass index is 33.28 kg/m  as calculated from the following:    Height as of 12/28/23: 1.651 m (5' 5\").    Weight as of this encounter: 90.7 kg (200 lb). Body surface area is 2.04 meters squared.  No Pain (0) Comment: Data Unavailable   Patient's last menstrual period was 04/01/2014 (approximate).  Allergies reviewed: Yes  Medications reviewed: Yes    Medications: Medication refills not needed today. Still has a refill.  Pharmacy name entered into [facility]:    [facility] - [place], [place] - [address] [facility] - [place], [place] - [address]    Frailty Screening:   Is the patient here for a new oncology consult visit in cancer care? 2. No      Clinical concerns: nothing specific     Usha Garcia CMA ([facility]) 4/5/2024 10:30 AM  "

## 2024-05-15 ENCOUNTER — MYC MEDICAL ADVICE (OUTPATIENT)
Dept: FAMILY MEDICINE | Facility: OTHER | Age: 54
End: 2024-05-15
Payer: COMMERCIAL

## 2024-05-15 DIAGNOSIS — E66.811 CLASS 1 OBESITY WITHOUT SERIOUS COMORBIDITY WITH BODY MASS INDEX (BMI) OF 32.0 TO 32.9 IN ADULT, UNSPECIFIED OBESITY TYPE: Primary | ICD-10-CM

## 2024-05-20 ENCOUNTER — HOSPITAL ENCOUNTER (OUTPATIENT)
Dept: GENERAL RADIOLOGY | Facility: OTHER | Age: 54
Discharge: HOME | End: 2024-05-20
Attending: PHYSICIAN ASSISTANT
Payer: COMMERCIAL

## 2024-05-20 ENCOUNTER — OFFICE VISIT (OUTPATIENT)
Dept: FAMILY MEDICINE | Facility: OTHER | Age: 54
End: 2024-05-20
Attending: PHYSICIAN ASSISTANT
Payer: COMMERCIAL

## 2024-05-20 VITALS
OXYGEN SATURATION: 99 % | HEART RATE: 96 BPM | WEIGHT: 205.6 LBS | RESPIRATION RATE: 16 BRPM | TEMPERATURE: 97.2 F | DIASTOLIC BLOOD PRESSURE: 88 MMHG | BODY MASS INDEX: 34.21 KG/M2 | SYSTOLIC BLOOD PRESSURE: 132 MMHG

## 2024-05-20 DIAGNOSIS — F41.1 ANXIETY, GENERALIZED: ICD-10-CM

## 2024-05-20 DIAGNOSIS — R10.9 SIDE PAIN: Primary | ICD-10-CM

## 2024-05-20 DIAGNOSIS — F33.0 MAJOR DEPRESSIVE DISORDER, RECURRENT EPISODE, MILD (H): ICD-10-CM

## 2024-05-20 DIAGNOSIS — I82.412 ACUTE DEEP VEIN THROMBOSIS (DVT) OF FEMORAL VEIN OF LEFT LOWER EXTREMITY (H): ICD-10-CM

## 2024-05-20 DIAGNOSIS — I26.94 MULTIPLE SUBSEGMENTAL PULMONARY EMBOLI WITHOUT ACUTE COR PULMONALE (H): ICD-10-CM

## 2024-05-20 LAB
ALBUMIN UR-MCNC: NEGATIVE MG/DL
APPEARANCE UR: CLEAR
BILIRUB UR QL STRIP: NEGATIVE
COLOR UR AUTO: YELLOW
GLUCOSE UR STRIP-MCNC: NEGATIVE MG/DL
HGB UR QL STRIP: NEGATIVE
KETONES UR STRIP-MCNC: NEGATIVE MG/DL
LEUKOCYTE ESTERASE UR QL STRIP: NEGATIVE
NITRATE UR QL: NEGATIVE
PH UR STRIP: 7 [PH] (ref 5–9)
SP GR UR STRIP: 1.01 (ref 1–1.03)
UROBILINOGEN UR STRIP-MCNC: NORMAL MG/DL

## 2024-05-20 PROCEDURE — 72100 X-RAY EXAM L-S SPINE 2/3 VWS: CPT

## 2024-05-20 PROCEDURE — 81003 URINALYSIS AUTO W/O SCOPE: CPT | Mod: ZL | Performed by: PHYSICIAN ASSISTANT

## 2024-05-20 PROCEDURE — 99214 OFFICE O/P EST MOD 30 MIN: CPT | Performed by: PHYSICIAN ASSISTANT

## 2024-05-20 RX ORDER — BUPROPION HYDROCHLORIDE 75 MG/1
75 TABLET ORAL EVERY MORNING
COMMUNITY
Start: 2024-04-25

## 2024-05-20 ASSESSMENT — PAIN SCALES - GENERAL: PAINLEVEL: EXTREME PAIN (8)

## 2024-05-20 ASSESSMENT — ANXIETY QUESTIONNAIRES
7. FEELING AFRAID AS IF SOMETHING AWFUL MIGHT HAPPEN: SEVERAL DAYS
7. FEELING AFRAID AS IF SOMETHING AWFUL MIGHT HAPPEN: SEVERAL DAYS
GAD7 TOTAL SCORE: 6
1. FEELING NERVOUS, ANXIOUS, OR ON EDGE: MORE THAN HALF THE DAYS
IF YOU CHECKED OFF ANY PROBLEMS ON THIS QUESTIONNAIRE, HOW DIFFICULT HAVE THESE PROBLEMS MADE IT FOR YOU TO DO YOUR WORK, TAKE CARE OF THINGS AT HOME, OR GET ALONG WITH OTHER PEOPLE: SOMEWHAT DIFFICULT
2. NOT BEING ABLE TO STOP OR CONTROL WORRYING: SEVERAL DAYS
5. BEING SO RESTLESS THAT IT IS HARD TO SIT STILL: NOT AT ALL
GAD7 TOTAL SCORE: 6
4. TROUBLE RELAXING: NOT AT ALL
3. WORRYING TOO MUCH ABOUT DIFFERENT THINGS: SEVERAL DAYS
8. IF YOU CHECKED OFF ANY PROBLEMS, HOW DIFFICULT HAVE THESE MADE IT FOR YOU TO DO YOUR WORK, TAKE CARE OF THINGS AT HOME, OR GET ALONG WITH OTHER PEOPLE?: SOMEWHAT DIFFICULT
GAD7 TOTAL SCORE: 6
6. BECOMING EASILY ANNOYED OR IRRITABLE: SEVERAL DAYS

## 2024-05-20 ASSESSMENT — PATIENT HEALTH QUESTIONNAIRE - PHQ9
SUM OF ALL RESPONSES TO PHQ QUESTIONS 1-9: 10
10. IF YOU CHECKED OFF ANY PROBLEMS, HOW DIFFICULT HAVE THESE PROBLEMS MADE IT FOR YOU TO DO YOUR WORK, TAKE CARE OF THINGS AT HOME, OR GET ALONG WITH OTHER PEOPLE: VERY DIFFICULT
SUM OF ALL RESPONSES TO PHQ QUESTIONS 1-9: 10

## 2024-05-20 NOTE — PROGRESS NOTES
Assessment & Plan       ICD-10-CM    1. Side pain  R10.9 UA Macroscopic with reflex to Microscopic and Culture     XR Lumbar Spine 2/3 Views      2. Acute deep vein thrombosis (DVT) of femoral vein of left lower extremity (H)  I82.412       3. Multiple subsegmental pulmonary emboli without acute cor pulmonale (H)  I26.94       4. Anxiety, generalized  F41.1       5. Major depressive disorder, recurrent episode, mild (H24)  F33.0         Left side pain x 2 to 3 weeks duration.  Updated evaluation today including UA (negative for hematuria or signs for infection), low suspicion for renal stone/abnormality based on examination findings.  We did update an x-ray of the lumbar spine as she was having mild radiculopathy symptoms including tingling and numbness to the lateral left upper leg -x-rays with questionable mild osteoarthritis of L3-L4 and L5-S1, otherwise unremarkable.  Recommend gentle stretching, heat, ice, topical therapies such as IcyHot, Bengay and/or Voltaren.  Recommend utilize Tylenol as needed, do not exceed 3000 to 4000 mg daily.  Avoid NSAIDs due to anticoagulation status. Low suspicion for intra-abdominal pathology, however, she develops abdominal pain she may benefit from further evaluation such as a CT scan -rule out diverticulitis/other abnormality.  Return precautions reviewed.   Acute DVT and PE, she is currently anticoagulated on Eliquis 5 mg daily, per review of oncology note from April 2024, after she completes her 5 mg dose she may decrease down to 2.5 mg twice daily.  She will continue to follow closely with hematology/oncology team.  Return precautions reviewed  #2.  Generalized anxiety disorder, continues to follow closely with [facility].  Stable on current regimen of Wellbutrin, has an upcoming visit on 5/23/2024.  They manage prescription refills, she is up-to-date.  Major depression, mild in nature - continues to follow closely with mental health team, no red flag symptoms today.   "Continue with Wellbutrin.    BMI  Estimated body mass index is 34.21 kg/m  as calculated from the following:    Height as of 12/28/23: 1.651 m (5' 5\").    Weight as of this encounter: 93.3 kg (205 lb 9.6 oz).   Weight management plan: Discussed healthy diet and exercise guidelines    See Patient Instructions    Return if symptoms worsen or fail to improve.    Subjective   Sharon is a 54 year old, presenting for the following health issues:  Pain (Left side abdominal )        5/20/2024     1:34 PM   Additional Questions   Roomed by don, lpn     History of Present Illness       Reason for visit:  Pain on outer left side around my waist and some numbness of the skin on my left thigh  Symptom onset:  1-2 weeks ago  Symptom intensity:  Moderate  Symptom progression:  Staying the same  Had these symptoms before:  No    She eats 2-3 servings of fruits and vegetables daily.She consumes 1 sweetened beverage(s) daily.She exercises with enough effort to increase her heart rate 9 or less minutes per day.  She exercises with enough effort to increase her heart rate 3 or less days per week.   She is taking medications regularly.     Sharon presents to the clinic today for a nagging left side pain for the last 2 to 3 weeks, this started insidiously.  She states it is hard to pinpoint whether her pain is on the front or back of her left side, as it is primarily lateral in nature.  She does report burning which is worse with yawning, when she does down this takes her breath away due to the discomfort.  She reports tingling over the outer/lateral aspect of her left upper leg, this will occasionally also feel numb to the touch.  She declines any falls, injuries and/or trauma.  No previous surgeries to abdomen or back.  She declines any urinary or bowel symptoms.  She declines any loss of bowel or bladder function or saddle anesthesia.  She is currently anticoagulated on Eliquis secondary to history of blood clots.      Her sister was " concerned that she may have a GI bleed, therefore, recommended an acid reflux medication. Sharon picked up Pepcid over the counter, but has not utilized. She did have some mild esophageal burning a few weeks ago, but this has resolved. She reports minimal to no intake of NSAIDs, no tobacco use, social alcohol use.  No additional blood thinners other than Eliquis as listed above.  She declines any abdominal pain, bloating, blood or mucus in the stool, hematemesis, unintentional weight loss, etc. Declines vaginal bleeding or discharge. Declines constipation/diarrhea. No rash. + history of kidney stone, but felt different. + History of diverticulosis, has been actively working on increasing dietary fiber.  No history of diverticulitis.    Medication changes: She follows with [facility] (virtual provider out of [place], [place]).  She has been tapered off of Zoloft, she recently took her last dose.  She was started on Wellbutrin, she is currently on 75 mg daily, she has an upcoming visit on 5/23/2024 to discuss medication change.    Blood clots: Previous diagnosis of left lower extremity DVT, she was already on Eliquis at that time, she developed shortness of breath 1 week later and was diagnosed with PE.  She continues to follow closely with hematology/oncology.  No estrogen-containing medications.  She did have a CT scan on 6/26/2023 that showed May Thurner anatomy - met with vascular surgery for this. No intervention recommended. Hypercoagulable work up revealed factor V Leiden.     Review of Systems  Constitutional, HEENT, cardiovascular, pulmonary, GI, , musculoskeletal, neuro, skin, endocrine and psych systems are negative, except as otherwise noted.        Objective    /88   Pulse 96   Temp 97.2  F (36.2  C) (Temporal)   Resp 16   Wt 93.3 kg (205 lb 9.6 oz)   LMP 04/01/2014 (Approximate)   SpO2 99%   BMI 34.21 kg/m    Body mass index is 34.21 kg/m .  Physical Exam   GENERAL: alert and no distress  EYES:  Eyes grossly normal to inspection, PERRL and conjunctivae and sclerae normal  RESP: lungs clear to auscultation - no rales, rhonchi or wheezes  CV: regular rate and rhythm, normal S1 S2, no S3 or S4, no murmur, click or rub, no peripheral edema  ABDOMEN: soft, nontender, without hepatosplenomegaly or masses, bowel sounds normal, no palpable or pulsatile masses, no bruits heard, and no palpable renal abnormalities   MS:   Thoracic/Lumbar Spine:  Inspection:    Lordosis: Normal   Kyphosis: Normal  Non tender over the following regions: thoracic spinous processes, thoracic facet joints, left parathoracic muscles, right parathoracic muscles, lumbar spinous processes, lumbar facet joints, left para lumbar muscles, right para lumbar muscles, left SI joint, right SI joint, left sciatic notch, right sciatic notch  ROM: full flexion, extension, lateral bending and rotation. + reproduction of symptoms with lateral rotation (more prominent to left)  Strength: gastrocsoleus   5/5, hamstrings  5/5, quadriceps  5/5, tibialis anterior  5/5, peroneals  5/5  Special Test: negative straight leg raises    Patient knows own name, what time of day it is and what building we are in. CNII-XII intact. Gets in and out of chair unassisted.    SKIN: no suspicious lesions or rashes  NEURO: Normal strength and tone, mentation intact and speech normal  PSYCH: mentation appears normal, affect normal/bright  LYMPH: normal ant/post cervical, supraclavicular nodes    Results for orders placed or performed in visit on 05/20/24   XR Lumbar Spine 2/3 Views     Status: None    Narrative    Exam: XR LUMBAR SPINE 2/3 VIEWS     History:Female, age 54 years, Left side/flank pain; Lumbar  radiculopathy    Comparison:  No relevant prior imaging.    Technique: Three views are submitted.    Findings: Bones are normally mineralized. No evidence of acute or  subacute fracture.  Vertebral bodies and posterior elements are well  aligned.           Impression     Impression:  No evidence of acute or subacute bony abnormality.    MARAH POWELL MD         SYSTEM ID:  [id number]   UA Macroscopic with reflex to Microscopic and Culture     Status: Normal    Specimen: Urine, Midstream   Result Value Ref Range    Color Urine Yellow Colorless, Straw, Light Yellow, Yellow    Appearance Urine Clear Clear    Glucose Urine Negative Negative mg/dL    Bilirubin Urine Negative Negative    Ketones Urine Negative Negative mg/dL    Specific Gravity Urine 1.008 1.000 - 1.030    Blood Urine Negative Negative    pH Urine 7.0 5.0 - 9.0    Protein Albumin Urine Negative Negative mg/dL    Urobilinogen Urine Normal Normal, 2.0 mg/dL    Nitrite Urine Negative Negative    Leukocyte Esterase Urine Negative Negative    Narrative    Microscopic not indicated     Signed Electronically by: Lima Fonseca PA-C

## 2024-05-20 NOTE — PATIENT INSTRUCTIONS
Urine sample is negative for infection.  It is also reassuring that there is no blood in your urine.    Your symptoms are likely due to mild arthritis in your low back.  I would recommend stretching, heat, ice, topical therapy such as IcyHot, Voltaren and/or Bengay.  You may utilize Tylenol for discomfort, avoid exceeding 3000 to 4000 mg daily.

## 2024-05-20 NOTE — NURSING NOTE
"Chief Complaint   Patient presents with    Pain     Left side abdominal        Initial /88   Pulse 96   Temp 97.2  F (36.2  C) (Temporal)   Resp 16   Wt 93.3 kg (205 lb 9.6 oz)   LMP 04/01/2014 (Approximate)   SpO2 99%   BMI 34.21 kg/m   Estimated body mass index is 34.21 kg/m  as calculated from the following:    Height as of 12/28/23: 1.651 m (5' 5\").    Weight as of this encounter: 93.3 kg (205 lb 9.6 oz).  Medication Review: complete  Gayatri Golden LPN on 5/20/2024 at 1:37 PM    "

## 2024-06-13 ENCOUNTER — OFFICE VISIT (OUTPATIENT)
Dept: FAMILY MEDICINE | Facility: OTHER | Age: 54
End: 2024-06-13
Attending: FAMILY MEDICINE
Payer: COMMERCIAL

## 2024-06-13 VITALS — WEIGHT: 205.4 LBS | BODY MASS INDEX: 34.18 KG/M2

## 2024-06-13 DIAGNOSIS — E66.811 CLASS 1 OBESITY WITHOUT SERIOUS COMORBIDITY WITH BODY MASS INDEX (BMI) OF 32.0 TO 32.9 IN ADULT, UNSPECIFIED OBESITY TYPE: ICD-10-CM

## 2024-06-13 PROCEDURE — 97802 MEDICAL NUTRITION INDIV IN: CPT | Performed by: DIETITIAN, REGISTERED

## 2024-06-13 NOTE — PROGRESS NOTES
Sharon is here today for MNT related to obesity, desired wt loss.     PCP: Zuly Dyer    Sharon has tried weight watchers with success but after COVID, there are no more meetings. She tried Keto but found it to be unrealistic to continue long term. She is interested in learning about overall nutrition improvement and ultimately wt loss. Her long term goal is 150#, shorter term 180#.     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     Dx:   Encounter Diagnosis   Name Primary?    Class 1 obesity without serious comorbidity with body mass index (BMI) of 32.0 to 32.9 in adult, unspecified obesity type      Education today: discussed estimated needs, recommended portions for those needs, increasing fiber intakes and fluid intakes. Mediterranean diet recommendations.     Materials provided: nutrition guide for wt control, Dish up a healthy meal, Mediterranean lifestyle recommendations and recipes, meal sample plans and recipes, snack ideas and smoothie recipes.     Goals/plan: wrote down 4 goals to work on for the next few weeks including increasing fiber, smoothies 3 days per week, replacing another beverage with water daily and increasing her activity by 15 min 4 days per week. She will keep a food log for ~3 days to monitor her estimated intakes and see how they align with her estimated needs.     Sharon stated understanding and had no questions at this time.     Time spent: 53 min     Dianne Swain RD on 6/13/2024 at 9:21 AM    Answers submitted by the patient for this visit:  General Questionnaire (Submitted on 6/12/2024)  Chief Complaint: Chronic problems general questions HPI Form  What is the reason for your visit today? : Weight  How many servings of fruits and vegetables do you eat daily?: 2-3  On average, how many sweetened beverages do you drink each day (Examples: soda, juice, sweet tea, etc.  Do NOT count diet or artificially sweetened beverages)?: 0  How many minutes a day do you  exercise enough to make your heart beat faster?: 9 or less  How many days a week do you exercise enough to make your heart beat faster?: 3 or less  How many days per week do you miss taking your medication?: 0

## 2024-07-22 ENCOUNTER — MYC MEDICAL ADVICE (OUTPATIENT)
Dept: ONCOLOGY | Facility: OTHER | Age: 54
End: 2024-07-22
Payer: COMMERCIAL

## 2024-07-22 DIAGNOSIS — I82.412 ACUTE DEEP VEIN THROMBOSIS (DVT) OF FEMORAL VEIN OF LEFT LOWER EXTREMITY (H): ICD-10-CM

## 2024-07-22 NOTE — TELEPHONE ENCOUNTER
[facility] sent Rx request for the following:      Requested Prescriptions   Pending Prescriptions Disp Refills    apixaban ANTICOAGULANT (ELIQUIS) 5 MG tablet 180 tablet 1     Sig: Take 1 tablet (5mg) by mouth 2 times daily       Anticoagulant Agents Failed - 7/22/2024  1:30 PM        Failed - Normal Platelets on file in past 12 months     Recent Labs   Lab Test 04/03/23  1659                  Failed - Serum creatinine less than or equal to 1.4 on file in past 12 mos     Recent Labs   Lab Test 04/03/23  1659   CR 0.86                 Failed - GFR on file in the past 12 months and most recent GFR is normal        Failed - Recent (6 mo) or future (90 days) visit within the authorizing provider's specialty          Last Prescription Date:   1/23/24  Last Fill Qty/Refills:         180, R-1    Last Office Visit:              5/20/24   Future Office visit:                Patient requesting refill on Eliquis  LOV: 4/5/24 with Dr. Whelan  Patient is due for annual review  Will route to unit 4 scheduling to call patient and help assist in scheduling appointment.    Sunitha Feliciano RN on 7/22/2024 at 1:32 PM

## 2024-10-26 SDOH — HEALTH STABILITY: PHYSICAL HEALTH: ON AVERAGE, HOW MANY MINUTES DO YOU ENGAGE IN EXERCISE AT THIS LEVEL?: 0 MIN

## 2024-10-26 SDOH — HEALTH STABILITY: PHYSICAL HEALTH: ON AVERAGE, HOW MANY DAYS PER WEEK DO YOU ENGAGE IN MODERATE TO STRENUOUS EXERCISE (LIKE A BRISK WALK)?: 0 DAYS

## 2024-10-26 ASSESSMENT — SOCIAL DETERMINANTS OF HEALTH (SDOH): HOW OFTEN DO YOU GET TOGETHER WITH FRIENDS OR RELATIVES?: MORE THAN THREE TIMES A WEEK

## 2024-10-30 ENCOUNTER — OFFICE VISIT (OUTPATIENT)
Dept: FAMILY MEDICINE | Facility: OTHER | Age: 54
End: 2024-10-30
Attending: STUDENT IN AN ORGANIZED HEALTH CARE EDUCATION/TRAINING PROGRAM
Payer: COMMERCIAL

## 2024-10-30 VITALS
BODY MASS INDEX: 34.24 KG/M2 | SYSTOLIC BLOOD PRESSURE: 120 MMHG | TEMPERATURE: 97.9 F | DIASTOLIC BLOOD PRESSURE: 76 MMHG | WEIGHT: 205.5 LBS | HEART RATE: 106 BPM | HEIGHT: 65 IN | RESPIRATION RATE: 18 BRPM | OXYGEN SATURATION: 99 %

## 2024-10-30 DIAGNOSIS — R42 LIGHTHEADEDNESS: ICD-10-CM

## 2024-10-30 DIAGNOSIS — Z86.718 HISTORY OF DVT (DEEP VEIN THROMBOSIS): ICD-10-CM

## 2024-10-30 DIAGNOSIS — Z00.00 ROUTINE GENERAL MEDICAL EXAMINATION AT A HEALTH CARE FACILITY: Primary | ICD-10-CM

## 2024-10-30 LAB
ALBUMIN SERPL BCG-MCNC: 4.5 G/DL (ref 3.5–5.2)
ALP SERPL-CCNC: 83 U/L (ref 40–150)
ALT SERPL W P-5'-P-CCNC: 27 U/L (ref 0–50)
ANION GAP SERPL CALCULATED.3IONS-SCNC: 9 MMOL/L (ref 7–15)
AST SERPL W P-5'-P-CCNC: 21 U/L (ref 0–45)
BASOPHILS # BLD AUTO: 0.1 10E3/UL (ref 0–0.2)
BASOPHILS NFR BLD AUTO: 1 %
BILIRUB SERPL-MCNC: 0.4 MG/DL
BUN SERPL-MCNC: 12.9 MG/DL (ref 6–20)
CALCIUM SERPL-MCNC: 9.4 MG/DL (ref 8.8–10.4)
CHLORIDE SERPL-SCNC: 104 MMOL/L (ref 98–107)
CHOLEST SERPL-MCNC: 241 MG/DL
CREAT SERPL-MCNC: 0.93 MG/DL (ref 0.51–0.95)
EGFRCR SERPLBLD CKD-EPI 2021: 72 ML/MIN/1.73M2
EOSINOPHIL # BLD AUTO: 0.2 10E3/UL (ref 0–0.7)
EOSINOPHIL NFR BLD AUTO: 3 %
ERYTHROCYTE [DISTWIDTH] IN BLOOD BY AUTOMATED COUNT: 13.3 % (ref 10–15)
FASTING STATUS PATIENT QL REPORTED: NO
FASTING STATUS PATIENT QL REPORTED: NO
GLUCOSE SERPL-MCNC: 87 MG/DL (ref 70–99)
HCO3 SERPL-SCNC: 28 MMOL/L (ref 22–29)
HCT VFR BLD AUTO: 43.4 % (ref 35–47)
HDLC SERPL-MCNC: 67 MG/DL
HGB BLD-MCNC: 14.5 G/DL (ref 11.7–15.7)
HOLD SPECIMEN: NORMAL
IMM GRANULOCYTES # BLD: 0 10E3/UL
IMM GRANULOCYTES NFR BLD: 0 %
IRON BINDING CAPACITY (ROCHE): 300 UG/DL (ref 240–430)
IRON SATN MFR SERPL: 21 % (ref 15–46)
IRON SERPL-MCNC: 62 UG/DL (ref 37–145)
LDLC SERPL CALC-MCNC: 158 MG/DL
LYMPHOCYTES # BLD AUTO: 2.9 10E3/UL (ref 0.8–5.3)
LYMPHOCYTES NFR BLD AUTO: 38 %
MCH RBC QN AUTO: 31.7 PG (ref 26.5–33)
MCHC RBC AUTO-ENTMCNC: 33.4 G/DL (ref 31.5–36.5)
MCV RBC AUTO: 95 FL (ref 78–100)
MONOCYTES # BLD AUTO: 0.7 10E3/UL (ref 0–1.3)
MONOCYTES NFR BLD AUTO: 9 %
NEUTROPHILS # BLD AUTO: 3.8 10E3/UL (ref 1.6–8.3)
NEUTROPHILS NFR BLD AUTO: 49 %
NONHDLC SERPL-MCNC: 174 MG/DL
NRBC # BLD AUTO: 0 10E3/UL
NRBC BLD AUTO-RTO: 0 /100
PLATELET # BLD AUTO: 301 10E3/UL (ref 150–450)
POTASSIUM SERPL-SCNC: 4.2 MMOL/L (ref 3.4–5.3)
PROT SERPL-MCNC: 6.9 G/DL (ref 6.4–8.3)
RBC # BLD AUTO: 4.57 10E6/UL (ref 3.8–5.2)
SODIUM SERPL-SCNC: 141 MMOL/L (ref 135–145)
TRIGL SERPL-MCNC: 80 MG/DL
TSH SERPL DL<=0.005 MIU/L-ACNC: 2.13 UIU/ML (ref 0.3–4.2)
WBC # BLD AUTO: 7.7 10E3/UL (ref 4–11)

## 2024-10-30 PROCEDURE — 93000 ELECTROCARDIOGRAM COMPLETE: CPT | Performed by: INTERNAL MEDICINE

## 2024-10-30 PROCEDURE — 99213 OFFICE O/P EST LOW 20 MIN: CPT | Mod: 25 | Performed by: STUDENT IN AN ORGANIZED HEALTH CARE EDUCATION/TRAINING PROGRAM

## 2024-10-30 PROCEDURE — 99396 PREV VISIT EST AGE 40-64: CPT | Performed by: STUDENT IN AN ORGANIZED HEALTH CARE EDUCATION/TRAINING PROGRAM

## 2024-10-30 PROCEDURE — 83550 IRON BINDING TEST: CPT | Mod: ZL | Performed by: STUDENT IN AN ORGANIZED HEALTH CARE EDUCATION/TRAINING PROGRAM

## 2024-10-30 PROCEDURE — 80061 LIPID PANEL: CPT | Mod: ZL | Performed by: STUDENT IN AN ORGANIZED HEALTH CARE EDUCATION/TRAINING PROGRAM

## 2024-10-30 PROCEDURE — 85025 COMPLETE CBC W/AUTO DIFF WBC: CPT | Mod: ZL | Performed by: STUDENT IN AN ORGANIZED HEALTH CARE EDUCATION/TRAINING PROGRAM

## 2024-10-30 PROCEDURE — 80053 COMPREHEN METABOLIC PANEL: CPT | Mod: ZL | Performed by: STUDENT IN AN ORGANIZED HEALTH CARE EDUCATION/TRAINING PROGRAM

## 2024-10-30 PROCEDURE — 84443 ASSAY THYROID STIM HORMONE: CPT | Mod: ZL | Performed by: STUDENT IN AN ORGANIZED HEALTH CARE EDUCATION/TRAINING PROGRAM

## 2024-10-30 PROCEDURE — 36415 COLL VENOUS BLD VENIPUNCTURE: CPT | Mod: ZL | Performed by: STUDENT IN AN ORGANIZED HEALTH CARE EDUCATION/TRAINING PROGRAM

## 2024-10-30 RX ORDER — BUSPIRONE HYDROCHLORIDE 10 MG/1
1 TABLET ORAL
COMMUNITY
Start: 2024-08-08

## 2024-10-30 RX ORDER — ESCITALOPRAM OXALATE 5 MG/1
1 TABLET ORAL
COMMUNITY
Start: 2024-08-08

## 2024-10-30 RX ORDER — BUPROPION HYDROCHLORIDE 300 MG/1
1 TABLET ORAL
COMMUNITY
Start: 2024-10-11

## 2024-10-30 ASSESSMENT — ANXIETY QUESTIONNAIRES
IF YOU CHECKED OFF ANY PROBLEMS ON THIS QUESTIONNAIRE, HOW DIFFICULT HAVE THESE PROBLEMS MADE IT FOR YOU TO DO YOUR WORK, TAKE CARE OF THINGS AT HOME, OR GET ALONG WITH OTHER PEOPLE: SOMEWHAT DIFFICULT
1. FEELING NERVOUS, ANXIOUS, OR ON EDGE: SEVERAL DAYS
GAD7 TOTAL SCORE: 4
8. IF YOU CHECKED OFF ANY PROBLEMS, HOW DIFFICULT HAVE THESE MADE IT FOR YOU TO DO YOUR WORK, TAKE CARE OF THINGS AT HOME, OR GET ALONG WITH OTHER PEOPLE?: SOMEWHAT DIFFICULT
6. BECOMING EASILY ANNOYED OR IRRITABLE: SEVERAL DAYS
7. FEELING AFRAID AS IF SOMETHING AWFUL MIGHT HAPPEN: NOT AT ALL
GAD7 TOTAL SCORE: 4
GAD7 TOTAL SCORE: 4
7. FEELING AFRAID AS IF SOMETHING AWFUL MIGHT HAPPEN: NOT AT ALL
3. WORRYING TOO MUCH ABOUT DIFFERENT THINGS: SEVERAL DAYS
2. NOT BEING ABLE TO STOP OR CONTROL WORRYING: SEVERAL DAYS
4. TROUBLE RELAXING: NOT AT ALL
5. BEING SO RESTLESS THAT IT IS HARD TO SIT STILL: NOT AT ALL

## 2024-10-30 ASSESSMENT — PATIENT HEALTH QUESTIONNAIRE - PHQ9
SUM OF ALL RESPONSES TO PHQ QUESTIONS 1-9: 7
10. IF YOU CHECKED OFF ANY PROBLEMS, HOW DIFFICULT HAVE THESE PROBLEMS MADE IT FOR YOU TO DO YOUR WORK, TAKE CARE OF THINGS AT HOME, OR GET ALONG WITH OTHER PEOPLE: SOMEWHAT DIFFICULT
SUM OF ALL RESPONSES TO PHQ QUESTIONS 1-9: 7

## 2024-10-30 ASSESSMENT — PAIN SCALES - GENERAL: PAINLEVEL_OUTOF10: NO PAIN (0)

## 2024-10-30 NOTE — PATIENT INSTRUCTIONS
Patient Education   Preventive Care Advice   This is general advice given by our system to help you stay healthy. However, your care team may have specific advice just for you. Please talk to your care team about your preventive care needs.  Nutrition  Eat 5 or more servings of fruits and vegetables each day.  Try wheat bread, brown rice and whole grain pasta (instead of white bread, rice, and pasta).  Get enough calcium and vitamin D. Check the label on foods and aim for 100% of the RDA (recommended daily allowance).  Lifestyle  Exercise at least 150 minutes each week  (30 minutes a day, 5 days a week).  Do muscle strengthening activities 2 days a week. These help control your weight and prevent disease.  No smoking.  Wear sunscreen to prevent skin cancer.  Have a dental exam and cleaning every 6 months.  Yearly exams  See your health care team every year to talk about:  Any changes in your health.  Any medicines your care team has prescribed.  Preventive care, family planning, and ways to prevent chronic diseases.  Shots (vaccines)   HPV shots (up to age 26), if you've never had them before.  Hepatitis B shots (up to age 59), if you've never had them before.  COVID-19 shot: Get this shot when it's due.  Flu shot: Get a flu shot every year.  Tetanus shot: Get a tetanus shot every 10 years.  Pneumococcal, hepatitis A, and RSV shots: Ask your care team if you need these based on your risk.  Shingles shot (for age 50 and up)  General health tests  Diabetes screening:  Starting at age 35, Get screened for diabetes at least every 3 years.  If you are younger than age 35, ask your care team if you should be screened for diabetes.  Cholesterol test: At age 39, start having a cholesterol test every 5 years, or more often if advised.  Bone density scan (DEXA): At age 50, ask your care team if you should have this scan for osteoporosis (brittle bones).  Hepatitis C: Get tested at least once in your life.  STIs (sexually  transmitted infections)  Before age 24: Ask your care team if you should be screened for STIs.  After age 24: Get screened for STIs if you're at risk. You are at risk for STIs (including HIV) if:  You are sexually active with more than one person.  You don't use condoms every time.  You or a partner was diagnosed with a sexually transmitted infection.  If you are at risk for HIV, ask about PrEP medicine to prevent HIV.  Get tested for HIV at least once in your life, whether you are at risk for HIV or not.  Cancer screening tests  Cervical cancer screening: If you have a cervix, begin getting regular cervical cancer screening tests starting at age 21.  Breast cancer scan (mammogram): If you've ever had breasts, begin having regular mammograms starting at age 40. This is a scan to check for breast cancer.  Colon cancer screening: It is important to start screening for colon cancer at age 45.  Have a colonoscopy test every 10 years (or more often if you're at risk) Or, ask your provider about stool tests like a FIT test every year or Cologuard test every 3 years.  To learn more about your testing options, visit:   .  For help making a decision, visit:   https://bit.ly/py75452.  Prostate cancer screening test: If you have a prostate, ask your care team if a prostate cancer screening test (PSA) at age 55 is right for you.  Lung cancer screening: If you are a current or former smoker ages 50 to 80, ask your care team if ongoing lung cancer screenings are right for you.  For informational purposes only. Not to replace the advice of your health care provider. Copyright   2023 [facility]. All rights reserved. Clinically reviewed by the [facility]. Assurely 914048 - REV 01/24.  Preventing Falls: Care Instructions  Injuries and health problems such as trouble walking or poor eyesight can increase your risk of falling. So can some medicines. But there are things you can do to help  "prevent falls. You can exercise to get stronger. You can also arrange your home to make it safer.    Talk to your doctor about the medicines you take. Ask if any of them increase the risk of falls and whether they can be changed or stopped.   Try to exercise regularly. It can help improve your strength and balance. This can help lower your risk of falling.         Practice fall safety and prevention.   Wear low-heeled shoes that fit well and give your feet good support. Talk to your doctor if you have foot problems that make this hard.  Carry a cellphone or wear a medical alert device that you can use to call for help.  Use stepladders instead of chairs to reach high objects. Don't climb if you're at risk for falls. Ask for help, if needed.  Wear the correct eyeglasses, if you need them.        Make your home safer.   Remove rugs, cords, clutter, and furniture from walkways.  Keep your house well lit. Use night-lights in hallways and bathrooms.  Install and use sturdy handrails on stairways.  Wear nonskid footwear, even inside. Don't walk barefoot or in socks without shoes.        Be safe outside.   Use handrails, curb cuts, and ramps whenever possible.  Keep your hands free by using a shoulder bag or backpack.  Try to walk in well-lit areas. Watch out for uneven ground, changes in pavement, and debris.  Be careful in the winter. Walk on the grass or gravel when sidewalks are slippery. Use de-icer on steps and walkways. Add non-slip devices to shoes.    Put grab bars and nonskid mats in your shower or tub and near the toilet. Try to use a shower chair or bath bench when bathing.   Get into a tub or shower by putting in your weaker leg first. Get out with your strong side first. Have a phone or medical alert device in the bathroom with you.   Where can you learn more?  Go to https://www.Ethonovawise.net/patiented  Enter G117 in the search box to learn more about \"Preventing Falls: Care Instructions.\"  Current as of: " July 17, 2023  Content Version: 14.2 2024 [employer].   Care instructions adapted under license by your healthcare professional. If you have questions about a medical condition or this instruction, always ask your healthcare professional. Health[employer] disclaims any warranty or liability for your use of this information.    Learning About Stress  What is stress?     Stress is your body's response to a hard situation. Your body can have a physical, emotional, or mental response. Stress is a fact of life for most people, and it affects everyone differently. What causes stress for you may not be stressful for someone else.  A lot of things can cause stress. You may feel stress when you go on a job interview, take a test, or run a race. This kind of short-term stress is normal and even useful. It can help you if you need to work hard or react quickly. For example, stress can help you finish an important job on time.  Long-term stress is caused by ongoing stressful situations or events. Examples of long-term stress include long-term health problems, ongoing problems at work, or conflicts in your family. Long-term stress can harm your health.  How does stress affect your health?  When you are stressed, your body responds as though you are in danger. It makes hormones that speed up your heart, make you breathe faster, and give you a burst of energy. This is called the fight-or-flight stress response. If the stress is over quickly, your body goes back to normal and no harm is done.  But if stress happens too often or lasts too long, it can have bad effects. Long-term stress can make you more likely to get sick, and it can make symptoms of some diseases worse. If you tense up when you are stressed, you may develop neck, shoulder, or low back pain. Stress is linked to high blood pressure and heart disease.  Stress also harms your emotional health. It can make you russo, tense, or depressed. Your  relationships may suffer, and you may not do well at work or school.  What can you do to manage stress?  You can try these things to help manage stress:   Do something active. Exercise or activity can help reduce stress. Walking is a great way to get started. Even everyday activities such as housecleaning or yard work can help.  Try yoga or jo chi. These techniques combine exercise and meditation. You may need some training at first to learn them.  Do something you enjoy. For example, listen to music or go to a movie. Practice your hobby or do volunteer work.  Meditate. This can help you relax, because you are not worrying about what happened before or what may happen in the future.  Do guided imagery. Imagine yourself in any setting that helps you feel calm. You can use online videos, books, or a teacher to guide you.  Do breathing exercises. For example:  From a standing position, bend forward from the waist with your knees slightly bent. Let your arms dangle close to the floor.  Breathe in slowly and deeply as you return to a standing position. Roll up slowly and lift your head last.  Hold your breath for just a few seconds in the standing position.  Breathe out slowly and bend forward from the waist.  Let your feelings out. Talk, laugh, cry, and express anger when you need to. Talking with supportive friends or family, a counselor, or a indira leader about your feelings is a healthy way to relieve stress. Avoid discussing your feelings with people who make you feel worse.  Write. It may help to write about things that are bothering you. This helps you find out how much stress you feel and what is causing it. When you know this, you can find better ways to cope.  What can you do to prevent stress?  You might try some of these things to help prevent stress:  Manage your time. This helps you find time to do the things you want and need to do.  Get enough sleep. Your body recovers from the stresses of the day while  "you are sleeping.  Get support. Your family, friends, and community can make a difference in how you experience stress.  Limit your news feed. Avoid or limit time on social media or news that may make you feel stressed.  Do something active. Exercise or activity can help reduce stress. Walking is a great way to get started.  Where can you learn more?  Go to https://www.Accertify.net/patiented  Enter N032 in the search box to learn more about \"Learning About Stress.\"  Current as of: October 24, 2023  Content Version: 14.2 2024 [employer].   Care instructions adapted under license by your healthcare professional. If you have questions about a medical condition or this instruction, always ask your healthcare professional. Healthwise, [employer] disclaims any warranty or liability for your use of this information.    Learning About Depression Screening  What is depression screening?  Depression screening is a way to see if you have depression symptoms. It may be done by a doctor or counselor. It's often part of a routine checkup. That's because your mental health is just as important as your physical health.  Depression is a mental health condition that affects how you feel, think, and act. You may:  Have less energy.  Lose interest in your daily activities.  Feel sad and grouchy for a long time.  Depression is very common. It affects people of all ages.  Many things can lead to depression. Some people become depressed after they have a stroke or find out they have a major illness like cancer or heart disease. The death of a loved one or a breakup may lead to depression. It can run in families. Most experts believe that a combination of inherited genes and stressful life events can cause it.  What happens during screening?  You may be asked to fill out a form about your depression symptoms. You and the doctor will discuss your answers. The doctor may ask you more questions to learn more about how you " "think, act, and feel.  What happens after screening?  If you have symptoms of depression, your doctor will talk to you about your options.  Doctors usually treat depression with medicines or counseling. Often, combining the two works best. Many people don't get help because they think that they'll get over the depression on their own. But people with depression may not get better unless they get treatment.  The cause of depression is not well understood. There may be many factors involved. But if you have depression, it's not your fault.  A serious symptom of depression is thinking about death or suicide. If you or someone you care about talks about this or about feeling hopeless, get help right away.  It's important to know that depression can be treated. Medicine, counseling, and self-care may help.  Where can you learn more?  Go to https://www.Anteryon.net/patiented  Enter T185 in the search box to learn more about \"Learning About Depression Screening.\"  Current as of: June 24, 2023  Content Version: 14.2 2024 [facility] [employer].   Care instructions adapted under license by your healthcare professional. If you have questions about a medical condition or this instruction, always ask your healthcare professional. Healthwise, Incorporated disclaims any warranty or liability for your use of this information.    9 Ways to Cut Back on Drinking  Maybe you've found yourself drinking more alcohol than you'd prefer. If you want to cut back, here are some ideas to try.    Think before you drink.  Do you really want a drink, or is it just a habit? If you're used to having a drink at a certain time, try doing something else then.     Look for substitutes.  Find some no-alcohol drinks that you enjoy, like flavored seltzer water, tea with honey, or tonic with a slice of lime. Or try alcohol-free beer or \"virgin\" cocktails (without the alcohol).     Drink more water.  Use water to quench your thirst. Drink a glass of " "water before you have any alcohol. Have another glass along with every drink or between drinks.     Shrink your drink.  For example, have a bottle of beer instead of a pint. Use a smaller glass for wine. Choose drinks with lower alcohol content (ABV%). Or use less liquor and more mixer in cocktails.     Slow down.  It's easy to drink quickly and without thinking about it. Pay attention, and make each drink last longer.     Do the math.  Total up how much you spend on alcohol each month. How much is that a year? If you cut back, what could you do with the money you save?     Take a break.  Choose a day or two each week when you won't drink at all. Notice how you feel on those days, physically and emotionally. How did you sleep? Do you feel better? Over time, add more break days.     Count calories.  Would you like to lose some weight? For some people that's a good motivator for cutting back. Figure out how many calories are in each drink. How many does that add up to in a day? In a week? In a month?     Practice saying no.  Be ready when someone offers you a drink. Try: \"Thanks, I've had enough.\" Or \"Thanks, but I'm cutting back.\" Or \"No, thanks. I feel better when I drink less.\"   Current as of: November 15, 2023  Content Version: 14.2 2024 [facility] [employer].   Care instructions adapted under license by your healthcare professional. If you have questions about a medical condition or this instruction, always ask your healthcare professional. Healthwise, Incorporated disclaims any warranty or liability for your use of this information.     "

## 2024-10-30 NOTE — PROGRESS NOTES
Preventive Care Visit  [facility]  Cornelius Sands MD, Family Medicine  Oct 30, 2024      Assessment & Plan     Routine general medical examination at a health care facility  - CBC and Differential; Future  - Comprehensive Metabolic Panel; Future  - TSH Reflex GH; Future  - Iron & Iron Binding Capacity; Future  - Lipid Panel; Future  - CBC and Differential  - Comprehensive Metabolic Panel  - TSH Reflex GH  - Iron & Iron Binding Capacity  - Lipid Panel    Lightheadedness  EKG with short DC interval, similar to previous EKG. Will check thyroid, labs. Could be due to new anxiety meds vs anxiety.   - EKG 12-lead, tracing only    History of DVT (deep vein thrombosis)  Chronic, refilled. Chronic swelling in left ankle/leg   - apixaban ANTICOAGULANT (ELIQUIS) 5 MG tablet; Take 1 tablet (5mg) by mouth 2 times daily            Counseling  Appropriate preventive services were addressed with this patient via screening, questionnaire, or discussion as appropriate for fall prevention, nutrition, physical activity, Tobacco-use cessation, social engagement, weight loss and cognition.  Checklist reviewing preventive services available has been given to the patient.  Reviewed patient's diet, addressing concerns and/or questions.   The patient reports drinking more than 3 alcoholic drinks per day and/or more than 7 drhnks per week. The patient was counseled and given information about possible harmful effects of excessive alcohol intake.The patient's PHQ-9 score is consistent with mild depression. She was provided with information regarding depression.           Return in about 53 weeks (around 11/5/2025) for Annual Wellness Visit.    Kiel Herrera is a 54 year old, presenting for the following:  Physical (Annual exam)        5/20/2024     1:34 PM   Additional Questions   Roomed by don, lpn         HPI  Light headed, nauseated, shaky.   - started 3-4 weeks ago   - noticed in the car, car sick  sensation.   - itchy palms and feet  - sweating   - anxiety-- counseling and med management not at [facility]   - no palpitations  - no trigger to these symptoms, not positional             Health Care Directive  Patient does not have a Health Care Directive: Discussed advance care planning with patient; information given to patient to review.      10/26/2024   General Health   How would you rate your overall physical health? (!) FAIR   Feel stress (tense, anxious, or unable to sleep) Rather much      (!) STRESS CONCERN      10/26/2024   Nutrition   Three or more servings of calcium each day? (!) NO   Diet: Regular (no restrictions)   How many servings of fruit and vegetables per day? (!) 2-3   How many sweetened beverages each day? 0-1            10/26/2024   Exercise   Days per week of moderate/strenous exercise 0 days   Average minutes spent exercising at this level 0 min      (!) EXERCISE CONCERN      10/26/2024   Social Factors   Frequency of gathering with friends or relatives More than three times a week   Worry food won't last until get money to buy more No   Food not last or not have enough money for food? No   Do you have housing? (Housing is defined as stable permanent housing and does not include staying ouside in a car, in a tent, in an abandoned building, in an overnight shelter, or couch-surfing.) Yes   Are you worried about losing your housing? No   Lack of transportation? No   Unable to get utilities (heat,electricity)? No            10/26/2024   Fall Risk   Fallen 2 or more times in the past year? Yes    Trouble with walking or balance? No        Patient-reported           10/26/2024   Dental   Dentist two times every year? Yes            10/26/2024   TB Screening   Were you born outside of the [place]? No          Today's PHQ-9 Score:       10/30/2024     2:57 PM   PHQ-9 SCORE   PHQ-9 Total Score MyChart 7 (Mild depression)   PHQ-9 Total Score 7        Patient-reported         10/26/2024   Substance Use    Alcohol more than 3/day or more than 7/wk Yes   How often do you have a drink containing alcohol 4 or more times a week   How many alcohol drinks on typical day 1 or 2   How often do you have 5+ drinks at one occasion Less than monthly   Audit 2/3 Score 1   How often not able to stop drinking once started Never   How often failed to do what normally expected Never   How often needed first drink in am after a heavy drinking session Never   How often feeling of guilt or remorse after drinking Less than monthly   How often unable to remember what happened the night before Never   Have you or someone else been injured because of your drinking No   Has anyone been concerned or suggested you cut down on drinking No   TOTAL SCORE - AUDIT 6   Do you use any other substances recreationally? No        Social History     Tobacco Use    Smoking status: Former     Current packs/day: 0.00     Average packs/day: 1 pack/day for 10.0 years (10.0 ttl pk-yrs)     Types: Cigarettes     Start date: 1996     Quit date: 2006     Years since quittin.8     Passive exposure: Past    Smokeless tobacco: Never    Tobacco comments:     Passive exposure in childhood and adult homes.    Vaping Use    Vaping status: Never Used   Substance Use Topics    Alcohol use: Yes     Alcohol/week: 2.5 standard drinks of alcohol     Types: 3 Standard drinks or equivalent per week     Comment: 2 per day    Drug use: No           2024   LAST FHS-7 RESULTS   1st degree relative breast or ovarian cancer No   Any relative bilateral breast cancer No   Any male have breast cancer No   Any ONE woman have BOTH breast AND ovarian cancer No   Any woman with breast cancer before 50yrs No   2 or more relatives with breast AND/OR ovarian cancer No   2 or more relatives with breast AND/OR bowel cancer No           Mammogram Screening - Mammogram every 1-2 years updated in Health Maintenance based on mutual decision making        10/26/2024   STI Screening  "  New sexual partner(s) since last STI/HIV test? No        History of abnormal Pap smear: No - age 30- 64 PAP with HPV every 5 years recommended        Latest Ref Rng & Units 5/11/2023     7:50 AM 3/5/2018     8:42 AM   PAP / HPV   PAP  Negative for Intraepithelial Lesion or Malignancy (NILM)     HPV 16 DNA Negative Negative  Negative    HPV 18 DNA Negative Negative  Negative    Other HR HPV Negative Negative  Negative      ASCVD Risk   The 10-year ASCVD risk score (Mychal SCHAFER, et al., 2019) is: 1.6%    Values used to calculate the score:      Age: 54 years      Sex: Female      Is Non- : No      Diabetic: No      Tobacco smoker: No      Systolic Blood Pressure: 120 mmHg      Is BP treated: No      HDL Cholesterol: 70 mg/dL      Total Cholesterol: 206 mg/dL           Reviewed and updated as needed this visit by Provider                          Review of Systems  Constitutional, HEENT, cardiovascular, pulmonary, gi and gu systems are negative, except as otherwise noted.     Objective    Exam  /76 (BP Location: Right arm, Patient Position: Sitting, Cuff Size: Adult Large)   Pulse 106   Temp 97.9  F (36.6  C) (Tympanic)   Resp 18   Ht 1.638 m (5' 4.5\")   Wt 93.2 kg (205 lb 8 oz)   LMP 04/01/2014 (Approximate)   SpO2 99%   BMI 34.73 kg/m     Estimated body mass index is 34.73 kg/m  as calculated from the following:    Height as of this encounter: 1.638 m (5' 4.5\").    Weight as of this encounter: 93.2 kg (205 lb 8 oz).    Physical Exam  GENERAL: alert and no distress  EYES: Eyes grossly normal to inspection, PERRL and conjunctivae and sclerae normal  HENT: ear canals and TM's normal, nose and mouth without ulcers or lesions  NECK: no adenopathy, no asymmetry, masses, or scars  RESP: lungs clear to auscultation - no rales, rhonchi or wheezes  CV: regular rate and rhythm, normal S1 S2, no S3 or S4, no murmur, click or rub, no peripheral edema   ABDOMEN: soft, nontender, no " hepatosplenomegaly, no masses and bowel sounds normal  MS: left ankle edema   PSYCH: mentation appears normal, affect normal/bright        Signed Electronically by: Cornelius Sands MD  Answers submitted by the patient for this visit:  Patient Health Questionnaire (Submitted on 10/30/2024)  If you checked off any problems, how difficult have these problems made it for you to do your work, take care of things at home, or get along with other people?: Somewhat difficult  PHQ9 TOTAL SCORE: 7  Patient Health Questionnaire (G7) (Submitted on 10/30/2024)  ANDREA 7 TOTAL SCORE: 4

## 2024-10-30 NOTE — NURSING NOTE
"Chief Complaint   Patient presents with    Physical     Annual exam       Initial /76 (BP Location: Right arm, Patient Position: Sitting, Cuff Size: Adult Large)   Pulse 106   Temp 97.9  F (36.6  C) (Tympanic)   Resp 18   Ht 1.638 m (5' 4.5\")   Wt 93.2 kg (205 lb 8 oz)   LMP 04/01/2014 (Approximate)   SpO2 99%   BMI 34.73 kg/m   Estimated body mass index is 34.73 kg/m  as calculated from the following:    Height as of this encounter: 1.638 m (5' 4.5\").    Weight as of this encounter: 93.2 kg (205 lb 8 oz).  Medication Review: complete    The next two questions are to help us understand your food security.  If you are feeling you need any assistance in this area, we have resources available to support you today.          10/26/2024   SDOH- Food Insecurity   Within the past 12 months, did you worry that your food would run out before you got money to buy more? N    Within the past 12 months, did the food you bought just not last and you didn t have money to get more? N        Patient-reported         Health Care Directive:  Patient does not have a Health Care Directive: Discussed advance care planning with patient; however, patient declined at this time.    Jayshree Hagan LPN      "

## 2024-10-31 LAB
ATRIAL RATE - MUSE: 87 BPM
DIASTOLIC BLOOD PRESSURE - MUSE: NORMAL MMHG
INTERPRETATION ECG - MUSE: NORMAL
P AXIS - MUSE: 31 DEGREES
PR INTERVAL - MUSE: 106 MS
QRS DURATION - MUSE: 86 MS
QT - MUSE: 336 MS
QTC - MUSE: 404 MS
R AXIS - MUSE: 0 DEGREES
SYSTOLIC BLOOD PRESSURE - MUSE: NORMAL MMHG
T AXIS - MUSE: 15 DEGREES
VENTRICULAR RATE- MUSE: 87 BPM

## 2025-02-05 ENCOUNTER — OFFICE VISIT (OUTPATIENT)
Dept: INTERNAL MEDICINE | Facility: OTHER | Age: 55
End: 2025-02-05
Payer: COMMERCIAL

## 2025-02-05 ENCOUNTER — TELEPHONE (OUTPATIENT)
Dept: SURGERY | Facility: OTHER | Age: 55
End: 2025-02-05

## 2025-02-05 VITALS
WEIGHT: 211.6 LBS | RESPIRATION RATE: 18 BRPM | SYSTOLIC BLOOD PRESSURE: 132 MMHG | OXYGEN SATURATION: 96 % | DIASTOLIC BLOOD PRESSURE: 83 MMHG | BODY MASS INDEX: 35.25 KG/M2 | HEART RATE: 91 BPM | HEIGHT: 65 IN | TEMPERATURE: 96.9 F

## 2025-02-05 DIAGNOSIS — D68.51 HETEROZYGOUS FACTOR V LEIDEN MUTATION: ICD-10-CM

## 2025-02-05 DIAGNOSIS — E66.812 CLASS 2 OBESITY WITH BODY MASS INDEX (BMI) OF 35.0 TO 35.9 IN ADULT, UNSPECIFIED OBESITY TYPE, UNSPECIFIED WHETHER SERIOUS COMORBIDITY PRESENT: ICD-10-CM

## 2025-02-05 DIAGNOSIS — K21.9 GASTROESOPHAGEAL REFLUX DISEASE, UNSPECIFIED WHETHER ESOPHAGITIS PRESENT: Primary | ICD-10-CM

## 2025-02-05 LAB
BASOPHILS # BLD AUTO: 0.1 10E3/UL (ref 0–0.2)
BASOPHILS NFR BLD AUTO: 2 %
EOSINOPHIL # BLD AUTO: 0.2 10E3/UL (ref 0–0.7)
EOSINOPHIL NFR BLD AUTO: 3 %
ERYTHROCYTE [DISTWIDTH] IN BLOOD BY AUTOMATED COUNT: 12.4 % (ref 10–15)
HCT VFR BLD AUTO: 43.9 % (ref 35–47)
HGB BLD-MCNC: 14.8 G/DL (ref 11.7–15.7)
IMM GRANULOCYTES # BLD: 0 10E3/UL
IMM GRANULOCYTES NFR BLD: 0 %
LYMPHOCYTES # BLD AUTO: 1.9 10E3/UL (ref 0.8–5.3)
LYMPHOCYTES NFR BLD AUTO: 32 %
MCH RBC QN AUTO: 31.1 PG (ref 26.5–33)
MCHC RBC AUTO-ENTMCNC: 33.7 G/DL (ref 31.5–36.5)
MCV RBC AUTO: 92 FL (ref 78–100)
MONOCYTES # BLD AUTO: 0.5 10E3/UL (ref 0–1.3)
MONOCYTES NFR BLD AUTO: 8 %
NEUTROPHILS # BLD AUTO: 3.3 10E3/UL (ref 1.6–8.3)
NEUTROPHILS NFR BLD AUTO: 56 %
NRBC # BLD AUTO: 0 10E3/UL
NRBC BLD AUTO-RTO: 0 /100
PLATELET # BLD AUTO: 291 10E3/UL (ref 150–450)
RBC # BLD AUTO: 4.76 10E6/UL (ref 3.8–5.2)
WBC # BLD AUTO: 5.9 10E3/UL (ref 4–11)

## 2025-02-05 PROCEDURE — 85004 AUTOMATED DIFF WBC COUNT: CPT | Mod: ZL

## 2025-02-05 PROCEDURE — 36415 COLL VENOUS BLD VENIPUNCTURE: CPT | Mod: ZL

## 2025-02-05 PROCEDURE — 85049 AUTOMATED PLATELET COUNT: CPT | Mod: ZL

## 2025-02-05 ASSESSMENT — ENCOUNTER SYMPTOMS
NAUSEA: 0
CONSTIPATION: 0
DIARRHEA: 0
ANAL BLEEDING: 0
COUGH: 0
HEMATOCHEZIA: 0
ABDOMINAL PAIN: 0
ABDOMINAL DISTENTION: 0
SHORTNESS OF BREATH: 0
VOMITING: 0
PALPITATIONS: 0
HEARTBURN: 1

## 2025-02-05 ASSESSMENT — ANXIETY QUESTIONNAIRES
7. FEELING AFRAID AS IF SOMETHING AWFUL MIGHT HAPPEN: NOT AT ALL
4. TROUBLE RELAXING: NOT AT ALL
GAD7 TOTAL SCORE: 3
6. BECOMING EASILY ANNOYED OR IRRITABLE: SEVERAL DAYS
7. FEELING AFRAID AS IF SOMETHING AWFUL MIGHT HAPPEN: NOT AT ALL
1. FEELING NERVOUS, ANXIOUS, OR ON EDGE: SEVERAL DAYS
8. IF YOU CHECKED OFF ANY PROBLEMS, HOW DIFFICULT HAVE THESE MADE IT FOR YOU TO DO YOUR WORK, TAKE CARE OF THINGS AT HOME, OR GET ALONG WITH OTHER PEOPLE?: SOMEWHAT DIFFICULT
5. BEING SO RESTLESS THAT IT IS HARD TO SIT STILL: NOT AT ALL
2. NOT BEING ABLE TO STOP OR CONTROL WORRYING: NOT AT ALL
3. WORRYING TOO MUCH ABOUT DIFFERENT THINGS: SEVERAL DAYS
GAD7 TOTAL SCORE: 3
IF YOU CHECKED OFF ANY PROBLEMS ON THIS QUESTIONNAIRE, HOW DIFFICULT HAVE THESE PROBLEMS MADE IT FOR YOU TO DO YOUR WORK, TAKE CARE OF THINGS AT HOME, OR GET ALONG WITH OTHER PEOPLE: SOMEWHAT DIFFICULT
GAD7 TOTAL SCORE: 3

## 2025-02-05 ASSESSMENT — PATIENT HEALTH QUESTIONNAIRE - PHQ9
SUM OF ALL RESPONSES TO PHQ QUESTIONS 1-9: 4
SUM OF ALL RESPONSES TO PHQ QUESTIONS 1-9: 4
10. IF YOU CHECKED OFF ANY PROBLEMS, HOW DIFFICULT HAVE THESE PROBLEMS MADE IT FOR YOU TO DO YOUR WORK, TAKE CARE OF THINGS AT HOME, OR GET ALONG WITH OTHER PEOPLE: NOT DIFFICULT AT ALL

## 2025-02-05 ASSESSMENT — PAIN SCALES - GENERAL: PAINLEVEL_OUTOF10: NO PAIN (0)

## 2025-02-05 NOTE — TELEPHONE ENCOUNTER
Ok to schedule next routine available diagnostic EGD. Will need to stop el\aquis 2 days before EGDThanks! Lynsey Lewis MD on 2/5/2025 at 4:39 PM

## 2025-02-05 NOTE — H&P (VIEW-ONLY)
"Assessment & Plan     ICD-10-CM    1. Gastroesophageal reflux disease, unspecified whether esophagitis present  K21.9 Adult GI  Referral - Procedure Only     CBC and Differential     CBC and Differential      2. Heterozygous factor V Leiden mutation  D68.51       3. Class 2 obesity with body mass index (BMI) of 35.0 to 35.9 in adult, unspecified obesity type, unspecified whether serious comorbidity present  E66.812     Z68.35            GERD: Discussed symptom management of this.  Due to recent initiation of Eliquis, recommend evaluation with upper endoscopy.  She will continue on PPI until endoscopy results are available.  We discussed avoiding long-term PPI use as a form of management for chronic acid reflux control.  If her endoscopy comes back benign, we will transition to Pepcid for reflux control. CBC does not show any evidence of anemia indicating a bleeding gastric ulcer.  Discussed importance of weight loss and decreasing alcohol intake.  She acknowledged recognition of this and will work towards cutting back on alcohol and making lifestyle modifications to assist in weight loss.  We did discuss the option of starting a GLP-1 agonist to assist her in weight loss.  We will await upper endoscopy results prior to initiating treatment.    The longitudinal plan of care for the diagnosis(es)/condition(s) as documented were addressed during this visit. Due to the added complexity in care, I will continue to support Sharon in the subsequent management and with ongoing continuity of care.                 BMI  Estimated body mass index is 35.76 kg/m  as calculated from the following:    Height as of this encounter: 1.638 m (5' 4.5\").    Weight as of this encounter: 96 kg (211 lb 9.6 oz).           No follow-ups on file.      ANNIKA Muñiz [facility] AND [facility]    Review of Systems   Respiratory:  Negative for cough and shortness of breath.    Cardiovascular:  Negative for chest pain and " palpitations.   Gastrointestinal:  Positive for heartburn. Negative for abdominal distention, abdominal pain, anal bleeding, constipation, diarrhea, hematochezia, nausea and vomiting.         Subjective   Sharon is a 54 year old, presenting for the following health issues:  Stomach issues (Acid Reflex, GERD symptoms )    Patient presents to clinic with a concern of acid reflux that developed 6 months ago.  This developed shortly after starting Eliquis.  She was started on Eliquis after DVT and PE, follows with hematology and was found to be heterozygous for factor V Leiden.  She states that she has been taking omeprazole over the last couple months which has been helpful in her symptoms.  She states her symptoms are worse at night when laying down, will feel a rush of gastric contents reflux into her mouth.  She reports drinking 1 alcoholic drink at night, this has not changed recently. Does not smoke.  She denies having history of reflux in the past.  Denies any epigastric pain, blood in stool, changes in bowel habits, nausea or vomiting.  Denies any chest pain or shortness of breath. She has had some progressive weight gain as well and has been working on managing her mental health which she has noticed improvement from.       Wt Readings from Last 5 Encounters:   02/05/25 96 kg (211 lb 9.6 oz)   10/30/24 93.2 kg (205 lb 8 oz)   06/13/24 93.2 kg (205 lb 6.4 oz)   05/20/24 93.3 kg (205 lb 9.6 oz)   04/05/24 90.7 kg (200 lb)       History of Present Illness       Reason for visit:  Acid reflux issues  Symptom onset:  More than a month  Symptoms include:  Wake up oughing and choking on the acid feeling of food in my throat  Symptom intensity:  Severe  Symptom progression:  Worsening  Had these symptoms before:  Yes  Has tried/received treatment for these symptoms:  No  What makes it worse:  Laying down  What makes it better:  I have been sleeping in the recliner   She is taking medications regularly.                    "  Objective    /83 (BP Location: Right arm, Patient Position: Sitting, Cuff Size: Adult Regular)   Pulse 91   Temp 96.9  F (36.1  C) (Tympanic)   Resp 18   Ht 1.638 m (5' 4.5\")   Wt 96 kg (211 lb 9.6 oz)   LMP 04/01/2014 (Approximate)   SpO2 96%   BMI 35.76 kg/m    Body mass index is 35.76 kg/m .  Physical Exam  Vitals reviewed.   Constitutional:       General: She is not in acute distress.     Appearance: She is well-developed. She is obese.   HENT:      Head: Normocephalic and atraumatic.      Nose: Nose normal.      Mouth/Throat:      Pharynx: No oropharyngeal exudate.   Eyes:      General: No scleral icterus.     Conjunctiva/sclera: Conjunctivae normal.      Pupils: Pupils are equal, round, and reactive to light.   Neck:      Thyroid: No thyromegaly.   Cardiovascular:      Rate and Rhythm: Normal rate and regular rhythm.      Heart sounds: No murmur heard.  Pulmonary:      Effort: Pulmonary effort is normal. No respiratory distress.      Breath sounds: Normal breath sounds. No wheezing.   Musculoskeletal:         General: No tenderness or deformity. Normal range of motion.      Cervical back: Normal range of motion and neck supple.   Skin:     General: Skin is warm and dry.      Findings: No rash.   Neurological:      Mental Status: She is alert and oriented to person, place, and time.      Cranial Nerves: No cranial nerve deficit.      Coordination: Coordination normal.   Psychiatric:         Behavior: Behavior normal.         Thought Content: Thought content normal.         Judgment: Judgment normal.        Results for orders placed or performed in visit on 02/05/25   CBC with platelets and differential     Status: None   Result Value Ref Range    WBC Count 5.9 4.0 - 11.0 10e3/uL    RBC Count 4.76 3.80 - 5.20 10e6/uL    Hemoglobin 14.8 11.7 - 15.7 g/dL    Hematocrit 43.9 35.0 - 47.0 %    MCV 92 78 - 100 fL    MCH 31.1 26.5 - 33.0 pg    MCHC 33.7 31.5 - 36.5 g/dL    RDW 12.4 10.0 - 15.0 %    " Platelet Count 291 150 - 450 10e3/uL    % Neutrophils 56 %    % Lymphocytes 32 %    % Monocytes 8 %    % Eosinophils 3 %    % Basophils 2 %    % Immature Granulocytes 0 %    NRBCs per 100 WBC 0 <1 /100    Absolute Neutrophils 3.3 1.6 - 8.3 10e3/uL    Absolute Lymphocytes 1.9 0.8 - 5.3 10e3/uL    Absolute Monocytes 0.5 0.0 - 1.3 10e3/uL    Absolute Eosinophils 0.2 0.0 - 0.7 10e3/uL    Absolute Basophils 0.1 0.0 - 0.2 10e3/uL    Absolute Immature Granulocytes 0.0 <=0.4 10e3/uL    Absolute NRBCs 0.0 10e3/uL   CBC and Differential     Status: None    Narrative    The following orders were created for panel order CBC and Differential.  Procedure                               Abnormality         Status                     ---------                               -----------         ------                     CBC with platelets and d...[[id number]]                      Final result                 Please view results for these tests on the individual orders.                   Signed Electronically by: ANNIKA Muñiz CNP

## 2025-02-05 NOTE — NURSING NOTE
"Chief Complaint   Patient presents with    Stomach issues     Acid Reflex, GERD symptoms        Initial /83 (BP Location: Right arm, Patient Position: Sitting, Cuff Size: Adult Regular)   Pulse 91   Temp 96.9  F (36.1  C) (Tympanic)   Resp 18   Ht 1.638 m (5' 4.5\")   Wt 96 kg (211 lb 9.6 oz)   LMP 04/01/2014 (Approximate)   SpO2 96%   BMI 35.76 kg/m   Estimated body mass index is 35.76 kg/m  as calculated from the following:    Height as of this encounter: 1.638 m (5' 4.5\").    Weight as of this encounter: 96 kg (211 lb 9.6 oz).  Medication Review: complete    The next two questions are to help us understand your food security.  If you are feeling you need any assistance in this area, we have resources available to support you today.          10/26/2024   SDOH- Food Insecurity   Within the past 12 months, did you worry that your food would run out before you got money to buy more? N   Within the past 12 months, did the food you bought just not last and you didn t have money to get more? N         Health Care Directive:  Patient does not have a Health Care Directive: Discussed advance care planning with patient; however, patient declined at this time.    Herbert Goldberg      "

## 2025-02-05 NOTE — PROGRESS NOTES
"Assessment & Plan     ICD-10-CM    1. Gastroesophageal reflux disease, unspecified whether esophagitis present  K21.9 Adult GI  Referral - Procedure Only     CBC and Differential     CBC and Differential      2. Heterozygous factor V Leiden mutation  D68.51       3. Class 2 obesity with body mass index (BMI) of 35.0 to 35.9 in adult, unspecified obesity type, unspecified whether serious comorbidity present  E66.812     Z68.35            GERD: Discussed symptom management of this.  Due to recent initiation of Eliquis, recommend evaluation with upper endoscopy.  She will continue on PPI until endoscopy results are available.  We discussed avoiding long-term PPI use as a form of management for chronic acid reflux control.  If her endoscopy comes back benign, we will transition to Pepcid for reflux control. CBC does not show any evidence of anemia indicating a bleeding gastric ulcer.  Discussed importance of weight loss and decreasing alcohol intake.  She acknowledged recognition of this and will work towards cutting back on alcohol and making lifestyle modifications to assist in weight loss.  We did discuss the option of starting a GLP-1 agonist to assist her in weight loss.  We will await upper endoscopy results prior to initiating treatment.    The longitudinal plan of care for the diagnosis(es)/condition(s) as documented were addressed during this visit. Due to the added complexity in care, I will continue to support Sharon in the subsequent management and with ongoing continuity of care.                 BMI  Estimated body mass index is 35.76 kg/m  as calculated from the following:    Height as of this encounter: 1.638 m (5' 4.5\").    Weight as of this encounter: 96 kg (211 lb 9.6 oz).           No follow-ups on file.      ANNIKA Muñiz [facility]    Review of Systems   Respiratory:  Negative for cough and shortness of breath.    Cardiovascular:  Negative for chest pain and " palpitations.   Gastrointestinal:  Positive for heartburn. Negative for abdominal distention, abdominal pain, anal bleeding, constipation, diarrhea, hematochezia, nausea and vomiting.         Subjective   Sharon is a 54 year old, presenting for the following health issues:  Stomach issues (Acid Reflex, GERD symptoms )    Patient presents to clinic with a concern of acid reflux that developed 6 months ago.  This developed shortly after starting Eliquis.  She was started on Eliquis after DVT and PE, follows with hematology and was found to be heterozygous for factor V Leiden.  She states that she has been taking omeprazole over the last couple months which has been helpful in her symptoms.  She states her symptoms are worse at night when laying down, will feel a rush of gastric contents reflux into her mouth.  She reports drinking 1 alcoholic drink at night, this has not changed recently. Does not smoke.  She denies having history of reflux in the past.  Denies any epigastric pain, blood in stool, changes in bowel habits, nausea or vomiting.  Denies any chest pain or shortness of breath. She has had some progressive weight gain as well and has been working on managing her mental health which she has noticed improvement from.       Wt Readings from Last 5 Encounters:   02/05/25 96 kg (211 lb 9.6 oz)   10/30/24 93.2 kg (205 lb 8 oz)   06/13/24 93.2 kg (205 lb 6.4 oz)   05/20/24 93.3 kg (205 lb 9.6 oz)   04/05/24 90.7 kg (200 lb)       History of Present Illness       Reason for visit:  Acid reflux issues  Symptom onset:  More than a month  Symptoms include:  Wake up oughing and choking on the acid feeling of food in my throat  Symptom intensity:  Severe  Symptom progression:  Worsening  Had these symptoms before:  Yes  Has tried/received treatment for these symptoms:  No  What makes it worse:  Laying down  What makes it better:  I have been sleeping in the recliner   She is taking medications regularly.                    "  Objective    /83 (BP Location: Right arm, Patient Position: Sitting, Cuff Size: Adult Regular)   Pulse 91   Temp 96.9  F (36.1  C) (Tympanic)   Resp 18   Ht 1.638 m (5' 4.5\")   Wt 96 kg (211 lb 9.6 oz)   LMP 04/01/2014 (Approximate)   SpO2 96%   BMI 35.76 kg/m    Body mass index is 35.76 kg/m .  Physical Exam  Vitals reviewed.   Constitutional:       General: She is not in acute distress.     Appearance: She is well-developed. She is obese.   HENT:      Head: Normocephalic and atraumatic.      Nose: Nose normal.      Mouth/Throat:      Pharynx: No oropharyngeal exudate.   Eyes:      General: No scleral icterus.     Conjunctiva/sclera: Conjunctivae normal.      Pupils: Pupils are equal, round, and reactive to light.   Neck:      Thyroid: No thyromegaly.   Cardiovascular:      Rate and Rhythm: Normal rate and regular rhythm.      Heart sounds: No murmur heard.  Pulmonary:      Effort: Pulmonary effort is normal. No respiratory distress.      Breath sounds: Normal breath sounds. No wheezing.   Musculoskeletal:         General: No tenderness or deformity. Normal range of motion.      Cervical back: Normal range of motion and neck supple.   Skin:     General: Skin is warm and dry.      Findings: No rash.   Neurological:      Mental Status: She is alert and oriented to person, place, and time.      Cranial Nerves: No cranial nerve deficit.      Coordination: Coordination normal.   Psychiatric:         Behavior: Behavior normal.         Thought Content: Thought content normal.         Judgment: Judgment normal.        Results for orders placed or performed in visit on 02/05/25   CBC with platelets and differential     Status: None   Result Value Ref Range    WBC Count 5.9 4.0 - 11.0 10e3/uL    RBC Count 4.76 3.80 - 5.20 10e6/uL    Hemoglobin 14.8 11.7 - 15.7 g/dL    Hematocrit 43.9 35.0 - 47.0 %    MCV 92 78 - 100 fL    MCH 31.1 26.5 - 33.0 pg    MCHC 33.7 31.5 - 36.5 g/dL    RDW 12.4 10.0 - 15.0 %    " Platelet Count 291 150 - 450 10e3/uL    % Neutrophils 56 %    % Lymphocytes 32 %    % Monocytes 8 %    % Eosinophils 3 %    % Basophils 2 %    % Immature Granulocytes 0 %    NRBCs per 100 WBC 0 <1 /100    Absolute Neutrophils 3.3 1.6 - 8.3 10e3/uL    Absolute Lymphocytes 1.9 0.8 - 5.3 10e3/uL    Absolute Monocytes 0.5 0.0 - 1.3 10e3/uL    Absolute Eosinophils 0.2 0.0 - 0.7 10e3/uL    Absolute Basophils 0.1 0.0 - 0.2 10e3/uL    Absolute Immature Granulocytes 0.0 <=0.4 10e3/uL    Absolute NRBCs 0.0 10e3/uL   CBC and Differential     Status: None    Narrative    The following orders were created for panel order CBC and Differential.  Procedure                               Abnormality         Status                     ---------                               -----------         ------                     CBC with platelets and d...[[id number]]                      Final result                 Please view results for these tests on the individual orders.                   Signed Electronically by: ANNIKA Muñiz CNP

## 2025-02-05 NOTE — TELEPHONE ENCOUNTER
[facility] Diagnostic Referral    Patient has a referral for an EGD with a diagnosis of Gastroesophageal reflux disease, unspecified whether esophagitis present.    Please advise.    Thank you,  Rut Calzada on 2/5/2025 at 3:49 PM

## 2025-02-24 RX ORDER — OMEPRAZOLE 20 MG/1
20 CAPSULE, DELAYED RELEASE ORAL DAILY
Status: ON HOLD | COMMUNITY
End: 2025-02-27

## 2025-02-27 ENCOUNTER — ANESTHESIA (OUTPATIENT)
Dept: SURGERY | Facility: OTHER | Age: 55
End: 2025-02-27
Payer: COMMERCIAL

## 2025-02-27 ENCOUNTER — ANESTHESIA EVENT (OUTPATIENT)
Dept: SURGERY | Facility: OTHER | Age: 55
End: 2025-02-27
Payer: COMMERCIAL

## 2025-02-27 ENCOUNTER — HOSPITAL ENCOUNTER (OUTPATIENT)
Facility: OTHER | Age: 55
Discharge: HOME | End: 2025-02-27
Attending: SURGERY | Admitting: SURGERY
Payer: COMMERCIAL

## 2025-02-27 VITALS
SYSTOLIC BLOOD PRESSURE: 120 MMHG | HEIGHT: 65 IN | OXYGEN SATURATION: 94 % | BODY MASS INDEX: 34.16 KG/M2 | WEIGHT: 205 LBS | DIASTOLIC BLOOD PRESSURE: 68 MMHG | TEMPERATURE: 97.2 F | HEART RATE: 84 BPM

## 2025-02-27 DIAGNOSIS — K21.9 GASTROESOPHAGEAL REFLUX DISEASE WITHOUT ESOPHAGITIS: Primary | ICD-10-CM

## 2025-02-27 PROCEDURE — 43235 EGD DIAGNOSTIC BRUSH WASH: CPT | Performed by: SURGERY

## 2025-02-27 PROCEDURE — 250N000009 HC RX 250: Performed by: NURSE ANESTHETIST, CERTIFIED REGISTERED

## 2025-02-27 PROCEDURE — 258N000003 HC RX IP 258 OP 636: Performed by: SURGERY

## 2025-02-27 PROCEDURE — 88305 TISSUE EXAM BY PATHOLOGIST: CPT

## 2025-02-27 PROCEDURE — 250N000011 HC RX IP 250 OP 636: Performed by: NURSE ANESTHETIST, CERTIFIED REGISTERED

## 2025-02-27 PROCEDURE — 43239 EGD BIOPSY SINGLE/MULTIPLE: CPT | Performed by: SURGERY

## 2025-02-27 PROCEDURE — 999N000010 HC STATISTIC ANES STAT CODE-CRNA PER MINUTE: Performed by: SURGERY

## 2025-02-27 RX ORDER — LIDOCAINE HYDROCHLORIDE 20 MG/ML
INJECTION, SOLUTION INFILTRATION; PERINEURAL PRN
Status: DISCONTINUED | OUTPATIENT
Start: 2025-02-27 | End: 2025-02-27

## 2025-02-27 RX ORDER — FLUMAZENIL 0.1 MG/ML
0.2 INJECTION, SOLUTION INTRAVENOUS
Status: DISCONTINUED | OUTPATIENT
Start: 2025-02-27 | End: 2025-02-27 | Stop reason: HOSPADM

## 2025-02-27 RX ORDER — OMEPRAZOLE 40 MG/1
40 CAPSULE, DELAYED RELEASE ORAL DAILY
Qty: 90 CAPSULE | Refills: 4 | Status: SHIPPED | OUTPATIENT
Start: 2025-02-27

## 2025-02-27 RX ORDER — PROPOFOL 10 MG/ML
INJECTION, EMULSION INTRAVENOUS CONTINUOUS PRN
Status: DISCONTINUED | OUTPATIENT
Start: 2025-02-27 | End: 2025-02-27

## 2025-02-27 RX ORDER — ONDANSETRON 4 MG/1
4 TABLET, ORALLY DISINTEGRATING ORAL EVERY 6 HOURS PRN
Status: DISCONTINUED | OUTPATIENT
Start: 2025-02-27 | End: 2025-02-27 | Stop reason: HOSPADM

## 2025-02-27 RX ORDER — SODIUM CHLORIDE, SODIUM LACTATE, POTASSIUM CHLORIDE, CALCIUM CHLORIDE 600; 310; 30; 20 MG/100ML; MG/100ML; MG/100ML; MG/100ML
INJECTION, SOLUTION INTRAVENOUS CONTINUOUS
Status: DISCONTINUED | OUTPATIENT
Start: 2025-02-27 | End: 2025-02-27 | Stop reason: HOSPADM

## 2025-02-27 RX ORDER — PROPOFOL 10 MG/ML
INJECTION, EMULSION INTRAVENOUS PRN
Status: DISCONTINUED | OUTPATIENT
Start: 2025-02-27 | End: 2025-02-27

## 2025-02-27 RX ORDER — NALOXONE HYDROCHLORIDE 0.4 MG/ML
0.2 INJECTION, SOLUTION INTRAMUSCULAR; INTRAVENOUS; SUBCUTANEOUS
Status: DISCONTINUED | OUTPATIENT
Start: 2025-02-27 | End: 2025-02-27 | Stop reason: HOSPADM

## 2025-02-27 RX ORDER — LIDOCAINE 40 MG/G
CREAM TOPICAL
Status: DISCONTINUED | OUTPATIENT
Start: 2025-02-27 | End: 2025-02-27 | Stop reason: HOSPADM

## 2025-02-27 RX ORDER — ONDANSETRON 2 MG/ML
4 INJECTION INTRAMUSCULAR; INTRAVENOUS EVERY 6 HOURS PRN
Status: DISCONTINUED | OUTPATIENT
Start: 2025-02-27 | End: 2025-02-27 | Stop reason: HOSPADM

## 2025-02-27 RX ORDER — NALOXONE HYDROCHLORIDE 0.4 MG/ML
0.4 INJECTION, SOLUTION INTRAMUSCULAR; INTRAVENOUS; SUBCUTANEOUS
Status: DISCONTINUED | OUTPATIENT
Start: 2025-02-27 | End: 2025-02-27 | Stop reason: HOSPADM

## 2025-02-27 RX ORDER — OMEPRAZOLE 20 MG/1
20 CAPSULE, DELAYED RELEASE ORAL DAILY
Qty: 90 CAPSULE | Refills: 11 | Status: SHIPPED | OUTPATIENT
Start: 2025-02-27

## 2025-02-27 RX ADMIN — PROPOFOL 100 MG: 10 INJECTION, EMULSION INTRAVENOUS at 15:47

## 2025-02-27 RX ADMIN — LIDOCAINE HYDROCHLORIDE 40 MG: 20 INJECTION, SOLUTION INFILTRATION; PERINEURAL at 15:47

## 2025-02-27 RX ADMIN — SODIUM CHLORIDE, SODIUM LACTATE, POTASSIUM CHLORIDE, AND CALCIUM CHLORIDE 30 ML/HR: .6; .31; .03; .02 INJECTION, SOLUTION INTRAVENOUS at 13:23

## 2025-02-27 RX ADMIN — PROPOFOL 160 MCG/KG/MIN: 10 INJECTION, EMULSION INTRAVENOUS at 15:47

## 2025-02-27 ASSESSMENT — ACTIVITIES OF DAILY LIVING (ADL)
ADLS_ACUITY_SCORE: 41

## 2025-02-27 ASSESSMENT — LIFESTYLE VARIABLES: TOBACCO_USE: 1

## 2025-02-27 NOTE — INTERVAL H&P NOTE
"I have reviewed the surgical (or preoperative) H&P that is linked to this encounter, and examined the patient. There are no significant changes    Clinical Conditions Present on Arrival:  Clinically Significant Risk Factors Present on Admission                 # Drug Induced Coagulation Defect: home medication list includes an anticoagulant medication       # Obesity: Estimated body mass index is 34.64 kg/m  as calculated from the following:    Height as of this encounter: 1.638 m (5' 4.5\").    Weight as of this encounter: 93 kg (205 lb).       "

## 2025-02-27 NOTE — ANESTHESIA POSTPROCEDURE EVALUATION
Patient: Johanne Fish    Procedure: Procedure(s):  Esophagoscopy, gastroscopy, duodenoscopy (EGD), combined WITH BIOPSIES       Anesthesia Type:  MAC    Note:  Disposition: Outpatient   Postop Pain Control: Uneventful            Sign Out: Well controlled pain   PONV: No   Neuro/Psych: Uneventful            Sign Out: Acceptable/Baseline neuro status   Airway/Respiratory: Uneventful            Sign Out: Acceptable/Baseline resp. status   CV/Hemodynamics: Uneventful            Sign Out: Acceptable CV status; No obvious hypovolemia; No obvious fluid overload   Other NRE: NONE   DID A NON-ROUTINE EVENT OCCUR? No       Last vitals:  Vitals Value Taken Time   /90 02/27/25 1628   Temp 97.2  F (36.2  C) 02/27/25 1557   Pulse 80 02/27/25 1628   Resp     SpO2 96 % 02/27/25 1642   Vitals shown include unfiled device data.    Electronically Signed By: ANNIKA Garner CRNA  February 27, 2025  4:44 PM

## 2025-02-27 NOTE — OR NURSING
Patient has been discharged to home at 1655 via ambulatory accompanied by her S.O.    Written discharge instructions were provided to patient.  Prescriptions were e-scribed to [facility] for .  Patient states their pain is 0/10, and denies any nausea or dizziness upon discharge.    Patient and adult caring for them verbalize understanding of discharge instructions including no driving until tomorrow and no longer taking narcotic pain medications - no operating mechanical equipment and no making any important decisions.They understand reason for discharge, and necessary follow-up appointments.       Tamiko Vail RN

## 2025-02-27 NOTE — ANESTHESIA CARE TRANSFER NOTE
Patient: Johanne Fish    Procedure: Procedure(s):  Esophagoscopy, gastroscopy, duodenoscopy (EGD), combined WITH BIOPSIES       Diagnosis: GERD (gastroesophageal reflux disease) [K21.9]  Diagnosis Additional Information: No value filed.    Anesthesia Type:   MAC     Note:    Oropharynx: oropharynx clear of all foreign objects  Level of Consciousness: awake  Oxygen Supplementation: nasal cannula  Level of Supplemental Oxygen (L/min / FiO2): 2  Independent Airway: airway patency satisfactory and stable  Dentition: dentition unchanged  Vital Signs Stable: post-procedure vital signs reviewed and stable  Report to RN Given: handoff report given  Patient transferred to: Phase II    Handoff Report: Identifed the Patient, Identified the Reponsible Provider, Reviewed the pertinent medical history, Discussed the surgical course, Reviewed Intra-OP anesthesia mangement and issues during anesthesia, Set expectations for post-procedure period and Allowed opportunity for questions and acknowledgement of understanding  Vitals:  Vitals Value Taken Time   BP     Temp     Pulse     Resp     SpO2         Electronically Signed By: ANNIKA Garner CRNA  February 27, 2025  3:58 PM

## 2025-02-27 NOTE — ANESTHESIA PREPROCEDURE EVALUATION
Anesthesia Pre-Procedure Evaluation    Patient: Johanne Fish   MRN: [id number] : 1970        Procedure : Procedure(s):  Esophagoscopy, gastroscopy, duodenoscopy (EGD), combined          Past Medical History:   Diagnosis Date     Dysplasia of cervix uteri     ,abnormal Paps, treated by loop excision.     Lichen sclerosus et atrophicus     ,external genital biopsy     Low back pain     Intermittent     Other premature depolarization     No Comments Provided     Phlebitis and thrombophlebitis of other deep vessels of unspecified lower extremity (H)     2010      Past Surgical History:   Procedure Laterality Date     BIOPSY BREAST  1997    benign disease.     BIOPSY BREAST      Benign  Stereotactic R breast Bx [Other][     COLONOSCOPY N/A 2020    F/U  hyperplastic and scattered diverticulosis     CONIZATION LEEP      2001,Loop     LAPAROSCOPIC ABLATION ENDOMETRIOSIS      2010     TONSILLECTOMY, ADENOIDECTOMY, COMBINED      No Comments Provided      No Known Allergies   Social History     Tobacco Use     Smoking status: Former     Current packs/day: 0.00     Average packs/day: 1 pack/day for 10.0 years (10.0 ttl pk-yrs)     Types: Cigarettes     Start date: 1996     Quit date: 2006     Years since quittin.1     Passive exposure: Past     Smokeless tobacco: Never     Tobacco comments:     Passive exposure in childhood and adult homes.    Substance Use Topics     Alcohol use: Yes     Alcohol/week: 2.5 standard drinks of alcohol     Types: 3 Standard drinks or equivalent per week     Comment: 2 per day      Wt Readings from Last 1 Encounters:   25 93 kg (205 lb)        Anesthesia Evaluation   Pt has had prior anesthetic.     No history of anesthetic complications       ROS/MED HX  ENT/Pulmonary:     (+)                tobacco use, Past use,                       Neurologic:       Cardiovascular:       METS/Exercise Tolerance: >4 METS    Hematologic:      (+) History of blood clots,    pt is anticoagulated,           Musculoskeletal:       GI/Hepatic:     (+) GERD, Asymptomatic on medication,                  Renal/Genitourinary:       Endo:     (+)          thyroid problem,     Obesity,       Psychiatric/Substance Use:     (+) psychiatric history anxiety and depression       Infectious Disease:       Malignancy:       Other:          Physical Exam    Airway  airway exam normal      Mallampati: II   TM distance: > 3 FB   Neck ROM: full   Mouth opening: > 3 cm    Respiratory Devices and Support         Dental       (+) Minor Abnormalities - some fillings, tiny chips      Cardiovascular   cardiovascular exam normal       Rhythm and rate: regular and normal     Pulmonary   pulmonary exam normal        breath sounds clear to auscultation       OUTSIDE LABS:  CBC:   Lab Results   Component Value Date    WBC 5.9 02/05/2025    WBC 7.7 10/30/2024    HGB 14.8 02/05/2025    HGB 14.5 10/30/2024    HCT 43.9 02/05/2025    HCT 43.4 10/30/2024     02/05/2025     10/30/2024     BMP:   Lab Results   Component Value Date     10/30/2024     04/03/2023    POTASSIUM 4.2 10/30/2024    POTASSIUM 4.1 04/03/2023    CHLORIDE 104 10/30/2024    CHLORIDE 104 04/03/2023    CO2 28 10/30/2024    CO2 25 04/03/2023    BUN 12.9 10/30/2024    BUN 13.2 04/03/2023    CR 0.93 10/30/2024    CR 0.86 04/03/2023    GLC 87 10/30/2024    GLC 88 04/03/2023     COAGS:   Lab Results   Component Value Date    INR 1.07 03/27/2023     POC:   Lab Results   Component Value Date    HCG Negative 09/04/2013     HEPATIC:   Lab Results   Component Value Date    ALBUMIN 4.5 10/30/2024    PROTTOTAL 6.9 10/30/2024    ALT 27 10/30/2024    AST 21 10/30/2024    ALKPHOS 83 10/30/2024    BILITOTAL 0.4 10/30/2024     OTHER:   Lab Results   Component Value Date    A1C 4.9 11/14/2022    DANIELITO 9.4 10/30/2024    TSH 2.13 10/30/2024    SED 3 01/08/2018       Anesthesia Plan    ASA Status:  2    NPO Status:  NPO  "Appropriate    Anesthesia Type: MAC.     - Reason for MAC: straight local not clinically adequate   Induction: Intravenous.   Maintenance: Balanced.        Consents    Anesthesia Plan(s) and associated risks, benefits, and realistic alternatives discussed. Questions answered and patient/representative(s) expressed understanding.     - Discussed: Risks, Benefits and Alternatives for BOTH SEDATION and the PROCEDURE were discussed     - Discussed with:  Patient, Spouse      - Extended Intubation/Ventilatory Support Discussed: No.      - Patient is DNR/DNI Status: No     Use of blood products discussed: No .     Postoperative Care            Comments:    Other Comments: Risks, benefits and alternatives discussed and would like to proceed. General anesthesia ok if indicated.              ANNIKA Garner CRNA    I have reviewed the pertinent notes and labs in the chart from the past 30 days and (re)examined the patient.  Any updates or changes from those notes are reflected in this note.    Clinically Significant Risk Factors Present on Admission                # Drug Induced Coagulation Defect: home medication list includes an anticoagulant medication              # Obesity: Estimated body mass index is 34.64 kg/m  as calculated from the following:    Height as of this encounter: 1.638 m (5' 4.5\").    Weight as of this encounter: 93 kg (205 lb).                "

## 2025-03-02 ENCOUNTER — OFFICE VISIT (OUTPATIENT)
Dept: FAMILY MEDICINE | Facility: OTHER | Age: 55
End: 2025-03-02
Payer: COMMERCIAL

## 2025-03-02 VITALS
OXYGEN SATURATION: 98 % | BODY MASS INDEX: 35.83 KG/M2 | DIASTOLIC BLOOD PRESSURE: 80 MMHG | WEIGHT: 212 LBS | SYSTOLIC BLOOD PRESSURE: 126 MMHG | RESPIRATION RATE: 20 BRPM | TEMPERATURE: 97.6 F | HEART RATE: 93 BPM

## 2025-03-02 DIAGNOSIS — R50.9 FEVER IN ADULT: ICD-10-CM

## 2025-03-02 DIAGNOSIS — R05.1 ACUTE COUGH: ICD-10-CM

## 2025-03-02 DIAGNOSIS — J06.9 VIRAL URI WITH COUGH: Primary | ICD-10-CM

## 2025-03-02 DIAGNOSIS — J02.9 SORE THROAT: ICD-10-CM

## 2025-03-02 LAB
FLUAV RNA SPEC QL NAA+PROBE: NEGATIVE
FLUBV RNA RESP QL NAA+PROBE: NEGATIVE
RSV RNA SPEC NAA+PROBE: NEGATIVE
S PYO DNA THROAT QL NAA+PROBE: NOT DETECTED
SARS-COV-2 RNA RESP QL NAA+PROBE: NEGATIVE

## 2025-03-02 PROCEDURE — 3079F DIAST BP 80-89 MM HG: CPT

## 2025-03-02 PROCEDURE — 3074F SYST BP LT 130 MM HG: CPT

## 2025-03-02 PROCEDURE — 1125F AMNT PAIN NOTED PAIN PRSNT: CPT

## 2025-03-02 PROCEDURE — 87637 SARSCOV2&INF A&B&RSV AMP PRB: CPT | Mod: ZL

## 2025-03-02 PROCEDURE — 87651 STREP A DNA AMP PROBE: CPT | Mod: ZL

## 2025-03-02 PROCEDURE — 99213 OFFICE O/P EST LOW 20 MIN: CPT

## 2025-03-02 PROCEDURE — 250N000009 HC RX 250: Mod: JW

## 2025-03-02 RX ORDER — DEXAMETHASONE SODIUM PHOSPHATE 4 MG/ML
10 VIAL (ML) INJECTION ONCE
Status: COMPLETED | OUTPATIENT
Start: 2025-03-02 | End: 2025-03-02

## 2025-03-02 RX ADMIN — DEXAMETHASONE SODIUM PHOSPHATE 10 MG: 4 INJECTION, SOLUTION INTRAMUSCULAR; INTRAVENOUS at 10:45

## 2025-03-02 ASSESSMENT — ENCOUNTER SYMPTOMS
CARDIOVASCULAR NEGATIVE: 1
DIARRHEA: 0
NAUSEA: 0
SINUS PRESSURE: 0
FEVER: 1
SINUS PAIN: 0
SORE THROAT: 1
CHILLS: 1
VOMITING: 0
COUGH: 1

## 2025-03-02 ASSESSMENT — PAIN SCALES - GENERAL: PAINLEVEL_OUTOF10: MILD PAIN (3)

## 2025-03-02 NOTE — PROGRESS NOTES
Johanne Fish  1970    ASSESSMENT/PLAN    1. Viral URI with cough (Primary)  2. Acute cough  - Influenza A/B, RSV and SARS-CoV2 PCR (COVID-19) Nose; Standing  - Influenza A/B, RSV and SARS-CoV2 PCR (COVID-19) Nose  3. Sore throat  - Group A Streptococcus PCR Throat Swab; Standing  - Group A Streptococcus PCR Throat Swab  - dexAMETHasone (DECADRON) injectable solution used ORALLY 10 mg  4. Fever in adult    - Influenza, COVID, RSV testing negative.  Group A strep negative.  - Decadron 10 mg oral solution provided in clinic.  Tolerated well.  - Discussed with patient that symptoms and exam are consistent with viral illness.    - No clinical indications for antibiotic treatment at this time.  - Symptomatic treatment - Encouraged fluids, salt water gargles, honey, humidifier, saline nasal spray, lozenges, tea, soup, smoothies, popsicles, topical vapor rub, rest, etc   - May use over-the-counter Tylenol or ibuprofen PRN  - Follow up as needed for new or worsening symptoms          *Explanation of diagnosis, treatment options and risk and benefits of medications reviewed with patient. Patient agrees with plan of care.  *All questions were answered.    *Red flags symptoms were discussed and patient was advised when they should return for reevaluation or for prompt emergency evaluation.   *Patient was given verbal and written instructions on plan of care. Instructions were printed or are available on [email or web address] on electronic AVS.   *We discussed potential side effects of any prescribed or recommended therapies, as well as expectations for response to treatments.  *Patient discharged in stable condition    Ismael Moctezuma, DNP, APRN, FNP-C  [facility]    SUBJECTIVE  CHIEF COMPLAINT/ REASON FOR VISIT  Patient presents with:  Fever  Cough  Pharyngitis     HISTORY OF PRESENT ILLNESS  Johanne Fish is a pleasant 54 year old female presents to [facility] clinic today with cough.  Since Friday patient  has had a productive cough, sore throat, and low-grade fever.  Patient reports she had an upper endoscopy completed on Thursday and after this procedure developed the symptoms.  She reports this endoscopy was completed due to history of acid reflux.  Patient reports she has a history of DVT and is on Eliquis 5 mg twice daily.  She denies any nausea, vomiting or diarrhea.    History provided by patient      I have reviewed the nursing notes.  I have reviewed allergies, medication list, problem list, and past medical history.    REVIEW OF SYSTEMS  Review of Systems   Constitutional:  Positive for chills and fever.   HENT:  Positive for sore throat. Negative for congestion, ear discharge, ear pain, sinus pressure and sinus pain.    Respiratory:  Positive for cough.    Cardiovascular: Negative.    Gastrointestinal:  Negative for diarrhea, nausea and vomiting.   Skin: Negative.         VITAL SIGNS  Vitals:    03/02/25 1007   BP: 126/80   BP Location: Right arm   Patient Position: Sitting   Cuff Size: Adult Large   Pulse: 93   Resp: 20   Temp: 97.6  F (36.4  C)   TempSrc: Temporal   SpO2: 98%   Weight: 96.2 kg (212 lb)      Body mass index is 35.83 kg/m .      OBJECTIVE  PHYSICAL EXAM  Physical Exam  Vitals and nursing note reviewed.   Constitutional:       General: She is not in acute distress.     Appearance: Normal appearance. She is normal weight. She is not ill-appearing, toxic-appearing or diaphoretic.   HENT:      Head: Normocephalic and atraumatic.      Right Ear: Tympanic membrane, ear canal and external ear normal. There is no impacted cerumen.      Left Ear: Tympanic membrane, ear canal and external ear normal. There is no impacted cerumen.      Nose: Nose normal. No congestion or rhinorrhea.      Mouth/Throat:      Mouth: Mucous membranes are moist.      Pharynx: Oropharynx is clear. Posterior oropharyngeal erythema present. No oropharyngeal exudate.   Eyes:      Extraocular Movements: Extraocular movements  intact.      Conjunctiva/sclera: Conjunctivae normal.      Pupils: Pupils are equal, round, and reactive to light.   Cardiovascular:      Rate and Rhythm: Normal rate and regular rhythm.      Pulses: Normal pulses.      Heart sounds: Normal heart sounds.   Pulmonary:      Effort: Pulmonary effort is normal. No respiratory distress.      Breath sounds: Normal breath sounds. No wheezing or rhonchi.   Musculoskeletal:      Cervical back: Normal range of motion. No rigidity or tenderness.   Lymphadenopathy:      Cervical: No cervical adenopathy.   Neurological:      Mental Status: She is alert.            DIAGNOSTICS  Results for orders placed or performed in visit on 03/02/25   Influenza A/B, RSV and SARS-CoV2 PCR (COVID-19) Nose     Status: Normal    Specimen: Nose; Swab   Result Value Ref Range    Influenza A PCR Negative Negative    Influenza B PCR Negative Negative    RSV PCR Negative Negative    SARS CoV2 PCR Negative Negative    Narrative    Testing was performed using the Xpert Xpress CoV2/Flu/RSV Assay on the [employer] GeneXpert Instrument. This test should be ordered for the detection of SARS-CoV2, influenza, and RSV viruses in individuals with signs and symptoms of respiratory tract infection. This test is for in vitro diagnostic use under the [place] FDA for laboratories certified under CLIA to perform high or moderate complexity testing. This test has been [place] FDA cleared. A negative result does not rule out the presence of PCR inhibitors in the specimen or target RNA in concentration below the limit of detection for the assay. If only one viral target is positive but coinfection with multiple targets is suspected, the sample should be re-tested with another FDA cleared, approved, or authorized test, if coninfection would change clinical management. This test was validated by the [facility]. These laboratories are certified under the Clinical Laboratory Improvement Amendments of 1988 (CLIA-88)  as qualified to perfom high complexity laboratory testing.   Group A Streptococcus PCR Throat Swab     Status: Normal    Specimen: Throat; Swab   Result Value Ref Range    Group A strep by PCR Not Detected Not Detected    Narrative    The Xpert Xpress Strep A test, performed on the [employer]  Instrument Systems, is a rapid, qualitative in vitro diagnostic test for the detection of Streptococcus pyogenes (Group A ß-hemolytic Streptococcus, Strep A) in throat swab specimens from patients with signs and symptoms of pharyngitis. The Xpert Xpress Strep A test can be used as an aid in the diagnosis of Group A Streptococcal pharyngitis. The assay is not intended to monitor treatment for Group A Streptococcus infections. The Xpert Xpress Strep A test utilizes an automated real-time polymerase chain reaction (PCR) to detect Streptococcus pyogenes DNA.

## 2025-03-02 NOTE — NURSING NOTE
"Chief Complaint   Patient presents with    Fever    Cough    Pharyngitis     Patient presents with fever, cough, and sore throat which has been happening since Friday.  Had an upper endoscopy on Thursday.  Currently rates pain 3/10 in throat.      Initial /80 (BP Location: Right arm, Patient Position: Sitting, Cuff Size: Adult Large)   Pulse 93   Temp 97.6  F (36.4  C) (Temporal)   Resp 20   Wt 96.2 kg (212 lb)   LMP 04/01/2014 (Approximate)   SpO2 98%   BMI 35.83 kg/m   Estimated body mass index is 35.83 kg/m  as calculated from the following:    Height as of 2/27/25: 1.638 m (5' 4.5\").    Weight as of this encounter: 96.2 kg (212 lb).  Medication Review: complete    The next two questions are to help us understand your food security.  If you are feeling you need any assistance in this area, we have resources available to support you today.          10/26/2024   SDOH- Food Insecurity   Within the past 12 months, did you worry that your food would run out before you got money to buy more? N   Within the past 12 months, did the food you bought just not last and you didn t have money to get more? N         Health Care Directive:  Patient does not have a Health Care Directive: Discussed advance care planning with patient; however, patient declined at this time.    Abigail Garrido LPN      "

## 2025-03-04 ENCOUNTER — OFFICE VISIT (OUTPATIENT)
Dept: FAMILY MEDICINE | Facility: OTHER | Age: 55
End: 2025-03-04
Attending: FAMILY MEDICINE
Payer: COMMERCIAL

## 2025-03-04 ENCOUNTER — HOSPITAL ENCOUNTER (OUTPATIENT)
Dept: GENERAL RADIOLOGY | Facility: OTHER | Age: 55
Discharge: HOME | End: 2025-03-04
Attending: FAMILY MEDICINE
Payer: COMMERCIAL

## 2025-03-04 VITALS
HEIGHT: 65 IN | RESPIRATION RATE: 18 BRPM | DIASTOLIC BLOOD PRESSURE: 86 MMHG | WEIGHT: 213 LBS | BODY MASS INDEX: 35.49 KG/M2 | SYSTOLIC BLOOD PRESSURE: 130 MMHG | HEART RATE: 109 BPM | OXYGEN SATURATION: 98 % | TEMPERATURE: 98.3 F

## 2025-03-04 DIAGNOSIS — J06.9 VIRAL URI WITH COUGH: Primary | ICD-10-CM

## 2025-03-04 DIAGNOSIS — R05.1 ACUTE COUGH: ICD-10-CM

## 2025-03-04 LAB
PATH REPORT.COMMENTS IMP SPEC: NORMAL
PATH REPORT.FINAL DX SPEC: NORMAL
PATH REPORT.RELEVANT HX SPEC: NORMAL
PHOTO IMAGE: NORMAL

## 2025-03-04 PROCEDURE — 71046 X-RAY EXAM CHEST 2 VIEWS: CPT

## 2025-03-04 ASSESSMENT — PATIENT HEALTH QUESTIONNAIRE - PHQ9
SUM OF ALL RESPONSES TO PHQ QUESTIONS 1-9: 4
SUM OF ALL RESPONSES TO PHQ QUESTIONS 1-9: 4
10. IF YOU CHECKED OFF ANY PROBLEMS, HOW DIFFICULT HAVE THESE PROBLEMS MADE IT FOR YOU TO DO YOUR WORK, TAKE CARE OF THINGS AT HOME, OR GET ALONG WITH OTHER PEOPLE: SOMEWHAT DIFFICULT

## 2025-03-04 ASSESSMENT — ANXIETY QUESTIONNAIRES
6. BECOMING EASILY ANNOYED OR IRRITABLE: SEVERAL DAYS
GAD7 TOTAL SCORE: 2
GAD7 TOTAL SCORE: 2
IF YOU CHECKED OFF ANY PROBLEMS ON THIS QUESTIONNAIRE, HOW DIFFICULT HAVE THESE PROBLEMS MADE IT FOR YOU TO DO YOUR WORK, TAKE CARE OF THINGS AT HOME, OR GET ALONG WITH OTHER PEOPLE: SOMEWHAT DIFFICULT
8. IF YOU CHECKED OFF ANY PROBLEMS, HOW DIFFICULT HAVE THESE MADE IT FOR YOU TO DO YOUR WORK, TAKE CARE OF THINGS AT HOME, OR GET ALONG WITH OTHER PEOPLE?: SOMEWHAT DIFFICULT
5. BEING SO RESTLESS THAT IT IS HARD TO SIT STILL: NOT AT ALL
7. FEELING AFRAID AS IF SOMETHING AWFUL MIGHT HAPPEN: NOT AT ALL
3. WORRYING TOO MUCH ABOUT DIFFERENT THINGS: SEVERAL DAYS
1. FEELING NERVOUS, ANXIOUS, OR ON EDGE: NOT AT ALL
4. TROUBLE RELAXING: NOT AT ALL
2. NOT BEING ABLE TO STOP OR CONTROL WORRYING: NOT AT ALL
7. FEELING AFRAID AS IF SOMETHING AWFUL MIGHT HAPPEN: NOT AT ALL
GAD7 TOTAL SCORE: 2

## 2025-03-04 ASSESSMENT — PAIN SCALES - GENERAL: PAINLEVEL_OUTOF10: MODERATE PAIN (5)

## 2025-03-04 NOTE — NURSING NOTE
"Chief Complaint   Patient presents with    Cough       Initial /86   Pulse 109   Temp 98.3  F (36.8  C) (Tympanic)   Resp 18   Ht 1.638 m (5' 4.5\")   Wt 96.6 kg (213 lb)   LMP 04/01/2014 (Approximate)   SpO2 98%   BMI 36.00 kg/m   Estimated body mass index is 36 kg/m  as calculated from the following:    Height as of this encounter: 1.638 m (5' 4.5\").    Weight as of this encounter: 96.6 kg (213 lb).  Medication Review: complete    The next two questions are to help us understand your food security.  If you are feeling you need any assistance in this area, we have resources available to support you today.          10/26/2024   SDOH- Food Insecurity   Within the past 12 months, did you worry that your food would run out before you got money to buy more? N   Within the past 12 months, did the food you bought just not last and you didn t have money to get more? N         Health Care Directive:  Patient does not have a Health Care Directive: Discussed advance care planning with patient; however, patient declined at this time.    Pham Licona LPN      "

## 2025-03-04 NOTE — PROGRESS NOTES
"  Assessment & Plan     1. Viral URI with cough (Primary)  CXR today is reassuring along with exam.  Discussed likely clinical expectation of viral illness worsening to day #4/5 (which is where she is at currently) and expect continued improvement over the next week.  If no further improvement by the end of the week; would consider abx therapy.  She can contact the office to request from myself.    2. Acute cough  Suspect URI.  CXR obtained today without significant findings.  - XR Chest 2 Views; Future      MDM:  Ordering of each unique test    BMI  Estimated body mass index is 36 kg/m  as calculated from the following:    Height as of this encounter: 1.638 m (5' 4.5\").    Weight as of this encounter: 96.6 kg (213 lb).   Weight management plan: not addressed in this acute visit    Follow up: antoni Dyson   Sharon is a 54 year old, presenting for the following health issues:  Cough        2/5/2025     8:44 AM   Additional Questions   Roomed by Herbert FU   Accompanied by Self     History of Present Illness       Reason for visit:  Worsening cough    She eats 2-3 servings of fruits and vegetables daily.She consumes 0 sweetened beverage(s) daily.She exercises with enough effort to increase her heart rate 10 to 19 minutes per day.  She exercises with enough effort to increase her heart rate 3 or less days per week.   She is taking medications regularly.      Sharon was seen in [facility] on Sunday, 3/2/25 - two days ago, for same symptoms.  Initially started acutely throughout Friday, 2/28/25 - productive cough, sore throat, low grade fever.  Coincidentally started to develop symptoms after having completed an EGD on the day prior (Thursday, 2/27/25).  EGD biopsy results are still pending/in process; but was completed due to acid reflux.    On Sunday - she tested negative for strep, RSV, influenza and Covid.  Was given Decadron due to the recent EGD procedure, otherwise recommended supportive cares for viral " "process.    Since Sunday: progressively worsening.  Hurts to cough, deep breathe.  OTC meds trying: Mucinex, ibuprofen.  + productive cough. + sore throat.  + headache.  No longer fever.    No known sick contacts.        Objective    /86   Pulse 109   Temp 98.3  F (36.8  C) (Tympanic)   Resp 18   Ht 1.638 m (5' 4.5\")   Wt 96.6 kg (213 lb)   LMP 04/01/2014 (Approximate)   SpO2 98%   BMI 36.00 kg/m    Body mass index is 36 kg/m .  Physical Exam   GENERAL: alert and no distress  EYES: Eyes grossly normal to inspection, PERRL and conjunctivae and sclerae normal  HENT: ear canals and TM's normal, nose and mouth without ulcers or lesions  NECK: no adenopathy, no asymmetry, masses, or scars  RESP: lungs clear to auscultation - no rales, rhonchi or wheezes.  Intermittent coughing throughout visit.  CV: regular rate and rhythm, normal S1 S2, no S3 or S4, no murmur, click or rub, no peripheral edema    No results found for any visits on 03/04/25.        Signed Electronically by: Zuly Dyer DO    "

## 2025-03-20 ENCOUNTER — OFFICE VISIT (OUTPATIENT)
Dept: FAMILY MEDICINE | Facility: OTHER | Age: 55
End: 2025-03-20
Attending: PHYSICIAN ASSISTANT
Payer: COMMERCIAL

## 2025-03-20 VITALS
SYSTOLIC BLOOD PRESSURE: 138 MMHG | BODY MASS INDEX: 35.62 KG/M2 | HEART RATE: 90 BPM | WEIGHT: 213.8 LBS | DIASTOLIC BLOOD PRESSURE: 83 MMHG | HEIGHT: 65 IN | OXYGEN SATURATION: 97 % | TEMPERATURE: 97.6 F | RESPIRATION RATE: 18 BRPM

## 2025-03-20 DIAGNOSIS — L98.9 SKIN LESION: Primary | ICD-10-CM

## 2025-03-20 ASSESSMENT — PAIN SCALES - GENERAL: PAINLEVEL_OUTOF10: NO PAIN (0)

## 2025-03-20 NOTE — NURSING NOTE
"Chief Complaint   Patient presents with    Derm Problem     Mole       Initial /83 (BP Location: Right arm, Patient Position: Sitting, Cuff Size: Adult Regular)   Pulse 90   Temp 97.6  F (36.4  C) (Tympanic)   Resp 18   Ht 1.638 m (5' 4.5\")   Wt 97 kg (213 lb 12.8 oz)   LMP 04/01/2014 (Approximate)   SpO2 97%   BMI 36.13 kg/m   Estimated body mass index is 36.13 kg/m  as calculated from the following:    Height as of this encounter: 1.638 m (5' 4.5\").    Weight as of this encounter: 97 kg (213 lb 12.8 oz).  Medication Review: complete    The next two questions are to help us understand your food security.  If you are feeling you need any assistance in this area, we have resources available to support you today.          10/26/2024   SDOH- Food Insecurity   Within the past 12 months, did you worry that your food would run out before you got money to buy more? N   Within the past 12 months, did the food you bought just not last and you didn t have money to get more? N         Health Care Directive:  Patient does not have a Health Care Directive: Discussed advance care planning with patient; however, patient declined at this time.    Cinthya Ruiz      "

## 2025-03-20 NOTE — PROGRESS NOTES
"  Assessment & Plan   Problem List Items Addressed This Visit    None  Visit Diagnoses       Skin lesion    -  Primary    Relevant Orders    Adult Gen Surg  Referral           Skin lesion: Refer to general surgery for consult for skin lesion removal on the left forehead.  Encouraged to set up a dermatology appointment if preferred for full skin check.       See Patient Instructions    No follow-ups on file.      Subjective   Sharon is a 54 year old, presenting for the following health issues:  Derm Problem (Mole)        3/20/2025     3:43 PM   Additional Questions   Roomed by Cinthya FU   Accompanied by self     History of Present Illness       Reason for visit:  Mole removal and check  Symptom onset:  3-4 weeks ago  Symptom intensity:  Mild  Symptom progression:  Staying the same  Had these symptoms before:  No  What makes it worse:  No  What makes it better:  No   She is taking medications regularly.        Patient noticed a mole above her left eyebrow recently.  Has been present over the last month.  Gets irritated if she washes it.  If she picks at it it will bleed.  Getting a little bigger.  Would like it removed to rule out concerns.  No personal or family history of skin cancer.      Review of Systems  Constitutional, HEENT, cardiovascular, pulmonary, gi and gu systems are negative, except as otherwise noted.      Objective    /83 (BP Location: Right arm, Patient Position: Sitting, Cuff Size: Adult Regular)   Pulse 90   Temp 97.6  F (36.4  C) (Tympanic)   Resp 18   Ht 1.638 m (5' 4.5\")   Wt 97 kg (213 lb 12.8 oz)   LMP 04/01/2014 (Approximate)   SpO2 97%   BMI 36.13 kg/m    Body mass index is 36.13 kg/m .  Physical Exam  Vitals and nursing note reviewed.   Constitutional:       Appearance: Normal appearance.   HENT:      Head: Normocephalic and atraumatic.   Musculoskeletal:         General: Normal range of motion.      Cervical back: Normal range of motion.   Skin:     General: Skin is " warm and dry.      Findings: No rash.      Comments: Slightly raised, dry, light brown papule appreciated on the left inferior forehead approximately 2 x 4 mm in diameter.   Neurological:      General: No focal deficit present.      Mental Status: She is alert and oriented to person, place, and time.   Psychiatric:         Mood and Affect: Mood normal.         Behavior: Behavior normal.          No results found for any visits on 03/20/25.        Signed Electronically by: Johanne Welch PA-C

## 2025-03-25 ENCOUNTER — OFFICE VISIT (OUTPATIENT)
Dept: SURGERY | Facility: OTHER | Age: 55
End: 2025-03-25
Attending: PHYSICIAN ASSISTANT
Payer: COMMERCIAL

## 2025-03-25 VITALS
BODY MASS INDEX: 35.32 KG/M2 | SYSTOLIC BLOOD PRESSURE: 129 MMHG | HEIGHT: 65 IN | OXYGEN SATURATION: 99 % | RESPIRATION RATE: 16 BRPM | HEART RATE: 105 BPM | DIASTOLIC BLOOD PRESSURE: 79 MMHG | TEMPERATURE: 98.6 F | WEIGHT: 212 LBS

## 2025-03-25 DIAGNOSIS — L98.9 SKIN LESION: ICD-10-CM

## 2025-03-25 ASSESSMENT — PAIN SCALES - GENERAL: PAINLEVEL_OUTOF10: NO PAIN (0)

## 2025-03-25 NOTE — NURSING NOTE
"Chief Complaint   Patient presents with    Derm Problem       Initial /79 (BP Location: Right arm, Patient Position: Sitting, Cuff Size: Adult Large)   Pulse 105   Temp 98.6  F (37  C) (Tympanic)   Resp 16   Ht 1.638 m (5' 4.5\")   Wt 96.2 kg (212 lb)   LMP 04/01/2014 (Approximate)   SpO2 99%   BMI 35.83 kg/m   Estimated body mass index is 35.83 kg/m  as calculated from the following:    Height as of this encounter: 1.638 m (5' 4.5\").    Weight as of this encounter: 96.2 kg (212 lb).  Meds Reconciled: complete      FOOD SECURITY SCREENING QUESTIONS  Hunger Vital Signs:  Within the past 12 months we worried whether our food would run out before we got money to buy more. Never  Within the past 12 months the food we bought just didn't last and we didn't have money to get more. Never  Olga Teran RN 3/25/2025 3:53 PM        Olga Teran RN     "

## 2025-03-25 NOTE — PATIENT INSTRUCTIONS
Instructions:    Your incision was closed with stitches that will dissolve.  A surgical glue was used to help keep the incision closed. Do not apply any Vaseline, triple antibiotics, bacitracin, or any product on the glue as this may soften and create it to be sticky which may cause skin irritation. It is ok to get the incision wet in the shower on the day after your procedure. Pat dry the area. Do not rub. Don't soak in a tub, pool or lake for 7 days.     Pathology can take up to 7-10 days to be completed. Once pathology is reviewed by the provider we will give you a call, letter, or MyChart message with the results. You may see the results prior to the provider reviewing them through your MyChart.       Monitor for any signs of infection:     Redness in the area of the wound, particularly if it spreads or forms a red streak  Swelling or warmth in the affected area  Pain or tenderness at or around the site of the wound  Pus forming around or oozing from the wound  Fever  Delayed wound healing    Please call the General Surgery office and ask for a nurse in unit 4S with problems, questions, or concerns at [phone].    General Surgery Nurses  Olga Castellano LPN

## 2025-03-25 NOTE — PROGRESS NOTES
Procedure Note      Pre/Post Operative Diagnosis:   Left eyebrow skin lesion     Procedure:   Removal of  Left eyebrow skin lesion      Surgeon: Shailesh Golden MD    Local Anesthesia: 1% lidocaine with 0.25% Marcaine with epinephrine    Indication for the procedure:  This is a 54 year old female  patient referred by Zuly Dyer with a Left eyebrow skin lesion.       After explaining the risks to include bleeding, infection, recurrence or need for reexcision, and scarring the patient wished to proceed.    Procedure:   The area was prepped and draped in usual sterile fashion with ChloraPrep.   After, adequate local anesthesia, an 1 cm X 0.4 cm elliptical skin incision was made to encompass the  0.4 cm lesion.  The skin was closed with 4-0 Monocryl and Dermabond.      Plan:  The patient will be called to review the pathology. Patient will follow up if there any problems with the wound including redness or drainage.      Shailesh Golden MD....................  3/25/2025   4:07 PM

## 2025-03-25 NOTE — PROGRESS NOTES
TIMEOUT    Universal Protocol    A. Pre-procedure verification complete   1-relevant information / documentation available, reviewed and properly matched to the patient; 2-consent accurate and complete, 3-equipment and supplies available    B. Site marking complete   Site marked if not in continuous attendance with patient    C. TIME OUT completed   Time Out was conducted just prior to starting procedure to verify the eight required elements: 1-patient identity, 2-consent accurate and complete, 3-position, 4-correct side/site marked (if applicable), 5-procedure, 6-relevant images / results properly labeled and displayed (if applicable), 7-antibiotics / irrigation fluids (if applicable), 8-safety precautions.    Surgical Nurse  ....................  3/25/2025   3:58 PM

## 2025-03-28 ENCOUNTER — HOSPITAL ENCOUNTER (OUTPATIENT)
Dept: MAMMOGRAPHY | Facility: OTHER | Age: 55
Discharge: HOME | End: 2025-03-28
Attending: FAMILY MEDICINE | Admitting: FAMILY MEDICINE
Payer: COMMERCIAL

## 2025-03-28 DIAGNOSIS — Z12.31 VISIT FOR SCREENING MAMMOGRAM: ICD-10-CM

## 2025-03-28 PROCEDURE — 77063 BREAST TOMOSYNTHESIS BI: CPT

## 2025-03-31 LAB
PATH REPORT.COMMENTS IMP SPEC: NORMAL
PATH REPORT.FINAL DX SPEC: NORMAL
PHOTO IMAGE: NORMAL

## 2025-04-16 ASSESSMENT — PATIENT HEALTH QUESTIONNAIRE - PHQ9
10. IF YOU CHECKED OFF ANY PROBLEMS, HOW DIFFICULT HAVE THESE PROBLEMS MADE IT FOR YOU TO DO YOUR WORK, TAKE CARE OF THINGS AT HOME, OR GET ALONG WITH OTHER PEOPLE: VERY DIFFICULT
SUM OF ALL RESPONSES TO PHQ QUESTIONS 1-9: 5
SUM OF ALL RESPONSES TO PHQ QUESTIONS 1-9: 5

## 2025-04-17 ENCOUNTER — OFFICE VISIT (OUTPATIENT)
Dept: INTERNAL MEDICINE | Facility: OTHER | Age: 55
End: 2025-04-17
Payer: COMMERCIAL

## 2025-04-17 VITALS
DIASTOLIC BLOOD PRESSURE: 93 MMHG | BODY MASS INDEX: 36.37 KG/M2 | WEIGHT: 213 LBS | SYSTOLIC BLOOD PRESSURE: 132 MMHG | RESPIRATION RATE: 16 BRPM | HEART RATE: 96 BPM | HEIGHT: 64 IN | OXYGEN SATURATION: 98 % | TEMPERATURE: 97.5 F

## 2025-04-17 DIAGNOSIS — E66.01 CLASS 2 SEVERE OBESITY WITH SERIOUS COMORBIDITY AND BODY MASS INDEX (BMI) OF 36.0 TO 36.9 IN ADULT, UNSPECIFIED OBESITY TYPE (H): ICD-10-CM

## 2025-04-17 DIAGNOSIS — K21.9 GASTROESOPHAGEAL REFLUX DISEASE, UNSPECIFIED WHETHER ESOPHAGITIS PRESENT: Primary | ICD-10-CM

## 2025-04-17 DIAGNOSIS — E78.2 MIXED HYPERLIPIDEMIA: ICD-10-CM

## 2025-04-17 DIAGNOSIS — E66.812 CLASS 2 SEVERE OBESITY WITH SERIOUS COMORBIDITY AND BODY MASS INDEX (BMI) OF 36.0 TO 36.9 IN ADULT, UNSPECIFIED OBESITY TYPE (H): ICD-10-CM

## 2025-04-17 RX ORDER — FAMOTIDINE 20 MG/1
20 TABLET, FILM COATED ORAL 2 TIMES DAILY PRN
Qty: 180 TABLET | Refills: 4 | Status: SHIPPED | OUTPATIENT
Start: 2025-04-17

## 2025-04-17 ASSESSMENT — PAIN SCALES - GENERAL: PAINLEVEL_OUTOF10: NO PAIN (0)

## 2025-04-17 ASSESSMENT — ENCOUNTER SYMPTOMS
HEARTBURN: 1
FATIGUE: 1

## 2025-04-17 NOTE — NURSING NOTE
"Chief Complaint   Patient presents with    Endoscopy results    Weight Loss   Patient presents to the Clinic today to follow up on upper endoscopy results and weight loss.    Initial BP (!) 132/93 (BP Location: Right arm, Patient Position: Sitting, Cuff Size: Adult Regular)   Pulse 96   Temp 97.5  F (36.4  C) (Tympanic)   Resp 16   Ht 1.626 m (5' 4\")   Wt 96.6 kg (213 lb)   LMP 04/01/2014 (Approximate)   SpO2 98%   BMI 36.56 kg/m   Estimated body mass index is 36.56 kg/m  as calculated from the following:    Height as of this encounter: 1.626 m (5' 4\").    Weight as of this encounter: 96.6 kg (213 lb).  Medication Review: complete    The next two questions are to help us understand your food security.  If you are feeling you need any assistance in this area, we have resources available to support you today.          4/17/2025   SDOH- Food Insecurity   Within the past 12 months, did you worry that your food would run out before you got money to buy more? N   Within the past 12 months, did the food you bought just not last and you didn t have money to get more? N         Health Care Directive:  Patient does not have a Health Care Directive: Discussed advance care planning with patient; however, patient declined at this time.    Soheila Coronado      "

## 2025-04-17 NOTE — PATIENT INSTRUCTIONS
Vitamin D and start B 12 Supplement- I recommend  600 to 800 units (15 to 20 micrograms) of vitamin D2 or D3 daily to maintain levels in the target range.      [facility] Approved Food [facility]-- you can scan foods that are not processed and ok to eat.

## 2025-04-17 NOTE — PROGRESS NOTES
Assessment & Plan     ICD-10-CM    1. Gastroesophageal reflux disease, unspecified whether esophagitis present  K21.9 famotidine (PEPCID) 20 MG tablet      2. Class 2 severe obesity with serious comorbidity and body mass index (BMI) of 36.0 to 36.9 in adult, unspecified obesity type (H)  E66.812 tirzepatide (MOUNJARO) 2.5 MG/0.5ML SOAJ auto-injector pen    Z68.36 tirzepatide (MOUNJARO) 5 MG/0.5ML SOAJ auto-injector pen    E66.01 Tirzepatide (MOUNJARO) 7.5 MG/0.5ML SOAJ auto-injector pen     tirzepatide (MOUNJARO) 10 MG/0.5ML SOAJ auto-injector pen     tirzepatide (MOUNJARO) 12.5 MG/0.5ML SOAJ auto-injector pen      3. Mixed hyperlipidemia  E78.2 tirzepatide (MOUNJARO) 2.5 MG/0.5ML SOAJ auto-injector pen     tirzepatide (MOUNJARO) 5 MG/0.5ML SOAJ auto-injector pen     Tirzepatide (MOUNJARO) 7.5 MG/0.5ML SOAJ auto-injector pen     tirzepatide (MOUNJARO) 10 MG/0.5ML SOAJ auto-injector pen     tirzepatide (MOUNJARO) 12.5 MG/0.5ML SOAJ auto-injector pen         GERD: Reviewed upper endoscopy report which was overall reassuring, we will transition her off of omeprazole and onto Pepcid.  Discussed instruction of slow titration off of PPI and onto H2 blocker.  She will continue to monitor symptoms, follow-up if needed.    OBESITY - Ongoing.  (See Encounter Diagnosis list above for obesity class / severity).  - Encourage continued maintenance / improvement in diet and exercise.   - Consider Nutrition / Dietician appointment.  - Weight loss would improve Hypertension, Cholesterol- recommend trialing GLP-1 agonist- mounjaro sent to pharmacy- discussed potential side effects of medication.      The longitudinal plan of care for the diagnosis(es)/condition(s) as documented were addressed during this visit. Due to the added complexity in care, I will continue to support Sharon in the subsequent management and with ongoing continuity of care.                 No follow-ups on file.      ANNIKA Muñiz [facility]    Review of Systems   Constitutional:  Positive for fatigue (worse over last 6-12 months).   Gastrointestinal:  Positive for heartburn (controlled).   All other systems reviewed and are negative.        Kiel Herrera is a 54 year old, presenting for the following health issues:    Patient presents to clinic for follow-up on acid reflux and obesity.  She did have an upper endoscopy as she was having significant reflux, also on anticoagulant, has been taking 20 mg of omeprazole.  EGD did not show any abnormalities.  She is interested in transitioning off of a PPI and onto H2 blocker.  She continues to suffer from ongoing fatigue, states that she has low on vision, feeling upset about continuing to gain weight, feels little motivation to want to exercise.  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      Endoscopy results and Weight Loss    History of Present Illness       Reason for visit:  Follow up on upper endoscopy and weight management    She eats 2-3 servings of fruits and vegetables daily.She consumes 0 sweetened beverage(s) daily.She exercises with enough effort to increase her heart rate 10 to 19 minutes per day.  She exercises with enough effort to increase her heart rate 3 or less days per week.   She is taking medications regularly.                  Objective    BP (!) 132/93 (BP Location: Right arm, Patient Position: Sitting, Cuff Size: Adult Regular)   Pulse 96   Temp 97.5  F (36.4  C) (Tympanic)   Resp 16   Ht 1.626 m (5' 4\")   Wt 96.6 kg (213 lb)   LMP 04/01/2014 (Approximate)   SpO2 98%   BMI 36.56 kg/m    Body mass index is 36.56 kg/m .  Physical Exam  Vitals reviewed.   Constitutional:       General: She is not in acute distress.     Appearance: She is well-developed. She is obese.   HENT:      Head: Normocephalic and atraumatic.      Nose: Nose normal.   Eyes:      General: No scleral icterus.     " Conjunctiva/sclera: Conjunctivae normal.      Pupils: Pupils are equal, round, and reactive to light.   Neck:      Thyroid: No thyromegaly.   Musculoskeletal:         General: Normal range of motion.      Cervical back: Normal range of motion and neck supple.   Skin:     General: Skin is warm and dry.      Findings: No rash.   Neurological:      Mental Status: She is alert and oriented to person, place, and time.      Coordination: Coordination normal.   Psychiatric:         Behavior: Behavior normal.         Thought Content: Thought content normal.         Judgment: Judgment normal.                    Signed Electronically by: ANNIKA Muñiz CNP

## 2025-04-21 ENCOUNTER — TELEPHONE (OUTPATIENT)
Dept: FAMILY MEDICINE | Facility: OTHER | Age: 55
End: 2025-04-21
Payer: COMMERCIAL

## 2025-04-21 NOTE — TELEPHONE ENCOUNTER
Patient stated that she needs a prior auth for the tirzepatide to be covered by her insurance. Please call patient with any questions.    Bianca Decker on 4/21/2025 at 1:50 PM

## 2025-04-23 ENCOUNTER — MYC MEDICAL ADVICE (OUTPATIENT)
Dept: INTERNAL MEDICINE | Facility: OTHER | Age: 55
End: 2025-04-23
Payer: COMMERCIAL

## 2025-04-23 DIAGNOSIS — E78.2 MIXED HYPERLIPIDEMIA: ICD-10-CM

## 2025-04-23 DIAGNOSIS — E66.812 CLASS 2 OBESITY WITH BODY MASS INDEX (BMI) OF 35.0 TO 35.9 IN ADULT, UNSPECIFIED OBESITY TYPE, UNSPECIFIED WHETHER SERIOUS COMORBIDITY PRESENT: Primary | ICD-10-CM

## 2025-04-23 NOTE — TELEPHONE ENCOUNTER
Mounjaro is going to be denied. Requesting Wegovy or Zepbound.     Trevor'd up order for Zepbound.   Please remove Mounjaro orders from med list.     Routing to provider to review and respond.  Sarah Crowell RN on 4/23/2025 at 2:48 PM

## 2025-05-01 ENCOUNTER — MYC MEDICAL ADVICE (OUTPATIENT)
Dept: INTERNAL MEDICINE | Facility: OTHER | Age: 55
End: 2025-05-01
Payer: COMMERCIAL

## 2025-05-12 ENCOUNTER — MYC MEDICAL ADVICE (OUTPATIENT)
Dept: INTERNAL MEDICINE | Facility: OTHER | Age: 55
End: 2025-05-12
Payer: COMMERCIAL

## 2025-05-12 NOTE — LETTER
5/12/2025      Johanne Fish  [address] [facility] [address]  Member # [id number]      To Whom It May Concern:    I am Johanne Fish's Primary Care Provider and she was recently denied coverage of Zepbound.  The reasoning for the denial was listed as:    Documentation has not shown that the weight loss medications will be used in combination with a reduced calorie diet and increased physical activity.    Unfortunately, you were not given the correct information. Johanne has been following a reduced calorie diet and exercise that were discussed with her by her primary care provider. She has also been following healthy diet guidelines regarding portion control. She has tried weight watchers and Keto from January to June of 2024. Johanne met with our dietitian in June of 2024 and has been following the  Dish up a healthy Meal  program as well as Mediterranean lifestyle recommendations/recipes.     Please re-consider coverage of Zepbound for Johanne so that she can continue to safely meet her healthcare goals while losing weight.    Do not hesitate to reach out to my office with questions or updates at [phone].      Sincerely,        ANNIKA Muñiz CNP    Electronically signed

## 2025-05-13 NOTE — TELEPHONE ENCOUNTER
Needs Appeal letter for Zepbound.     Pended letter.     Will need to fax to:  [facility]  Attn: Appeal   [phone]     Please review, print, and sign.     Route back to [employer] and we will .     Sarah Crowell RN on 5/13/2025 at 3:01 PM

## 2025-05-13 NOTE — TELEPHONE ENCOUNTER
Faxed letter of appeal for Zepbound to:     [facility]   Attn: Appeals   Fax [phone]    Patient update on MyChart.    Regina Gar RN on 5/13/2025 at 4:36 PM

## 2025-06-03 ENCOUNTER — MYC MEDICAL ADVICE (OUTPATIENT)
Dept: INTERNAL MEDICINE | Facility: OTHER | Age: 55
End: 2025-06-03
Payer: COMMERCIAL

## 2025-06-03 VITALS — WEIGHT: 200.4 LBS | BODY MASS INDEX: 34.4 KG/M2

## 2025-06-03 DIAGNOSIS — E66.812 CLASS 2 OBESITY WITH BODY MASS INDEX (BMI) OF 35.0 TO 35.9 IN ADULT, UNSPECIFIED OBESITY TYPE, UNSPECIFIED WHETHER SERIOUS COMORBIDITY PRESENT: ICD-10-CM

## 2025-06-03 DIAGNOSIS — E78.2 MIXED HYPERLIPIDEMIA: ICD-10-CM

## 2025-06-03 NOTE — TELEPHONE ENCOUNTER
Pt requesting refill of Zepbound.  No bad s/e only dry mouth and mild nausea. Current weight 200.4 lbs.       Currently prescribed 2.5 mg dose.      Trevor'd up order for 5 mg dose.   Updated wt in chart.      Refill request sent to provider for review and approval.  Sarah Crowell RN on 6/3/2025 at 4:07 PM

## 2025-06-24 ENCOUNTER — MYC MEDICAL ADVICE (OUTPATIENT)
Dept: INTERNAL MEDICINE | Facility: OTHER | Age: 55
End: 2025-06-24
Payer: COMMERCIAL

## 2025-06-24 DIAGNOSIS — E66.812 CLASS 2 OBESITY WITH BODY MASS INDEX (BMI) OF 35.0 TO 35.9 IN ADULT, UNSPECIFIED OBESITY TYPE, UNSPECIFIED WHETHER SERIOUS COMORBIDITY PRESENT: ICD-10-CM

## 2025-06-24 DIAGNOSIS — E78.2 MIXED HYPERLIPIDEMIA: ICD-10-CM

## 2025-06-25 NOTE — TELEPHONE ENCOUNTER
Pt needing Zepbound 7.5 mg dose sent to pharmacy. No s/e. Down to 191.4    Currently on 5 mg dose.     Trevor'd up order.     Routing to provider to review and respond.  Sarah Crowell RN on 6/25/2025 at 8:04 AM

## 2025-08-30 ENCOUNTER — MYC REFILL (OUTPATIENT)
Dept: FAMILY MEDICINE | Facility: OTHER | Age: 55
End: 2025-08-30
Payer: COMMERCIAL

## 2025-09-01 ENCOUNTER — MYC MEDICAL ADVICE (OUTPATIENT)
Dept: INTERNAL MEDICINE | Facility: OTHER | Age: 55
End: 2025-09-01
Payer: COMMERCIAL

## 2025-09-01 DIAGNOSIS — E78.2 MIXED HYPERLIPIDEMIA: ICD-10-CM

## 2025-09-01 DIAGNOSIS — F41.1 ANXIETY, GENERALIZED: Primary | ICD-10-CM

## 2025-09-01 DIAGNOSIS — E66.812 CLASS 2 OBESITY WITH BODY MASS INDEX (BMI) OF 35.0 TO 35.9 IN ADULT, UNSPECIFIED OBESITY TYPE, UNSPECIFIED WHETHER SERIOUS COMORBIDITY PRESENT: ICD-10-CM

## 2025-09-02 VITALS — BODY MASS INDEX: 29.01 KG/M2 | WEIGHT: 169 LBS

## 2025-09-02 RX ORDER — BUSPIRONE HYDROCHLORIDE 10 MG/1
10 TABLET ORAL
Qty: 180 TABLET | Refills: 3 | Status: SHIPPED | OUTPATIENT
Start: 2025-09-02

## 2025-09-02 RX ORDER — ESCITALOPRAM OXALATE 5 MG/1
5 TABLET ORAL
Qty: 90 TABLET | Refills: 3 | Status: SHIPPED | OUTPATIENT
Start: 2025-09-02

## 2025-09-03 RX ORDER — BUSPIRONE HYDROCHLORIDE 10 MG/1
10 TABLET ORAL
OUTPATIENT
Start: 2025-09-03

## 2025-09-03 RX ORDER — ESCITALOPRAM OXALATE 5 MG/1
5 TABLET ORAL
OUTPATIENT
Start: 2025-09-03

## (undated) DEVICE — ENDO BRUSH CHANNEL MASTER CLEANING 2-4.2MM BW-412T

## (undated) DEVICE — ESU ENDO FORCEP BX HOT FD-210U

## (undated) DEVICE — SUCTION MANIFOLD NEPTUNE 2 SYS 4 PORT 0702-020-000

## (undated) DEVICE — ENDO FORCEP ENDOJAW BIOPSY 2.8MMX230CM FB-220U

## (undated) DEVICE — TUBING SUCTION 10'X3/16" N510

## (undated) DEVICE — ENDO KIT COMPLIANCE DYKENDOCMPLY

## (undated) DEVICE — ESU GROUND PAD ADULT W/CORD E7507

## (undated) DEVICE — SYR 50ML LL W/O NDL 309653

## (undated) DEVICE — SOL WATER 1500ML

## (undated) DEVICE — ENDO BITE BLOCK 60 MAXI LF 00712804

## (undated) DEVICE — Device

## (undated) RX ORDER — PROPOFOL 10 MG/ML
INJECTION, EMULSION INTRAVENOUS
Status: DISPENSED
Start: 2025-02-27

## (undated) RX ORDER — DEXAMETHASONE SODIUM PHOSPHATE 4 MG/ML
INJECTION, SOLUTION INTRA-ARTICULAR; INTRALESIONAL; INTRAMUSCULAR; INTRAVENOUS; SOFT TISSUE
Status: DISPENSED
Start: 2025-03-02

## (undated) RX ORDER — PROPOFOL 10 MG/ML
INJECTION, EMULSION INTRAVENOUS
Status: DISPENSED
Start: 2020-09-28

## (undated) RX ORDER — SIMETHICONE 40MG/0.6ML
SUSPENSION, DROPS(FINAL DOSAGE FORM)(ML) ORAL
Status: DISPENSED
Start: 2020-09-28